# Patient Record
Sex: FEMALE | Race: OTHER | HISPANIC OR LATINO | URBAN - METROPOLITAN AREA
[De-identification: names, ages, dates, MRNs, and addresses within clinical notes are randomized per-mention and may not be internally consistent; named-entity substitution may affect disease eponyms.]

---

## 2018-04-17 ENCOUNTER — OUTPATIENT (OUTPATIENT)
Dept: OUTPATIENT SERVICES | Facility: HOSPITAL | Age: 83
LOS: 1 days | End: 2018-04-17
Payer: MEDICARE

## 2018-04-17 DIAGNOSIS — J44.9 CHRONIC OBSTRUCTIVE PULMONARY DISEASE, UNSPECIFIED: ICD-10-CM

## 2018-04-17 PROCEDURE — 71046 X-RAY EXAM CHEST 2 VIEWS: CPT

## 2018-04-17 PROCEDURE — 71046 X-RAY EXAM CHEST 2 VIEWS: CPT | Mod: 26

## 2018-04-23 ENCOUNTER — INPATIENT (INPATIENT)
Facility: HOSPITAL | Age: 83
LOS: 1 days | Discharge: HOME | DRG: 176 | End: 2018-04-25
Attending: HOSPITALIST | Admitting: HOSPITALIST
Payer: COMMERCIAL

## 2018-04-23 VITALS
TEMPERATURE: 99 F | RESPIRATION RATE: 18 BRPM | HEART RATE: 81 BPM | SYSTOLIC BLOOD PRESSURE: 122 MMHG | DIASTOLIC BLOOD PRESSURE: 82 MMHG | OXYGEN SATURATION: 97 % | WEIGHT: 134.92 LBS | HEIGHT: 62 IN

## 2018-04-23 DIAGNOSIS — Z90.49 ACQUIRED ABSENCE OF OTHER SPECIFIED PARTS OF DIGESTIVE TRACT: Chronic | ICD-10-CM

## 2018-04-23 DIAGNOSIS — I26.99 OTHER PULMONARY EMBOLISM WITHOUT ACUTE COR PULMONALE: ICD-10-CM

## 2018-04-23 LAB
ALBUMIN SERPL ELPH-MCNC: 3.9 G/DL — SIGNIFICANT CHANGE UP (ref 3.3–5)
ALP SERPL-CCNC: 93 U/L — SIGNIFICANT CHANGE UP (ref 40–120)
ALT FLD-CCNC: 15 U/L — SIGNIFICANT CHANGE UP (ref 10–45)
ANION GAP SERPL CALC-SCNC: 12 MMOL/L — SIGNIFICANT CHANGE UP (ref 5–17)
APTT BLD: 109.1 SEC — HIGH (ref 27.5–37.4)
AST SERPL-CCNC: 13 U/L — SIGNIFICANT CHANGE UP (ref 10–40)
BASOPHILS # BLD AUTO: 0 K/UL — SIGNIFICANT CHANGE UP (ref 0–0.2)
BASOPHILS NFR BLD AUTO: 0.5 % — SIGNIFICANT CHANGE UP (ref 0–2)
BILIRUB SERPL-MCNC: 0.3 MG/DL — SIGNIFICANT CHANGE UP (ref 0.2–1.2)
BLD GP AB SCN SERPL QL: NEGATIVE — SIGNIFICANT CHANGE UP
BUN SERPL-MCNC: 21 MG/DL — SIGNIFICANT CHANGE UP (ref 7–23)
CALCIUM SERPL-MCNC: 9.3 MG/DL — SIGNIFICANT CHANGE UP (ref 8.4–10.5)
CHLORIDE SERPL-SCNC: 104 MMOL/L — SIGNIFICANT CHANGE UP (ref 96–108)
CK MB CFR SERPL CALC: 3.8 NG/ML — SIGNIFICANT CHANGE UP (ref 0–3.8)
CK SERPL-CCNC: 94 U/L — SIGNIFICANT CHANGE UP (ref 25–170)
CO2 SERPL-SCNC: 26 MMOL/L — SIGNIFICANT CHANGE UP (ref 22–31)
CREAT SERPL-MCNC: 0.79 MG/DL — SIGNIFICANT CHANGE UP (ref 0.5–1.3)
EOSINOPHIL # BLD AUTO: 0.1 K/UL — SIGNIFICANT CHANGE UP (ref 0–0.5)
EOSINOPHIL NFR BLD AUTO: 1.8 % — SIGNIFICANT CHANGE UP (ref 0–6)
GAS PNL BLDV: SIGNIFICANT CHANGE UP
GLUCOSE SERPL-MCNC: 142 MG/DL — HIGH (ref 70–99)
HCT VFR BLD CALC: 34.4 % — LOW (ref 34.5–45)
HCT VFR BLD CALC: 38.9 % — SIGNIFICANT CHANGE UP (ref 34.5–45)
HGB BLD-MCNC: 11.5 G/DL — SIGNIFICANT CHANGE UP (ref 11.5–15.5)
HGB BLD-MCNC: 13.2 G/DL — SIGNIFICANT CHANGE UP (ref 11.5–15.5)
INR BLD: 1.06 RATIO — SIGNIFICANT CHANGE UP (ref 0.88–1.16)
LYMPHOCYTES # BLD AUTO: 1.5 K/UL — SIGNIFICANT CHANGE UP (ref 1–3.3)
LYMPHOCYTES # BLD AUTO: 20.5 % — SIGNIFICANT CHANGE UP (ref 13–44)
MCHC RBC-ENTMCNC: 29.8 PG — SIGNIFICANT CHANGE UP (ref 27–34)
MCHC RBC-ENTMCNC: 30.2 PG — SIGNIFICANT CHANGE UP (ref 27–34)
MCHC RBC-ENTMCNC: 33.5 GM/DL — SIGNIFICANT CHANGE UP (ref 32–36)
MCHC RBC-ENTMCNC: 33.9 GM/DL — SIGNIFICANT CHANGE UP (ref 32–36)
MCV RBC AUTO: 89 FL — SIGNIFICANT CHANGE UP (ref 80–100)
MCV RBC AUTO: 89.2 FL — SIGNIFICANT CHANGE UP (ref 80–100)
MONOCYTES # BLD AUTO: 0.6 K/UL — SIGNIFICANT CHANGE UP (ref 0–0.9)
MONOCYTES NFR BLD AUTO: 7.5 % — SIGNIFICANT CHANGE UP (ref 2–14)
NEUTROPHILS # BLD AUTO: 5.2 K/UL — SIGNIFICANT CHANGE UP (ref 1.8–7.4)
NEUTROPHILS NFR BLD AUTO: 69.5 % — SIGNIFICANT CHANGE UP (ref 43–77)
PLATELET # BLD AUTO: 176 K/UL — SIGNIFICANT CHANGE UP (ref 150–400)
PLATELET # BLD AUTO: 200 K/UL — SIGNIFICANT CHANGE UP (ref 150–400)
POTASSIUM SERPL-MCNC: 4.2 MMOL/L — SIGNIFICANT CHANGE UP (ref 3.5–5.3)
POTASSIUM SERPL-SCNC: 4.2 MMOL/L — SIGNIFICANT CHANGE UP (ref 3.5–5.3)
PROT SERPL-MCNC: 7.1 G/DL — SIGNIFICANT CHANGE UP (ref 6–8.3)
PROTHROM AB SERPL-ACNC: 11.6 SEC — SIGNIFICANT CHANGE UP (ref 9.8–12.7)
RBC # BLD: 3.87 M/UL — SIGNIFICANT CHANGE UP (ref 3.8–5.2)
RBC # BLD: 4.37 M/UL — SIGNIFICANT CHANGE UP (ref 3.8–5.2)
RBC # FLD: 12.1 % — SIGNIFICANT CHANGE UP (ref 10.3–14.5)
RBC # FLD: 12.3 % — SIGNIFICANT CHANGE UP (ref 10.3–14.5)
RH IG SCN BLD-IMP: POSITIVE — SIGNIFICANT CHANGE UP
SODIUM SERPL-SCNC: 142 MMOL/L — SIGNIFICANT CHANGE UP (ref 135–145)
TROPONIN T SERPL-MCNC: <0.01 NG/ML — SIGNIFICANT CHANGE UP (ref 0–0.06)
WBC # BLD: 7.2 K/UL — SIGNIFICANT CHANGE UP (ref 3.8–10.5)
WBC # BLD: 7.5 K/UL — SIGNIFICANT CHANGE UP (ref 3.8–10.5)
WBC # FLD AUTO: 7.2 K/UL — SIGNIFICANT CHANGE UP (ref 3.8–10.5)
WBC # FLD AUTO: 7.5 K/UL — SIGNIFICANT CHANGE UP (ref 3.8–10.5)

## 2018-04-23 PROCEDURE — 99223 1ST HOSP IP/OBS HIGH 75: CPT | Mod: AI

## 2018-04-23 PROCEDURE — 93010 ELECTROCARDIOGRAM REPORT: CPT

## 2018-04-23 PROCEDURE — 93308 TTE F-UP OR LMTD: CPT | Mod: 26

## 2018-04-23 PROCEDURE — 99285 EMERGENCY DEPT VISIT HI MDM: CPT | Mod: 25

## 2018-04-23 RX ORDER — HEPARIN SODIUM 5000 [USP'U]/ML
5000 INJECTION INTRAVENOUS; SUBCUTANEOUS ONCE
Qty: 0 | Refills: 0 | Status: COMPLETED | OUTPATIENT
Start: 2018-04-23 | End: 2018-04-23

## 2018-04-23 RX ORDER — HEPARIN SODIUM 5000 [USP'U]/ML
2500 INJECTION INTRAVENOUS; SUBCUTANEOUS EVERY 6 HOURS
Qty: 0 | Refills: 0 | Status: DISCONTINUED | OUTPATIENT
Start: 2018-04-23 | End: 2018-04-25

## 2018-04-23 RX ORDER — HEPARIN SODIUM 5000 [USP'U]/ML
5000 INJECTION INTRAVENOUS; SUBCUTANEOUS EVERY 6 HOURS
Qty: 0 | Refills: 0 | Status: DISCONTINUED | OUTPATIENT
Start: 2018-04-23 | End: 2018-04-25

## 2018-04-23 RX ORDER — SODIUM CHLORIDE 9 MG/ML
1000 INJECTION INTRAMUSCULAR; INTRAVENOUS; SUBCUTANEOUS ONCE
Qty: 0 | Refills: 0 | Status: COMPLETED | OUTPATIENT
Start: 2018-04-23 | End: 2018-04-23

## 2018-04-23 RX ORDER — HEPARIN SODIUM 5000 [USP'U]/ML
INJECTION INTRAVENOUS; SUBCUTANEOUS
Qty: 25000 | Refills: 0 | Status: DISCONTINUED | OUTPATIENT
Start: 2018-04-23 | End: 2018-04-24

## 2018-04-23 RX ADMIN — SODIUM CHLORIDE 1000 MILLILITER(S): 9 INJECTION INTRAMUSCULAR; INTRAVENOUS; SUBCUTANEOUS at 16:08

## 2018-04-23 RX ADMIN — HEPARIN SODIUM 1100 UNIT(S)/HR: 5000 INJECTION INTRAVENOUS; SUBCUTANEOUS at 17:33

## 2018-04-23 RX ADMIN — HEPARIN SODIUM 5000 UNIT(S): 5000 INJECTION INTRAVENOUS; SUBCUTANEOUS at 17:33

## 2018-04-23 NOTE — H&P ADULT - PROBLEM SELECTOR PLAN 1
bedside ED TTE w/o pericardial effusion or R heart strain, currently vitals are stable  - Heparin gtt started in ED, will titrate and monitor for signs of bleeding  - will check TTE and LE Doppler  - denied provoking factors, last mammo 2 yrs ago - reported neg, last PAP >20 yrs ago and reported negative, colonoscopy - "didn't remember"  - Will need Heme eval for hypercoag w/u prior d/c AC

## 2018-04-23 NOTE — ED ADULT TRIAGE NOTE - CHIEF COMPLAINT QUOTE
back pain for 6 months, s/p MRI Saturday. As per daughter she was told pt has "blood clot on her chest"

## 2018-04-23 NOTE — H&P ADULT - FAMILY HISTORY
No pertinent family history in first degree relatives Mother  Still living? No  Family history of diabetes mellitus, Age at diagnosis: Age Unknown

## 2018-04-23 NOTE — H&P ADULT - PROBLEM SELECTOR PLAN 4
confirmed wit patient and daughter Greta at bedside and "meds from other sources" - will treat with Crestor

## 2018-04-23 NOTE — ED ADULT NURSE REASSESSMENT NOTE - NS ED NURSE REASSESS COMMENT FT1
Received patient from previous shift RN, unable to introduce self to patient at this time, patient in ultrasound, will continue to monitor upon return.

## 2018-04-23 NOTE — H&P ADULT - NSHPLABSRESULTS_GEN_ALL_CORE
Labs personally reviewed  CTA report personally reviewed  EKG tracing personally reviewed Labs personally reviewed  CTA report personally reviewed  EKG tracing personally reviewed - NSR @ 62

## 2018-04-23 NOTE — H&P ADULT - HISTORY OF PRESENT ILLNESS
82 years old female with history of GERD and HTN who was sent from her Doctor's office for urgent evaluation after an MRI of the spine done on [place] showed "clots in the chest." 83 y/o F Hx HTN, HLD, recent Flu in Feb 2018 with reactive airway dz presented to PMD with 1-2 months of R sided upper back pain after moving heavy furniture, sent by PMD for CT spine (4/20/18), showed "clots in the chest", sent to ED for further evaluation and treatment.  Patient has no complaint currently.  Denied CP, SOB/DARDEN, back pain, syncope, LE swelling or pain, recent travel or surgery, immobility or prior/FHx of blood clot.

## 2018-04-23 NOTE — H&P ADULT - ASSESSMENT
82 F hx HTN, GERD 82 F hx HTN, HLD, recent R back pain evaluated by CT spine found to have b/l PE admitted with acute b/l PE.

## 2018-04-23 NOTE — CONSULT NOTE ADULT - SUBJECTIVE AND OBJECTIVE BOX
General Surgery Consult  Consulting surgical team: [facility] [id number]  Consulting attending: Dr. Mcgraw    HPI: 82F with PMHx GERD, HTN sent in by her PCP Dr. Valera after outpatient study showed PE. CT scan shows pneumobilia. Patient denies abdominal pain, nausea/emesis. Patient is a poor historian. She denies any ERCP/sphincterotomy/stent previously or previous lap luis. However, she also denies any surgical history but has two abdominal incisions. Daughter at bedside also is not familiar with patients full medical/surgical history.     PAST MEDICAL HISTORY:  GERD (gastroesophageal reflux disease)  HTN (hypertension)    PAST SURGICAL HISTORY:  open appendectomy  lower midline laparotomy (patient does not know which surgery she had done)    MEDICATIONS: rosuvastatin, HCTZ, olmesartan, montelukast     ALLERGIES:  No Known Drug Allergies  Seafood (Hives)    VITALS & I/Os:  Vital Signs Last 24 Hrs  T(C): 37.1 (23 Apr 2018 13:46), Max: 37.1 (23 Apr 2018 13:46)  T(F): 98.7 (23 Apr 2018 13:46), Max: 98.7 (23 Apr 2018 13:46)  HR: 68 (23 Apr 2018 17:40) (68 - 81)  BP: 146/58 (23 Apr 2018 17:40) (122/82 - 147/74)  BP(mean): --  RR: 18 (23 Apr 2018 17:40) (16 - 18)  SpO2: 97% (23 Apr 2018 17:40) (95% - 97%)    PHYSICAL EXAM:  General: No acute distress  Respiratory: Nonlabored  Cardiovascular: Regular rate and rhythm  Abdominal: Soft, nondistended, nontender. No rebound or guarding. No organomegaly, no palpable mass.  Extremities: Warm    LABS:                        13.2   7.5   )-----------( 200      ( 23 Apr 2018 14:54 )             38.9     04-23    142  |  104  |  21  ----------------------------<  142<H>  4.2   |  26  |  0.79    Ca    9.3      23 Apr 2018 14:54    TPro  7.1  /  Alb  3.9  /  TBili  0.3  /  DBili  x   /  AST  13  /  ALT  15  /  AlkPhos  93  04-23    Lactate:  04-23 @ 14:54  1.5    PT/INR - ( 23 Apr 2018 14:54 )   PT: 11.6 sec;   INR: 1.06 ratio         PTT - ( 23 Apr 2018 14:54 )  PTT:27.2 sec    CARDIAC MARKERS ( 23 Apr 2018 14:54 )  x     / <0.01 ng/mL / 94 U/L / x     / 3.8 ng/mL    IMAGING:  US Abdomen Upper Quadrant Right (04.23.18 @ 19:27)   IMPRESSION:   Fundal gallbladder wall calcification and adenomyomatosis of the   gallbladder. No gallstones or secondary signs of acute cholecystitis.    Pneumobilia in the bilateral hepatic lobes with dilated common bile duct   measuring 9 mm. Correlation with history of prior ERCP or sphincterotomy   is recommended.      CT Angio Chest w/ IV Cont (04.23.18 @ 17:09)   IMPRESSION:   Bilateral lobar and segmental pulmonary emboli as above.  Pneumobilia in the visualized abdomen.  Bilateral thyroid nodules for which a nonemergent thyroid ultrasound may   be obtained for further evaluation. General Surgery Consult  Consulting surgical team: [facility] [id number]  Consulting attending: Dr. Mcgraw    HPI: 82F with PMHx GERD, HTN sent in by her PCP Dr. Valera after outpatient study showed PE. CT scan shows pneumobilia. Patient denies abdominal pain, nausea/emesis, recent weight loss, changes or problems with bowel movements. Patient is a poor historian. She denies any ERCP/sphincterotomy/stent previously or previous lap luis. However, she also denies any surgical history but has two abdominal incisions. Daughter at bedside also is not familiar with patients full medical/surgical history. Pt presented with right, upper back pain which began after lifting furniture 1-2 months ago, no shortness of breath. She was sent by per PMD to have an outpatient CT, which demonstrated bilateral segmental PEs and incidental pneumobilia.    PAST MEDICAL HISTORY:  GERD (gastroesophageal reflux disease)  HTN (hypertension)  Asthma    PAST SURGICAL HISTORY:  open appendectomy  lower midline laparotomy (patient does not know which surgery she had done)    MEDICATIONS: rosuvastatin, HCTZ, olmesartan, montelukast     ALLERGIES:  No Known Drug Allergies  Seafood (Hives)    VITALS & I/Os:  Vital Signs Last 24 Hrs  T(C): 37.1 (23 Apr 2018 13:46), Max: 37.1 (23 Apr 2018 13:46)  T(F): 98.7 (23 Apr 2018 13:46), Max: 98.7 (23 Apr 2018 13:46)  HR: 68 (23 Apr 2018 17:40) (68 - 81)  BP: 146/58 (23 Apr 2018 17:40) (122/82 - 147/74)  BP(mean): --  RR: 18 (23 Apr 2018 17:40) (16 - 18)  SpO2: 97% (23 Apr 2018 17:40) (95% - 97%)    PHYSICAL EXAM:  General: No acute distress  Eyes: no scleral icterus  Neuro: A&Ox3, moves all extremities  Respiratory: Nonlabored, CTA B/L  Cardiovascular: Regular rate and rhythm  Abdominal: Soft, nondistended, nontender. No rebound or guarding. No organomegaly, no palpable mass.  Extremities: Warm  Psych: normal affect  Skin: no rashes    LABS:                        13.2   7.5   )-----------( 200      ( 23 Apr 2018 14:54 )             38.9     04-23    142  |  104  |  21  ----------------------------<  142<H>  4.2   |  26  |  0.79    Ca    9.3      23 Apr 2018 14:54    TPro  7.1  /  Alb  3.9  /  TBili  0.3  /  DBili  x   /  AST  13  /  ALT  15  /  AlkPhos  93  04-23    Lactate:  04-23 @ 14:54  1.5    PT/INR - ( 23 Apr 2018 14:54 )   PT: 11.6 sec;   INR: 1.06 ratio         PTT - ( 23 Apr 2018 14:54 )  PTT:27.2 sec    CARDIAC MARKERS ( 23 Apr 2018 14:54 )  x     / <0.01 ng/mL / 94 U/L / x     / 3.8 ng/mL    IMAGING:  US Abdomen Upper Quadrant Right (04.23.18 @ 19:27)   IMPRESSION:   Fundal gallbladder wall calcification and adenomyomatosis of the   gallbladder. No gallstones or secondary signs of acute cholecystitis.    Pneumobilia in the bilateral hepatic lobes with dilated common bile duct   measuring 9 mm. Correlation with history of prior ERCP or sphincterotomy   is recommended.      CT Angio Chest w/ IV Cont (04.23.18 @ 17:09)   IMPRESSION:   Bilateral lobar and segmental pulmonary emboli as above.  Pneumobilia in the visualized abdomen.  Bilateral thyroid nodules for which a nonemergent thyroid ultrasound may   be obtained for further evaluation.

## 2018-04-23 NOTE — H&P ADULT - PROBLEM SELECTOR PLAN 3
- will treat with Crestor non compliant with meds, last time she took them was 2 months ago  - will restart ARB with holding parameters, will hold diuretics   - will monitor BP

## 2018-04-23 NOTE — ED PROVIDER NOTE - SHIFT CHANGE DETAILS
Attending MD Barbosa: Upper back, no SOB, no CP.  PE on non con CT.  No blood in stools.  No head trauma.  Pending CTA Chest, started on anticoag, RV looks ok on US, LLE no clots obvious.  Will need admission.  No weight loss, no malignancy risk factors.

## 2018-04-23 NOTE — CONSULT NOTE ADULT - ASSESSMENT
82F with asymptomatic pneumobilia found incidentally on CT  -no acute surgical intervention at this time  -plan per primary for treatment of pulmonary embolism  -Patient should follow up with Dr. Omar Garcia as an outpatient re: fundal gallbladder wall calcifications  -d/w attending    [id number]

## 2018-04-23 NOTE — H&P ADULT - PROBLEM SELECTOR PLAN 5
on full AC with Heparin gtt confirmed wit patient and daughter Greta at bedside and "meds from other sources"

## 2018-04-23 NOTE — ED PROVIDER NOTE - MEDICAL DECISION MAKING DETAILS
Stable for admission Attending Rodriguez: 81 y/o female sent in after ct noncontrast concerning for possible pulmonary embolism. per family pt has been complaining of upper back pain. pt denies any sob. no headaches or recent head trauma. no black or bloody stools. pt hemodynamically stable. no weight loss or history to suggest malignancy. will obtain CTA to evaluate for PE, duplex of LE, and admit. pt denies any bleeding history or headaches therefore will start anticoagulation. plan to admit

## 2018-04-23 NOTE — ED ADULT NURSE NOTE - OBJECTIVE STATEMENT
82 year old female alert and oriented x 4 came to the ED accompanied by daughter s/p MRI.  Patient had CT for right sided mid back pain for 6 months and was called by her doctor for clots seen in the lungs.  Patient denies pain at time of arrival and also denies; chest pain, shortness of breath, pain with inspiration, dyspnea on exertion, recent travel, fevers, swelling in the extremities.  Patient has unlabored breathing and chest rise is equal and symmetrical b/l.  IV established in right AC #18 and patient placed on CM in SR.

## 2018-04-23 NOTE — H&P ADULT - PROBLEM SELECTOR PLAN 2
non compliant with meds, last time she took them was 2 months ago  - will restart ARB with holding parameters, will hold diuretics   - will monitor BP denied recent procedure  - Surg eval appreciated, outpatient follow for gallbladder wall calcification

## 2018-04-23 NOTE — ED PROVIDER NOTE - OBJECTIVE STATEMENT
82 years old female with history of GERD and HTN who was sent from her Doctor's office for urgent evaluation after an MRI of the spine done on [place] showed "clots in the chest." Patient is accompanied by her daughter who translated during this encounter. She does not have the MRI report with her and PCP who sent her in is away at the time of this evaluation. Patient has had stable right sided back pain for 6 months and had an MRI of spine in 02/2018 which showed bulging discs. No motor or sensory deficits. No fever, chills, N/V/D, vision changes, chest pain, dyspnea, hemoptysis, recent travel, sick contacts, new medications, hx of afib, DVT/PE or malignancy.

## 2018-04-23 NOTE — ED PROVIDER NOTE - PROGRESS NOTE DETAILS
CT with IV contrast shows B/L PE as per fax received from PCP. Stat labs and CT angio ordered Attending MD Barbosa: Spoke with radiology, CTA Chest has bilateral lobar and segmental emboli but also pneumobilia.  Surgery paged. U/S RUQ ordered. Attending MD Barbosa: Called surgery, still awaiting consult, report have not seen patient yet, will see patient asap, explained disposition pending their evaluation. Attending MD Barbosa: Consult surgery note seen in chart, no acute surgical intervention.  Will admit to medicine.  Paged hospitalist, will await call back.

## 2018-04-23 NOTE — ED PROVIDER NOTE - CARE PLAN
Principal Discharge DX:	Pulmonary embolus  Assessment and plan of treatment:	Admit for heparin drip and bridge to other oral AC.

## 2018-04-23 NOTE — H&P ADULT - NSHPSOCIALHISTORY_GEN_ALL_CORE
lives with , lives with daughter and grand daughter in a 5th floor apartment, ambulate w/o assist device, denied alcohol or tobacco, retired from factory work

## 2018-04-24 DIAGNOSIS — Z29.9 ENCOUNTER FOR PROPHYLACTIC MEASURES, UNSPECIFIED: ICD-10-CM

## 2018-04-24 DIAGNOSIS — E04.1 NONTOXIC SINGLE THYROID NODULE: ICD-10-CM

## 2018-04-24 DIAGNOSIS — E78.4 OTHER HYPERLIPIDEMIA: ICD-10-CM

## 2018-04-24 DIAGNOSIS — I10 ESSENTIAL (PRIMARY) HYPERTENSION: ICD-10-CM

## 2018-04-24 DIAGNOSIS — Z79.899 OTHER LONG TERM (CURRENT) DRUG THERAPY: ICD-10-CM

## 2018-04-24 DIAGNOSIS — K82.8 OTHER SPECIFIED DISEASES OF GALLBLADDER: ICD-10-CM

## 2018-04-24 DIAGNOSIS — I26.99 OTHER PULMONARY EMBOLISM WITHOUT ACUTE COR PULMONALE: ICD-10-CM

## 2018-04-24 LAB
ANION GAP SERPL CALC-SCNC: 12 MMOL/L — SIGNIFICANT CHANGE UP (ref 5–17)
APTT BLD: 69.1 SEC — HIGH (ref 27.5–37.4)
APTT BLD: 78.8 SEC — HIGH (ref 27.5–37.4)
APTT BLD: 89.2 SEC — HIGH (ref 27.5–37.4)
BASOPHILS # BLD AUTO: 0.03 K/UL — SIGNIFICANT CHANGE UP (ref 0–0.2)
BASOPHILS NFR BLD AUTO: 0.5 % — SIGNIFICANT CHANGE UP (ref 0–2)
BUN SERPL-MCNC: 16 MG/DL — SIGNIFICANT CHANGE UP (ref 7–23)
CALCIUM SERPL-MCNC: 8.5 MG/DL — SIGNIFICANT CHANGE UP (ref 8.4–10.5)
CHLORIDE SERPL-SCNC: 107 MMOL/L — SIGNIFICANT CHANGE UP (ref 96–108)
CO2 SERPL-SCNC: 23 MMOL/L — SIGNIFICANT CHANGE UP (ref 22–31)
CREAT SERPL-MCNC: 0.67 MG/DL — SIGNIFICANT CHANGE UP (ref 0.5–1.3)
EOSINOPHIL # BLD AUTO: 0.24 K/UL — SIGNIFICANT CHANGE UP (ref 0–0.5)
EOSINOPHIL NFR BLD AUTO: 3.9 % — SIGNIFICANT CHANGE UP (ref 0–6)
GLUCOSE SERPL-MCNC: 111 MG/DL — HIGH (ref 70–99)
HCT VFR BLD CALC: 34.9 % — SIGNIFICANT CHANGE UP (ref 34.5–45)
HCT VFR BLD CALC: 39.5 % — SIGNIFICANT CHANGE UP (ref 34.5–45)
HGB BLD-MCNC: 11.6 G/DL — SIGNIFICANT CHANGE UP (ref 11.5–15.5)
HGB BLD-MCNC: 13.4 G/DL — SIGNIFICANT CHANGE UP (ref 11.5–15.5)
IMM GRANULOCYTES NFR BLD AUTO: 0.5 % — SIGNIFICANT CHANGE UP (ref 0–1.5)
LYMPHOCYTES # BLD AUTO: 1.45 K/UL — SIGNIFICANT CHANGE UP (ref 1–3.3)
LYMPHOCYTES # BLD AUTO: 23.8 % — SIGNIFICANT CHANGE UP (ref 13–44)
MCHC RBC-ENTMCNC: 29.1 PG — SIGNIFICANT CHANGE UP (ref 27–34)
MCHC RBC-ENTMCNC: 30.1 PG — SIGNIFICANT CHANGE UP (ref 27–34)
MCHC RBC-ENTMCNC: 33.2 GM/DL — SIGNIFICANT CHANGE UP (ref 32–36)
MCHC RBC-ENTMCNC: 33.9 GM/DL — SIGNIFICANT CHANGE UP (ref 32–36)
MCV RBC AUTO: 87.7 FL — SIGNIFICANT CHANGE UP (ref 80–100)
MCV RBC AUTO: 88.7 FL — SIGNIFICANT CHANGE UP (ref 80–100)
MONOCYTES # BLD AUTO: 0.44 K/UL — SIGNIFICANT CHANGE UP (ref 0–0.9)
MONOCYTES NFR BLD AUTO: 7.2 % — SIGNIFICANT CHANGE UP (ref 2–14)
NEUTROPHILS # BLD AUTO: 3.9 K/UL — SIGNIFICANT CHANGE UP (ref 1.8–7.4)
NEUTROPHILS NFR BLD AUTO: 64.1 % — SIGNIFICANT CHANGE UP (ref 43–77)
PLATELET # BLD AUTO: 189 K/UL — SIGNIFICANT CHANGE UP (ref 150–400)
PLATELET # BLD AUTO: 197 K/UL — SIGNIFICANT CHANGE UP (ref 150–400)
POTASSIUM SERPL-MCNC: 3.6 MMOL/L — SIGNIFICANT CHANGE UP (ref 3.5–5.3)
POTASSIUM SERPL-SCNC: 3.6 MMOL/L — SIGNIFICANT CHANGE UP (ref 3.5–5.3)
RBC # BLD: 3.98 M/UL — SIGNIFICANT CHANGE UP (ref 3.8–5.2)
RBC # BLD: 4.46 M/UL — SIGNIFICANT CHANGE UP (ref 3.8–5.2)
RBC # FLD: 12.2 % — SIGNIFICANT CHANGE UP (ref 10.3–14.5)
RBC # FLD: 13.8 % — SIGNIFICANT CHANGE UP (ref 10.3–14.5)
SODIUM SERPL-SCNC: 142 MMOL/L — SIGNIFICANT CHANGE UP (ref 135–145)
WBC # BLD: 6.09 K/UL — SIGNIFICANT CHANGE UP (ref 3.8–10.5)
WBC # BLD: 6.3 K/UL — SIGNIFICANT CHANGE UP (ref 3.8–10.5)
WBC # FLD AUTO: 6.09 K/UL — SIGNIFICANT CHANGE UP (ref 3.8–10.5)
WBC # FLD AUTO: 6.3 K/UL — SIGNIFICANT CHANGE UP (ref 3.8–10.5)

## 2018-04-24 PROCEDURE — 99233 SBSQ HOSP IP/OBS HIGH 50: CPT | Mod: GC

## 2018-04-24 PROCEDURE — 93970 EXTREMITY STUDY: CPT | Mod: 26

## 2018-04-24 RX ORDER — HEPARIN SODIUM 5000 [USP'U]/ML
1000 INJECTION INTRAVENOUS; SUBCUTANEOUS
Qty: 25000 | Refills: 0 | Status: DISCONTINUED | OUTPATIENT
Start: 2018-04-24 | End: 2018-04-25

## 2018-04-24 RX ORDER — ROSUVASTATIN CALCIUM 5 MG/1
10 TABLET ORAL AT BEDTIME
Qty: 0 | Refills: 0 | Status: DISCONTINUED | OUTPATIENT
Start: 2018-04-24 | End: 2018-04-25

## 2018-04-24 RX ORDER — LOSARTAN POTASSIUM 100 MG/1
100 TABLET, FILM COATED ORAL DAILY
Qty: 0 | Refills: 0 | Status: DISCONTINUED | OUTPATIENT
Start: 2018-04-24 | End: 2018-04-25

## 2018-04-24 RX ADMIN — HEPARIN SODIUM 1000 UNIT(S)/HR: 5000 INJECTION INTRAVENOUS; SUBCUTANEOUS at 00:04

## 2018-04-24 RX ADMIN — HEPARIN SODIUM 1000 UNIT(S)/HR: 5000 INJECTION INTRAVENOUS; SUBCUTANEOUS at 13:25

## 2018-04-24 RX ADMIN — HEPARIN SODIUM 1000 UNIT(S)/HR: 5000 INJECTION INTRAVENOUS; SUBCUTANEOUS at 20:50

## 2018-04-24 RX ADMIN — HEPARIN SODIUM 1000 UNIT(S)/HR: 5000 INJECTION INTRAVENOUS; SUBCUTANEOUS at 06:19

## 2018-04-24 RX ADMIN — LOSARTAN POTASSIUM 100 MILLIGRAM(S): 100 TABLET, FILM COATED ORAL at 05:13

## 2018-04-24 RX ADMIN — ROSUVASTATIN CALCIUM 10 MILLIGRAM(S): 5 TABLET ORAL at 21:13

## 2018-04-24 NOTE — PROGRESS NOTE ADULT - PROBLEM SELECTOR PLAN 2
Pt and family deny history of surgeries, ERCP though has apparent incisions on abdomen. Surgery evaluated pt in ED with no plan for surgical intervention. In addition to calcification, incidental pneumobilia and dilated CBD seen on RUQ US and CT angio/chest.   -surgery rec's appreciated.  -outpatient follow-up for calcification of fundal wall gallbladder per surgery.  -no surgical intervention for incidental pneumobilia.   -in absence of fevers, abd pain, elevated bilirubin, no acute intervention for incidental CBD dilatation. Pt and family deny history of surgeries or ERCP; I called PMD who confirmed remote history of appendectomy. Surgery evaluated pt in ED with no plan for surgical intervention. In addition to calcification, incidental pneumobilia and dilated CBD seen on RUQ US and CT angio/chest.   -surgery rec's appreciated.  -outpatient follow-up for calcification of fundal wall gallbladder per surgery.  -no surgical intervention for incidental pneumobilia.   -in absence of fevers, abd pain, elevated bilirubin, no acute intervention for incidental CBD dilatation.

## 2018-04-24 NOTE — PROGRESS NOTE ADULT - PROBLEM SELECTOR PLAN 4
Pt was previously on statin and ARB but non-adherent with these medications at home. BP well-controlled on currently. ARB was resumed.  - c/w losartan 100mg daily   - vitals per routine. Pt was previously on statin and ARB but non-adherent with these medications at home. BP well-controlled currently. ARB was resumed.  - c/w losartan 100mg daily   - vitals per routine.

## 2018-04-24 NOTE — PROGRESS NOTE ADULT - ATTENDING COMMENTS
Patient seen with Dr. Daniels with daughter in attendance.  Offered  phone, they declined and daughter served as .  No longer having back pain.  No SOB.  NAD.  Bilateral lobar and segmental pulmonary emboli bilaterally.  Per daughter, no recent injury, surgery, immobilization, or long distance travel.  Did travel to [facility] in October, but this was about 4 months prior to onset of symptoms per daughter.  Dopplers negative.  Continue Heparin gtt.  Unprovoked DVT.  Will ask hematology to help with hypercoag work-up and management of anticoagulation.  Bilateral thyroid nodules on CT.  Checking TSH and free T4.  If wnl, could get thyroid ultrasound as outpatient.  HTN, continue Losartan.  HLD, continue statin.

## 2018-04-24 NOTE — PROGRESS NOTE ADULT - SUBJECTIVE AND OBJECTIVE BOX
w Pancytopenia  - worsening thrombocytopenia    plt tx     Patient is a 82y old  Female who presents with a chief complaint of right upper back pain.    SUBJECTIVE / OVERNIGHT EVENTS:   Vietnamese-speaking. Offered  service but pt declined, offering to proceed with her daughter as . Pt reports that her right upper back pain has completely resolved at this time. She feels well and wants to go home. In the ED she was seen by surgery for pneumobilia seen on CT scan with no plan for surgical intervention. Patient and daughter deny history of surgeries or knowledge of full surgical history. At this time patient has no complaints, denying CP, SOB, n/v/d/c, LE pain, back pain, new joint pain, open wounds, change in vision/hearing, headaches, sore throat, runny nose. She does report cramping some times while lying flat. She has had reduced appetite and has last 5 lbs in the past 3 months. No documentation in Allscripts.     MEDICATIONS  (STANDING):  heparin  Infusion. 1000 Unit(s)/Hr (10 mL/Hr) IV Continuous <Continuous>  losartan 100 milliGRAM(s) Oral daily  rosuvastatin 10 milliGRAM(s) Oral at bedtime    MEDICATIONS  (PRN):  heparin  Injectable 2500 Unit(s) IV Push every 6 hours PRN For aPTT between 40 - 57  heparin  Injectable 5000 Unit(s) IV Push every 6 hours PRN For aPTT less than 40    CAPILLARY BLOOD GLUCOSE:  n/a    I&O's Summary: n/a    PHYSICAL EXAM:  GENERAL: NAD, well-developed, sitting up on side of bed in NAD  HEAD:  Atraumatic, Normocephalic  EYES: EOMI, PERRLA, conjunctiva and sclera clear  NECK: Supple, No JVD  CHEST/LUNG: Clear to auscultation bilaterally; No wheeze  HEART: Regular rate and rhythm; No murmurs, rubs, or gallops  ABDOMEN: Soft, Nontender, Nondistended; Bowel sounds present  EXTREMITIES:  2+ Peripheral Pulses, No clubbing, cyanosis, or edema  PSYCH: AAOx3  NEUROLOGY: non-focal  SKIN: No rashes or lesions    LABS:                        11.6   6.09  )-----------( 189      ( 24 Apr 2018 07:47 )             34.9     04-24    142  |  107  |  16  ----------------------------<  111<H>  3.6   |  23  |  0.67    Ca    8.5      24 Apr 2018 05:20    TPro  7.1  /  Alb  3.9  /  TBili  0.3  /  DBili  x   /  AST  13  /  ALT  15  /  AlkPhos  93  04-23    PT/INR - ( 23 Apr 2018 14:54 )   PT: 11.6 sec;   INR: 1.06 ratio         PTT - ( 24 Apr 2018 05:46 )  PTT:89.2 sec  CARDIAC MARKERS ( 23 Apr 2018 14:54 )  x     / <0.01 ng/mL / 94 U/L / x     / 3.8 ng/mL    RADIOLOGY & ADDITIONAL TESTS:    Imaging Personally Reviewed: yes  < from: US Abdomen Upper Quadrant Right (04.23.18 @ 19:27) >  IMPRESSION:   Fundal gallbladder wall calcification and adenomyomatosis of the   gallbladder. No gallstones or secondary signs of acute cholecystitis.    Pneumobilia in the bilateral hepatic lobes with dilated common bile duct   measuring 9 mm. Correlation with history of prior ERCP or sphincterotomy   is recommended.  < end of copied text >    < from: CT Angio Chest w/ IV Cont (04.23.18 @ 17:09) >  IMPRESSION:   Bilateral lobar and segmental pulmonary emboli as above.  Pneumobilia in the visualized abdomen.  Bilateral thyroid nodules for which a nonemergent thyroid ultrasound may   be obtained for further evaluation.    Dr. Platt discussed these findings with Dr. Barbosa on 4/23/2018 5:30   PM with read back.  < end of copied text >    < from: US TTE 2D F/U, Limited w/o Contrast (ED) (04.23.18 @ 15:35) >  IMPRESSION:   No Pericardial Effusion.  Limited evaluation of the right ventricle without evidence of strain  < end of copied text >    Consultant(s) Notes Reviewed:  n/a    Care Discussed with Consultants/Other Providers: n/a Patient is a 82y old  Female who presents with a chief complaint of right upper back pain.    SUBJECTIVE / OVERNIGHT EVENTS:   Grenadian-speaking. Offered  service but pt declined, offering to proceed with her daughter as . Pt reports that her right upper back pain has completely resolved at this time. She feels well and wants to go home. In the ED she was seen by surgery for pneumobilia seen on CT scan with no plan for surgical intervention. Patient and daughter deny history of surgeries or knowledge of full surgical history. At this time patient has no complaints, denying CP, SOB, n/v/d/c, LE pain, back pain, new joint pain, open wounds, change in vision/hearing, headaches, sore throat, runny nose. She does report cramping some times while lying flat. She has had reduced appetite and has last 5 lbs in the past 3 months. No documentation in Allscripts.     MEDICATIONS  (STANDING):  heparin  Infusion. 1000 Unit(s)/Hr (10 mL/Hr) IV Continuous <Continuous>  losartan 100 milliGRAM(s) Oral daily  rosuvastatin 10 milliGRAM(s) Oral at bedtime    MEDICATIONS  (PRN):  heparin  Injectable 2500 Unit(s) IV Push every 6 hours PRN For aPTT between 40 - 57  heparin  Injectable 5000 Unit(s) IV Push every 6 hours PRN For aPTT less than 40    CAPILLARY BLOOD GLUCOSE:  n/a    I&O's Summary: n/a    PHYSICAL EXAM:  GENERAL: NAD, well-developed, sitting up on side of bed in NAD, breathing comfortably  HEAD:  Atraumatic, Normocephalic  EYES: EOMI, PERRLA, conjunctiva and sclera clear, arcus senilis b/l  NECK: Supple, No JVD  CHEST/LUNG: Clear to auscultation bilaterally; No wheeze  HEART: Regular rate and rhythm; No murmurs, rubs, or gallops; +S1,2  ABDOMEN: Soft, Nontender, Nondistended; Bowel sounds present, no abd tenderness to deep palpation throughout.   EXTREMITIES:  2+ Peripheral Pulses, No clubbing, cyanosis, or edema, NO BACK PAIN OR TTP  : no bilateral flank pain, no suprapubic tenderness  PSYCH: AAOx3  NEUROLOGY: non-focal  SKIN: No rashes or lesions    LABS:                        11.6   6.09  )-----------( 189      ( 24 Apr 2018 07:47 )             34.9     04-24    142  |  107  |  16  ----------------------------<  111<H>  3.6   |  23  |  0.67    Ca    8.5      24 Apr 2018 05:20    TPro  7.1  /  Alb  3.9  /  TBili  0.3  /  DBili  x   /  AST  13  /  ALT  15  /  AlkPhos  93  04-23    PT/INR - ( 23 Apr 2018 14:54 )   PT: 11.6 sec;   INR: 1.06 ratio         PTT - ( 24 Apr 2018 05:46 )  PTT:89.2 sec  CARDIAC MARKERS ( 23 Apr 2018 14:54 )  x     / <0.01 ng/mL / 94 U/L / x     / 3.8 ng/mL    RADIOLOGY & ADDITIONAL TESTS:    Imaging Personally Reviewed: yes  < from: US Abdomen Upper Quadrant Right (04.23.18 @ 19:27) >  IMPRESSION:   Fundal gallbladder wall calcification and adenomyomatosis of the   gallbladder. No gallstones or secondary signs of acute cholecystitis.    Pneumobilia in the bilateral hepatic lobes with dilated common bile duct   measuring 9 mm. Correlation with history of prior ERCP or sphincterotomy   is recommended.  < end of copied text >    < from: CT Angio Chest w/ IV Cont (04.23.18 @ 17:09) >  IMPRESSION:   Bilateral lobar and segmental pulmonary emboli as above.  Pneumobilia in the visualized abdomen.  Bilateral thyroid nodules for which a nonemergent thyroid ultrasound may   be obtained for further evaluation.    Dr. Platt discussed these findings with Dr. Barbosa on 4/23/2018 5:30   PM with read back.  < end of copied text >    < from: US TTE 2D F/U, Limited w/o Contrast (ED) (04.23.18 @ 15:35) >  IMPRESSION:   No Pericardial Effusion.  Limited evaluation of the right ventricle without evidence of strain  < end of copied text >    Consultant(s) Notes Reviewed:  n/a    Care Discussed with Consultants/Other Providers: n/a Patient is a 82y old  Female who presents with a chief complaint of right upper back pain.    SUBJECTIVE / OVERNIGHT EVENTS:   Samoan-speaking. Offered  service but pt declined, offering to proceed with her daughter as . Pt reports that her right upper back pain has completely resolved at this time. She feels well and wants to go home. In the ED she was seen by surgery for pneumobilia seen on CT scan with no plan for surgical intervention. Patient and daughter deny history of surgeries or knowledge of full surgical history. At this time patient has no complaints, denying CP, SOB, n/v/d/c, LE pain, back pain, new joint pain, open wounds, change in vision/hearing, headaches, sore throat, runny nose. She does report cramping some times while lying flat. She has had reduced appetite and has last 5 lbs in the past 3 months. No documentation in Allscripts.     I called and spoke with PMD's office, an NP working with Dr. Valera. The patient has a remote history of appendectomy. Her last weights were 140lbs (4/6/18) and 135 lb on visit prior. She has no hx of DVT/PE. No recent falls or immobility. She has hx of HTN, HLD, prediabetes, varicose veins. She has been complaining of back pain for months, prompting MRI spine and CT chest which ultimately led to her being sent to ED for suspicion for PE.     PMD's office: [phone]    MEDICATIONS  (STANDING):  heparin  Infusion. 1000 Unit(s)/Hr (10 mL/Hr) IV Continuous <Continuous>  losartan 100 milliGRAM(s) Oral daily  rosuvastatin 10 milliGRAM(s) Oral at bedtime    MEDICATIONS  (PRN):  heparin  Injectable 2500 Unit(s) IV Push every 6 hours PRN For aPTT between 40 - 57  heparin  Injectable 5000 Unit(s) IV Push every 6 hours PRN For aPTT less than 40    CAPILLARY BLOOD GLUCOSE:  n/a    I&O's Summary: n/a    PHYSICAL EXAM:  GENERAL: NAD, well-developed, sitting up on side of bed in NAD, breathing comfortably  HEAD:  Atraumatic, Normocephalic  EYES: EOMI, PERRLA, conjunctiva and sclera clear, arcus senilis b/l  NECK: Supple, No JVD  CHEST/LUNG: Clear to auscultation bilaterally; No wheeze  HEART: Regular rate and rhythm; No murmurs, rubs, or gallops; +S1,2  ABDOMEN: Soft, Nontender, Nondistended; Bowel sounds present, no abd tenderness to deep palpation throughout.   EXTREMITIES:  2+ Peripheral Pulses, No clubbing, cyanosis, or edema, NO BACK PAIN OR TTP  : no bilateral flank pain, no suprapubic tenderness  PSYCH: AAOx3  NEUROLOGY: non-focal  SKIN: No rashes or lesions    LABS:                        11.6   6.09  )-----------( 189      ( 24 Apr 2018 07:47 )             34.9     04-24    142  |  107  |  16  ----------------------------<  111<H>  3.6   |  23  |  0.67    Ca    8.5      24 Apr 2018 05:20    TPro  7.1  /  Alb  3.9  /  TBili  0.3  /  DBili  x   /  AST  13  /  ALT  15  /  AlkPhos  93  04-23    PT/INR - ( 23 Apr 2018 14:54 )   PT: 11.6 sec;   INR: 1.06 ratio         PTT - ( 24 Apr 2018 05:46 )  PTT:89.2 sec  CARDIAC MARKERS ( 23 Apr 2018 14:54 )  x     / <0.01 ng/mL / 94 U/L / x     / 3.8 ng/mL    RADIOLOGY & ADDITIONAL TESTS:    Imaging Personally Reviewed: yes  < from: US Abdomen Upper Quadrant Right (04.23.18 @ 19:27) >  IMPRESSION:   Fundal gallbladder wall calcification and adenomyomatosis of the   gallbladder. No gallstones or secondary signs of acute cholecystitis.    Pneumobilia in the bilateral hepatic lobes with dilated common bile duct   measuring 9 mm. Correlation with history of prior ERCP or sphincterotomy   is recommended.  < end of copied text >    < from: CT Angio Chest w/ IV Cont (04.23.18 @ 17:09) >  IMPRESSION:   Bilateral lobar and segmental pulmonary emboli as above.  Pneumobilia in the visualized abdomen.  Bilateral thyroid nodules for which a nonemergent thyroid ultrasound may   be obtained for further evaluation.    Dr. Pltat discussed these findings with Dr. Barbosa on 4/23/2018 5:30   PM with read back.  < end of copied text >    < from: US TTE 2D F/U, Limited w/o Contrast (ED) (04.23.18 @ 15:35) >  IMPRESSION:   No Pericardial Effusion.  Limited evaluation of the right ventricle without evidence of strain  < end of copied text >    Consultant(s) Notes Reviewed:  n/a    Care Discussed with Consultants/Other Providers: n/a CC: Patient is a 82y old  Female who presents with a chief complaint of right upper back pain.    SUBJECTIVE / OVERNIGHT EVENTS:   English-speaking. Offered  service but pt declined, offering to proceed with her daughter as . Pt reports that her right upper back pain has completely resolved at this time. She feels well and wants to go home. In the ED she was seen by surgery for pneumobilia seen on CT scan with no plan for surgical intervention. Patient and daughter deny history of surgeries or knowledge of full surgical history. At this time patient has no complaints, denying CP, SOB, n/v/d/c, LE pain, back pain, new joint pain, open wounds, change in vision/hearing, headaches, sore throat, runny nose. She does report cramping some times while lying flat. She has had reduced appetite and has last 5 lbs in the past 3 months. No documentation in Allscripts.     I called and spoke with PMD's office, an NP working with Dr. Valera. The patient has a remote history of appendectomy. Her last weights were 140lbs (4/6/18) and 135 lb on visit prior. She has no hx of DVT/PE. No recent falls or immobility. She has hx of HTN, HLD, prediabetes, varicose veins. She has been complaining of back pain for months, prompting MRI spine and CT chest which ultimately led to her being sent to ED for suspicion for PE.     PMD's office: [phone]    MEDICATIONS  (STANDING):  heparin  Infusion. 1000 Unit(s)/Hr (10 mL/Hr) IV Continuous <Continuous>  losartan 100 milliGRAM(s) Oral daily  rosuvastatin 10 milliGRAM(s) Oral at bedtime    MEDICATIONS  (PRN):  heparin  Injectable 2500 Unit(s) IV Push every 6 hours PRN For aPTT between 40 - 57  heparin  Injectable 5000 Unit(s) IV Push every 6 hours PRN For aPTT less than 40    CAPILLARY BLOOD GLUCOSE:  n/a    I&O's Summary: n/a    PHYSICAL EXAM:  GENERAL: NAD, well-developed, sitting up on side of bed in NAD, breathing comfortably  HEAD:  Atraumatic, Normocephalic  EYES: EOMI, PERRLA, conjunctiva and sclera clear, arcus senilis b/l  NECK: Supple, No JVD  CHEST/LUNG: Clear to auscultation bilaterally; No wheeze  HEART: Regular rate and rhythm; No murmurs, rubs, or gallops; +S1,2  ABDOMEN: Soft, Nontender, Nondistended; Bowel sounds present, no abd tenderness to deep palpation throughout.   EXTREMITIES:  2+ Peripheral Pulses, No clubbing, cyanosis, or edema, NO BACK PAIN OR TTP  : no bilateral flank pain, no suprapubic tenderness  PSYCH: AAOx3  NEUROLOGY: non-focal  SKIN: No rashes or lesions    LABS:                        11.6   6.09  )-----------( 189      ( 24 Apr 2018 07:47 )             34.9     04-24    142  |  107  |  16  ----------------------------<  111<H>  3.6   |  23  |  0.67    Ca    8.5      24 Apr 2018 05:20    TPro  7.1  /  Alb  3.9  /  TBili  0.3  /  DBili  x   /  AST  13  /  ALT  15  /  AlkPhos  93  04-23    PT/INR - ( 23 Apr 2018 14:54 )   PT: 11.6 sec;   INR: 1.06 ratio         PTT - ( 24 Apr 2018 05:46 )  PTT:89.2 sec  CARDIAC MARKERS ( 23 Apr 2018 14:54 )  x     / <0.01 ng/mL / 94 U/L / x     / 3.8 ng/mL    RADIOLOGY & ADDITIONAL TESTS:    Imaging Personally Reviewed: yes  < from: US Abdomen Upper Quadrant Right (04.23.18 @ 19:27) >  IMPRESSION:   Fundal gallbladder wall calcification and adenomyomatosis of the   gallbladder. No gallstones or secondary signs of acute cholecystitis.    Pneumobilia in the bilateral hepatic lobes with dilated common bile duct   measuring 9 mm. Correlation with history of prior ERCP or sphincterotomy   is recommended.  < end of copied text >    < from: CT Angio Chest w/ IV Cont (04.23.18 @ 17:09) >  IMPRESSION:   Bilateral lobar and segmental pulmonary emboli as above.  Pneumobilia in the visualized abdomen.  Bilateral thyroid nodules for which a nonemergent thyroid ultrasound may   be obtained for further evaluation.    Dr. Platt discussed these findings with Dr. Barbosa on 4/23/2018 5:30   PM with read back.  < end of copied text >    < from: US TTE 2D F/U, Limited w/o Contrast (ED) (04.23.18 @ 15:35) >  IMPRESSION:   No Pericardial Effusion.  Limited evaluation of the right ventricle without evidence of strain  < end of copied text >    Consultant(s) Notes Reviewed:  n/a    Care Discussed with Consultants/Other Providers: n/a

## 2018-04-24 NOTE — PROGRESS NOTE ADULT - PROBLEM SELECTOR PLAN 1
Pt hemodynamically stable in the setting of segmental and lobar bilateral pulmonary emboli on CT angio chest without evidence of LE DVT. No recent surgeries or immobilization/long flights. A TTE limited study was completed in ED showing no evidence of R heart strain or pericardial effusion.   - c/w heparin gtt for now  - f/u formal TTE  - no history of malignancy, from screening standpoint no history of colonoscopy, normal reported mammogram 2-3 years ago, remote history of normal pap smear.  - hematology c/s for hypercoagulability workup.  - obtain additional history from pt's PMD Pt hemodynamically stable in the setting of segmental and lobar bilateral pulmonary emboli on CT angio chest without evidence of LE DVT. No recent surgeries or immobilization/long flights. A TTE limited study was completed in ED showing no evidence of R heart strain or pericardial effusion.   - c/w heparin gtt for now  - f/u formal TTE  - no history of malignancy, from screening standpoint no history of colonoscopy, normal reported mammogram 2-3 years ago, remote history of normal pap smear.  - hematology c/s for hypercoagulability workup.  - spoke with PMD, no hx of DVT/PE, recent prolonged travel or weight loss, malignancy. Has had chronic back pain x 6 mo.

## 2018-04-25 ENCOUNTER — TRANSCRIPTION ENCOUNTER (OUTPATIENT)
Age: 83
End: 2018-04-25

## 2018-04-25 VITALS
OXYGEN SATURATION: 96 % | RESPIRATION RATE: 18 BRPM | DIASTOLIC BLOOD PRESSURE: 57 MMHG | SYSTOLIC BLOOD PRESSURE: 108 MMHG | HEART RATE: 73 BPM | TEMPERATURE: 98 F

## 2018-04-25 LAB
ANION GAP SERPL CALC-SCNC: 13 MMOL/L — SIGNIFICANT CHANGE UP (ref 5–17)
APTT BLD: 58.2 SEC — HIGH (ref 27.5–37.4)
BUN SERPL-MCNC: 16 MG/DL — SIGNIFICANT CHANGE UP (ref 7–23)
CALCIUM SERPL-MCNC: 8.9 MG/DL — SIGNIFICANT CHANGE UP (ref 8.4–10.5)
CHLORIDE SERPL-SCNC: 105 MMOL/L — SIGNIFICANT CHANGE UP (ref 96–108)
CO2 SERPL-SCNC: 25 MMOL/L — SIGNIFICANT CHANGE UP (ref 22–31)
CREAT SERPL-MCNC: 0.72 MG/DL — SIGNIFICANT CHANGE UP (ref 0.5–1.3)
GLUCOSE SERPL-MCNC: 111 MG/DL — HIGH (ref 70–99)
HCT VFR BLD CALC: 37.9 % — SIGNIFICANT CHANGE UP (ref 34.5–45)
HGB BLD-MCNC: 11.9 G/DL — SIGNIFICANT CHANGE UP (ref 11.5–15.5)
INR BLD: 1.06 RATIO — SIGNIFICANT CHANGE UP (ref 0.88–1.16)
MCHC RBC-ENTMCNC: 27.8 PG — SIGNIFICANT CHANGE UP (ref 27–34)
MCHC RBC-ENTMCNC: 31.4 GM/DL — LOW (ref 32–36)
MCV RBC AUTO: 88.6 FL — SIGNIFICANT CHANGE UP (ref 80–100)
PLATELET # BLD AUTO: 197 K/UL — SIGNIFICANT CHANGE UP (ref 150–400)
POTASSIUM SERPL-MCNC: 3.7 MMOL/L — SIGNIFICANT CHANGE UP (ref 3.5–5.3)
POTASSIUM SERPL-SCNC: 3.7 MMOL/L — SIGNIFICANT CHANGE UP (ref 3.5–5.3)
PROTHROM AB SERPL-ACNC: 12 SEC — SIGNIFICANT CHANGE UP (ref 10–13.1)
RBC # BLD: 4.28 M/UL — SIGNIFICANT CHANGE UP (ref 3.8–5.2)
RBC # FLD: 13.7 % — SIGNIFICANT CHANGE UP (ref 10.3–14.5)
SODIUM SERPL-SCNC: 143 MMOL/L — SIGNIFICANT CHANGE UP (ref 135–145)
T4 FREE SERPL-MCNC: 1.2 NG/DL — SIGNIFICANT CHANGE UP (ref 0.9–1.8)
TSH SERPL-MCNC: 0.87 UIU/ML — SIGNIFICANT CHANGE UP (ref 0.27–4.2)
WBC # BLD: 6.66 K/UL — SIGNIFICANT CHANGE UP (ref 3.8–10.5)
WBC # FLD AUTO: 6.66 K/UL — SIGNIFICANT CHANGE UP (ref 3.8–10.5)

## 2018-04-25 PROCEDURE — 83880 ASSAY OF NATRIURETIC PEPTIDE: CPT

## 2018-04-25 PROCEDURE — 84443 ASSAY THYROID STIM HORMONE: CPT

## 2018-04-25 PROCEDURE — 82330 ASSAY OF CALCIUM: CPT

## 2018-04-25 PROCEDURE — 99239 HOSP IP/OBS DSCHRG MGMT >30: CPT

## 2018-04-25 PROCEDURE — 93970 EXTREMITY STUDY: CPT

## 2018-04-25 PROCEDURE — 86900 BLOOD TYPING SEROLOGIC ABO: CPT

## 2018-04-25 PROCEDURE — 84439 ASSAY OF FREE THYROXINE: CPT

## 2018-04-25 PROCEDURE — 82553 CREATINE MB FRACTION: CPT

## 2018-04-25 PROCEDURE — 85610 PROTHROMBIN TIME: CPT

## 2018-04-25 PROCEDURE — 85014 HEMATOCRIT: CPT

## 2018-04-25 PROCEDURE — 82803 BLOOD GASES ANY COMBINATION: CPT

## 2018-04-25 PROCEDURE — 93308 TTE F-UP OR LMTD: CPT

## 2018-04-25 PROCEDURE — 80048 BASIC METABOLIC PNL TOTAL CA: CPT

## 2018-04-25 PROCEDURE — 99285 EMERGENCY DEPT VISIT HI MDM: CPT | Mod: 25

## 2018-04-25 PROCEDURE — 71275 CT ANGIOGRAPHY CHEST: CPT

## 2018-04-25 PROCEDURE — 80053 COMPREHEN METABOLIC PANEL: CPT

## 2018-04-25 PROCEDURE — 84295 ASSAY OF SERUM SODIUM: CPT

## 2018-04-25 PROCEDURE — 86901 BLOOD TYPING SEROLOGIC RH(D): CPT

## 2018-04-25 PROCEDURE — 83690 ASSAY OF LIPASE: CPT

## 2018-04-25 PROCEDURE — 84132 ASSAY OF SERUM POTASSIUM: CPT

## 2018-04-25 PROCEDURE — 83605 ASSAY OF LACTIC ACID: CPT

## 2018-04-25 PROCEDURE — 85730 THROMBOPLASTIN TIME PARTIAL: CPT

## 2018-04-25 PROCEDURE — 76705 ECHO EXAM OF ABDOMEN: CPT

## 2018-04-25 PROCEDURE — 82435 ASSAY OF BLOOD CHLORIDE: CPT

## 2018-04-25 PROCEDURE — 93005 ELECTROCARDIOGRAM TRACING: CPT

## 2018-04-25 PROCEDURE — 84484 ASSAY OF TROPONIN QUANT: CPT

## 2018-04-25 PROCEDURE — 82550 ASSAY OF CK (CPK): CPT

## 2018-04-25 PROCEDURE — 86850 RBC ANTIBODY SCREEN: CPT

## 2018-04-25 PROCEDURE — 99222 1ST HOSP IP/OBS MODERATE 55: CPT | Mod: GC

## 2018-04-25 PROCEDURE — 85027 COMPLETE CBC AUTOMATED: CPT

## 2018-04-25 PROCEDURE — 82947 ASSAY GLUCOSE BLOOD QUANT: CPT

## 2018-04-25 PROCEDURE — 96374 THER/PROPH/DIAG INJ IV PUSH: CPT | Mod: XU

## 2018-04-25 RX ORDER — ROSUVASTATIN CALCIUM 5 MG/1
1 TABLET ORAL
Qty: 0 | Refills: 0 | COMMUNITY

## 2018-04-25 RX ORDER — IBUPROFEN 200 MG
1 TABLET ORAL
Qty: 0 | Refills: 0 | COMMUNITY

## 2018-04-25 RX ORDER — APIXABAN 2.5 MG/1
2 TABLET, FILM COATED ORAL
Qty: 90 | Refills: 0 | OUTPATIENT
Start: 2018-04-25

## 2018-04-25 RX ORDER — ROSUVASTATIN CALCIUM 5 MG/1
1 TABLET ORAL
Qty: 0 | Refills: 0 | DISCHARGE
Start: 2018-04-25

## 2018-04-25 RX ADMIN — LOSARTAN POTASSIUM 100 MILLIGRAM(S): 100 TABLET, FILM COATED ORAL at 05:31

## 2018-04-25 RX ADMIN — HEPARIN SODIUM 1000 UNIT(S)/HR: 5000 INJECTION INTRAVENOUS; SUBCUTANEOUS at 11:59

## 2018-04-25 NOTE — PROGRESS NOTE ADULT - PROBLEM SELECTOR PLAN 3
Incidental thyroid nodules bilaterally 0.7cm and 1.2 cm found incidentally on CT chest.   -TSH, T4 in AM.  -outpatient f/u with thyroid US.
Incidental thyroid nodules bilaterally 0.7cm and 1.2 cm found incidentally on CT chest.   -TSH, T4 in lab  -outpatient f/u with thyroid US.

## 2018-04-25 NOTE — PROGRESS NOTE ADULT - SUBJECTIVE AND OBJECTIVE BOX
Patient is a 82y old  Female who presents with a chief complaint of     SUBJECTIVE / OVERNIGHT EVENTS: Patient seen and examined at bedside - patient feels well, walking around, denies sob, chest pain, no palpitations    ROS:  All other review of systems negative    Allergies    Drug Allergies Not Recorded  Seafood (Hives)    Intolerances        MEDICATIONS  (STANDING):  heparin  Infusion. 1000 Unit(s)/Hr (10 mL/Hr) IV Continuous <Continuous>  losartan 100 milliGRAM(s) Oral daily  rosuvastatin 10 milliGRAM(s) Oral at bedtime    MEDICATIONS  (PRN):  heparin  Injectable 2500 Unit(s) IV Push every 6 hours PRN For aPTT between 40 - 57  heparin  Injectable 5000 Unit(s) IV Push every 6 hours PRN For aPTT less than 40      Vital Signs Last 24 Hrs  T(C): 36.7 (25 Apr 2018 05:30), Max: 37.6 (24 Apr 2018 20:50)  T(F): 98 (25 Apr 2018 05:30), Max: 99.7 (24 Apr 2018 20:50)  HR: 63 (25 Apr 2018 05:30) (63 - 81)  BP: 138/62 (25 Apr 2018 05:30) (105/60 - 138/62)  BP(mean): --  RR: 18 (25 Apr 2018 05:30) (18 - 18)  SpO2: 93% (25 Apr 2018 05:30) (93% - 95%)  CAPILLARY BLOOD GLUCOSE        I&O's Summary    24 Apr 2018 07:01  -  25 Apr 2018 07:00  --------------------------------------------------------  IN: 720 mL / OUT: 0 mL / NET: 720 mL        PHYSICAL EXAM:  GENERAL: NAD, well-developed  HEAD:  Atraumatic, Normocephalic  EYES: EOMI, PERRLA, conjunctiva and sclera clear  NECK: Supple, No JVD  CHEST/LUNG: Clear to auscultation bilaterally; No wheeze  HEART: Regular rate and rhythm; No murmurs, rubs, or gallops  ABDOMEN: Soft, Nontender, Nondistended; Bowel sounds present  EXTREMITIES:  2+ Peripheral Pulses, No clubbing, cyanosis, or edema  NEUROLOGY: AAOx3, non-focal  PSYCH: calm  SKIN: No rashes or lesions    LABS:                        13.4   6.3   )-----------( 197      ( 24 Apr 2018 12:54 )             39.5     04-25    143  |  105  |  16  ----------------------------<  111<H>  3.7   |  25  |  0.72    Ca    8.9      25 Apr 2018 06:49    TPro  7.1  /  Alb  3.9  /  TBili  0.3  /  DBili  x   /  AST  13  /  ALT  15  /  AlkPhos  93  04-23    PT/INR - ( 25 Apr 2018 08:00 )   PT: 12.0 sec;   INR: 1.06 ratio         PTT - ( 25 Apr 2018 08:00 )  PTT:58.2 sec  CARDIAC MARKERS ( 23 Apr 2018 14:54 )  x     / <0.01 ng/mL / 94 U/L / x     / 3.8 ng/mL            Consultant(s) Notes Reviewed:      Care Discussed with Consultants/Other Providers:    Case Discussed with Family:

## 2018-04-25 NOTE — CHART NOTE - NSCHARTNOTEFT_GEN_A_CORE
Pt medically cleared for discharge as d/w Dr. Munson. Pt to be discharged on Eliquis as d/w attending and Heme. Instructed pt to start tonight. Outpatient follow up with Hematology and Surgical oncology.
Initiate Treatment: Doxycycline 100 mg once daily x 10 days
Detail Level: Zone

## 2018-04-25 NOTE — DISCHARGE NOTE ADULT - MEDICATION SUMMARY - MEDICATIONS TO TAKE
I will START or STAY ON the medications listed below when I get home from the hospital:    apixaban 5 mg oral tablet  -- 2 tab(s) by mouth 2 times a day x 7 days then  1 tab 2 times a day continously  -- Check with your doctor before becoming pregnant.  It is very important that you take or use this exactly as directed.  Do not skip doses or discontinue unless directed by your doctor.  Obtain medical advice before taking any non-prescription drugs as some may affect the action of this medication.    -- Indication: For Pulmonary embolus    rosuvastatin 10 mg oral tablet  -- 1 tab(s) by mouth once a day (at bedtime)  -- Indication: For High cholesterol    olmesartan-hydrochlorothiazide 40 mg-25 mg oral tablet  -- 1 tab(s) by mouth once a day  -- Indication: For HTN (hypertension)    Singulair 10 mg oral tablet  -- 1 tab(s) by mouth once a day  -- Indication: For allergies

## 2018-04-25 NOTE — PROGRESS NOTE ADULT - PROBLEM SELECTOR PROBLEM 1
Other acute pulmonary embolism without acute cor pulmonale
Other acute pulmonary embolism without acute cor pulmonale

## 2018-04-25 NOTE — DISCHARGE NOTE ADULT - CARE PLAN
Principal Discharge DX:	Pulmonary embolus  Secondary Diagnosis:	Calcification of gallbladder  Assessment and plan of treatment:	Follow up with Surgical oncologist outpatient for further work up  Secondary Diagnosis:	HTN (hypertension)  Assessment and plan of treatment:	Low salt diet  Activity as tolerated.  Take all medication as prescribed.  Follow up with your medical doctor for routine blood pressure monitoring at your next visit.  Notify your doctor if you have any of the following symptoms:   Dizziness, Lightheadedness, Blurry vision, Headache, Chest pain, Shortness of breath Principal Discharge DX:	Pulmonary embolus  Goal:	resolution  Assessment and plan of treatment:	Follow up with your Primary care doctor in 1 week  Follow up with Hematology outpatient for Hypercoagulable work-up- [facility]-[phone]  Secondary Diagnosis:	Calcification of gallbladder  Assessment and plan of treatment:	Follow up with Surgical oncologist outpatient for further work up  Secondary Diagnosis:	HTN (hypertension)  Assessment and plan of treatment:	Low salt diet  Activity as tolerated.  Take all medication as prescribed.  Follow up with your medical doctor for routine blood pressure monitoring at your next visit.  Notify your doctor if you have any of the following symptoms:   Dizziness, Lightheadedness, Blurry vision, Headache, Chest pain, Shortness of breath Principal Discharge DX:	Pulmonary embolus  Goal:	resolution  Assessment and plan of treatment:	Follow up with your Primary care doctor in 1 week  Please start Eliquis tonight  Follow up with Hematology outpatient for Hypercoagulable work-up- [facility]-[phone]  Secondary Diagnosis:	Calcification of gallbladder  Assessment and plan of treatment:	Follow up with Surgical oncologist outpatient for further work up  Secondary Diagnosis:	HTN (hypertension)  Assessment and plan of treatment:	Low salt diet  Activity as tolerated.  Take all medication as prescribed.  Follow up with your medical doctor for routine blood pressure monitoring at your next visit.  Notify your doctor if you have any of the following symptoms:   Dizziness, Lightheadedness, Blurry vision, Headache, Chest pain, Shortness of breath

## 2018-04-25 NOTE — DISCHARGE NOTE ADULT - PLAN OF CARE
Follow up with Surgical oncologist outpatient for further work up Low salt diet  Activity as tolerated.  Take all medication as prescribed.  Follow up with your medical doctor for routine blood pressure monitoring at your next visit.  Notify your doctor if you have any of the following symptoms:   Dizziness, Lightheadedness, Blurry vision, Headache, Chest pain, Shortness of breath resolution Follow up with your Primary care doctor in 1 week  Follow up with Hematology outpatient for Hypercoagulable work-up- [facility]-[phone] Follow up with your Primary care doctor in 1 week  Please start Eliquis tonight  Follow up with Hematology outpatient for Hypercoagulable work-up- [facility]-[phone]

## 2018-04-25 NOTE — PROGRESS NOTE ADULT - PROBLEM SELECTOR PLAN 1
Pt hemodynamically stable in the setting of segmental and lobar bilateral pulmonary emboli on CT angio chest without evidence of LE DVT. No recent surgeries or immobilization/long flights. A TTE limited study was completed in ED showing no evidence of R heart strain or pericardial effusion.   currently on heparin gtt - will change to DOAC - likely Eliquis given less adverse events in elderly. Will check insurance coverage  - no history of malignancy, from screening standpoint no history of colonoscopy, normal reported mammogram 2-3 years ago, remote history of normal pap smear.  Did travel to [facility] in October, but this was about 4 months prior to onset of symptoms per daughter.  - hematology c/s for hypercoagulability workup.  - spoke with PMD, no hx of DVT/PE, recent prolonged travel or weight loss, malignancy. Has had chronic back pain x 6 mo.

## 2018-04-25 NOTE — PROGRESS NOTE ADULT - PROBLEM SELECTOR PLAN 2
Pt and family deny history of surgeries or ERCP; PMD confirmed remote history of appendectomy. Surgery evaluated pt in ED with no plan for surgical intervention. In addition to calcification, incidental pneumobilia and dilated CBD seen on RUQ US and CT angio/chest.   -surgery rec's appreciated.  -outpatient follow-up for calcification of fundal wall gallbladder per surgery.  -no surgical intervention for incidental pneumobilia.   -in absence of fevers, abd pain, elevated bilirubin, no acute intervention for incidental CBD dilatation.

## 2018-04-25 NOTE — DISCHARGE NOTE ADULT - ADDITIONAL INSTRUCTIONS
Please do not have any epidural or any invasive procedures at this time. Follow up with your Primary care doctor.  Follow up with Hematology within 1 week  Follow up with Surgical oncology in 1 week

## 2018-04-25 NOTE — PROGRESS NOTE ADULT - PROBLEM SELECTOR PLAN 6
DVT ppx: heparin gtt full a/c    Jeison Daniels  [facility], PGY-2  Pager [phone] / [phone]
DVT ppx: heparin gtt full a/c

## 2018-04-25 NOTE — CONSULT NOTE ADULT - SUBJECTIVE AND OBJECTIVE BOX
HPI:  81 y/o F Hx HTN, HLD, recent Flu in 2018 with reactive airway dz presented to PMD with 1-2 months of R sided upper back pain after moving heavy furniture, sent by PMD for CT spine (18), showed "clots in the chest", sent to ED for further evaluation and treatment.  Patient has no complaint currently.  Denied CP, SOB/DARDEN, back pain, syncope, LE swelling or pain, recent travel or surgery, immobility or prior/FHx of blood clot. (2018 23:39)      Allergies    Drug Allergies Not Recorded  Seafood (Hives)    Intolerances        MEDICATIONS  (STANDING):  heparin  Infusion. 1000 Unit(s)/Hr (10 mL/Hr) IV Continuous <Continuous>  losartan 100 milliGRAM(s) Oral daily  rosuvastatin 10 milliGRAM(s) Oral at bedtime    MEDICATIONS  (PRN):  heparin  Injectable 2500 Unit(s) IV Push every 6 hours PRN For aPTT between 40 - 57  heparin  Injectable 5000 Unit(s) IV Push every 6 hours PRN For aPTT less than 40      PAST MEDICAL & SURGICAL HISTORY:  GERD (gastroesophageal reflux disease)  HTN (hypertension)  S/P appendectomy      FAMILY HISTORY:  Family history of diabetes mellitus (Mother): Mother       SOCIAL HISTORY: No EtOH, no tobacco    REVIEW OF SYSTEMS:    CONSTITUTIONAL: No weakness, fevers or chills  EYES/ENT: No visual changes;  No vertigo or throat pain   NECK: No pain or stiffness  RESPIRATORY: No cough, wheezing, hemoptysis; No shortness of breath  CARDIOVASCULAR: No chest pain or palpitations  GASTROINTESTINAL: No abdominal or epigastric pain. No nausea, vomiting, or hematemesis; No diarrhea or constipation. No melena or hematochezia.  GENITOURINARY: No dysuria, frequency or hematuria  NEUROLOGICAL: No numbness or weakness  SKIN: No itching, burning, rashes, or lesions   All other review of systems is negative unless indicated above.        T(F): 98.2 (18 @ 12:16), Max: 99.7 (18 @ 20:50)  HR: 73 (18 @ 12:16)  BP: 108/57 (18 @ 12:16)  RR: 18 (18 @ 12:16)  SpO2: 96% (04-25-18 @ 12:16)  Wt(kg): --    GENERAL: NAD, well-developed  HEAD:  Atraumatic, Normocephalic  EYES: EOMI, PERRLA, conjunctiva and sclera clear  NECK: Supple, No JVD  CHEST/LUNG: Clear to auscultation bilaterally; No wheeze  HEART: Regular rate and rhythm; No murmurs, rubs, or gallops  ABDOMEN: Soft, Nontender, Nondistended; Bowel sounds present  EXTREMITIES:  2+ Peripheral Pulses, No clubbing, cyanosis, or edema  NEUROLOGY: non-focal  SKIN: No rashes or lesions                          11.9   6.66  )-----------( 197      ( 2018 08:00 )             37.9       04-25    143  |  105  |  16  ----------------------------<  111<H>  3.7   |  25  |  0.72    Ca    8.9      2018 06:49 HPI:  83 y/o F Hx HTN, HLD, admitted on  for newly diagnosed bl PE. Per patients daughter, she has been having ongoing upper/mid back pains for the last 6 months. She has seen her PCP severeal times and has had repeated MRIS, all which are reportedly negative. Per daughter, her PCP recently did CT chest, and called to inform them to go to the hospital as she was found to have new blood clots in the lungs. She denies any chest pain, dyspnea, palpitations, cough. Denies any OTC medications, no recent trauma or immobilization. Denies personal and family history of thrombosis. She reports she had mammogram last year, has never had colonoscopy or PAP smear.   She was also found to have         Allergies    Drug Allergies Not Recorded  Seafood (Hives)    Intolerances        MEDICATIONS  (STANDING):  heparin  Infusion. 1000 Unit(s)/Hr (10 mL/Hr) IV Continuous <Continuous>  losartan 100 milliGRAM(s) Oral daily  rosuvastatin 10 milliGRAM(s) Oral at bedtime    MEDICATIONS  (PRN):  heparin  Injectable 2500 Unit(s) IV Push every 6 hours PRN For aPTT between 40 - 57  heparin  Injectable 5000 Unit(s) IV Push every 6 hours PRN For aPTT less than 40      PAST MEDICAL & SURGICAL HISTORY:  GERD (gastroesophageal reflux disease)  HTN (hypertension)  S/P appendectomy      FAMILY HISTORY:  Family history of diabetes mellitus (Mother): Mother       SOCIAL HISTORY: No EtOH, no tobacco    REVIEW OF SYSTEMS:    CONSTITUTIONAL: No weakness, fevers or chills  EYES/ENT: No visual changes;  No vertigo or throat pain   NECK: No pain or stiffness  RESPIRATORY: No cough, wheezing, hemoptysis; No shortness of breath  CARDIOVASCULAR: No chest pain or palpitations  GASTROINTESTINAL: No abdominal or epigastric pain. No nausea, vomiting, or hematemesis; No diarrhea or constipation. No melena or hematochezia.  GENITOURINARY: No dysuria, frequency or hematuria  NEUROLOGICAL: No numbness or weakness, + mid/upper back pain  SKIN: No itching, burning, rashes, or lesions   All other review of systems is negative unless indicated above.        T(F): 98.2 (18 @ 12:16), Max: 99.7 (18 @ 20:50)  HR: 73 (18 @ 12:16)  BP: 108/57 (18 @ 12:16)  RR: 18 (18 @ 12:16)  SpO2: 96% (18 @ 12:16)  Wt(kg): --    GENERAL: NAD, well-developed  HEAD:  Atraumatic, Normocephalic  EYES: EOMI, PERRLA, conjunctiva and sclera clear  NECK: Supple, No JVD  CHEST/LUNG: Clear to auscultation bilaterally; No wheeze  HEART: Regular rate and rhythm; No murmurs, rubs, or gallops  ABDOMEN: Soft, Nontender, Nondistended; Bowel sounds present  EXTREMITIES:  2+ Peripheral Pulses, No clubbing, cyanosis, or edema  NEUROLOGY: non-focal  SKIN: No rashes or lesions                          11.9   6.66  )-----------( 197      ( 2018 08:00 )             37.9       -    143  |  105  |  16  ----------------------------<  111<H>  3.7   |  25  |  0.72    Ca    8.9      2018 06:49 HPI:  83 y/o F Hx HTN, HLD, admitted on  for newly diagnosed bl PE. Per patients daughter, she has been having ongoing upper/mid back pains for the last 6 months. She has seen her PCP severeal times and has had repeated MRIS, all which are reportedly negative. Per daughter, her PCP recently did CT chest, and called to inform them to go to the hospital as she was found to have new blood clots in the lungs. She denies any chest pain, dyspnea, palpitations, cough. Denies any OTC medications, no recent trauma or immobilization. Denies personal and family history of thrombosis. She reports she had mammogram last year, has never had colonoscopy or PAP smear.   She was also found to have pneumobilia and Fundal gallbladder wall calcification. She was seen by surgery, who recommended outpatient followup with surg/onc.       Allergies    Drug Allergies Not Recorded  Seafood (Hives)    Intolerances        MEDICATIONS  (STANDING):  heparin  Infusion. 1000 Unit(s)/Hr (10 mL/Hr) IV Continuous <Continuous>  losartan 100 milliGRAM(s) Oral daily  rosuvastatin 10 milliGRAM(s) Oral at bedtime    MEDICATIONS  (PRN):  heparin  Injectable 2500 Unit(s) IV Push every 6 hours PRN For aPTT between 40 - 57  heparin  Injectable 5000 Unit(s) IV Push every 6 hours PRN For aPTT less than 40      PAST MEDICAL & SURGICAL HISTORY:  GERD (gastroesophageal reflux disease)  HTN (hypertension)  S/P appendectomy      FAMILY HISTORY:  Family history of diabetes mellitus (Mother): Mother       SOCIAL HISTORY: No EtOH, no tobacco    REVIEW OF SYSTEMS:    CONSTITUTIONAL: No weakness, fevers or chills  EYES/ENT: No visual changes;  No vertigo or throat pain   NECK: No pain or stiffness  RESPIRATORY: No cough, wheezing, hemoptysis; No shortness of breath  CARDIOVASCULAR: No chest pain or palpitations  GASTROINTESTINAL: No abdominal or epigastric pain. No nausea, vomiting, or hematemesis; No diarrhea or constipation. No melena or hematochezia.  GENITOURINARY: No dysuria, frequency or hematuria  NEUROLOGICAL: No numbness or weakness, + mid/upper back pain  SKIN: No itching, burning, rashes, or lesions   All other review of systems is negative unless indicated above.        T(F): 98.2 (18 @ 12:16), Max: 99.7 (18 @ 20:50)  HR: 73 (18 @ 12:16)  BP: 108/57 (18 @ 12:16)  RR: 18 (18 @ 12:16)  SpO2: 96% (18 @ 12:16)  Wt(kg): --    GENERAL: NAD, well-developed  HEAD:  Atraumatic, Normocephalic  EYES: EOMI, PERRLA, conjunctiva and sclera clear  NECK: Supple, No JVD  CHEST/LUNG: Clear to auscultation bilaterally; No wheeze  HEART: Regular rate and rhythm; No murmurs, rubs, or gallops  ABDOMEN: Soft, Nontender, Nondistended; Bowel sounds present  EXTREMITIES:  2+ Peripheral Pulses, No clubbing, cyanosis, or edema  NEUROLOGY: non-focal  SKIN: No rashes or lesions                          11.9   6.66  )-----------( 197      ( 2018 08:00 )             37.9           143  |  105  |  16  ----------------------------<  111<H>  3.7   |  25  |  0.72    Ca    8.9      2018 06:49

## 2018-04-25 NOTE — CONSULT NOTE ADULT - ATTENDING COMMENTS
Pt seen and examined on 4/23 at 9pm, agree with above. Pt presented to her PMD with back pain as described above; CT demonstrated bilateral segmental PE's and incidental pneumobilia. Pt has no history of abdominal pain or other abdominal complaints, has normal appetite, no N/V, no recent weight loss, no changes in bowel habits. Labs with normal LFT's, normal WBC. I personally reviewed pt's CT and RUQ U/S images, which demonstrate bilateral pneumobilia and fundal calcification of the gallbladder.    - Pneumobilia is most commonly seen after ERCP, but pt and her daughter deny that she has ever had an endoscopic procedure. However, pt is a poor historian (for instance, denied having had any abdominal surgery but has well-healed incisions, the history of which she is unsure).   - Pneumobilia can also be seen as a result of cholecysto- or choledocho-enteric fistulae, although there is no evidence of this on ultrasound  - Isolated fundal gallbladder calcifications can be benign but can also be associated with cholangiocarcinoma. I explained to pt's daughter that this finding is abnormal and that she should followup with a Surgical Oncologist for further discussion of the risks/ benefits of further workup (Dr. Omar Garcia).
newly diagnosed pe in setting o ffinding of calcified gall bladder. discussions with family indicate patient would never want surgery  -to see onc surgeon as outpatient  -ok with starting doac for new pe  -no indication for hypercoag workup at this time, possibly malignancy related, advised follow up for further workup

## 2018-04-25 NOTE — CONSULT NOTE ADULT - ASSESSMENT
82yoF with HTN, HLD admitted for incidental PEs found on outpatient imaging. Also found to have calcification of gallbladder this admission.     #PE, unclear etiology. unprovoked  - unclear if possible 2/2 ?underlying malignancy with calcified gallbladder   - will need to followup with surg/onc as outpatient   - agree with transition to PO AC. Had a long discussed with patient's daughters (Anastasia) and granddaughter at bedside and discussed AC options. Agreed on NOAC. Eliquis approved today.   - would start Eliquis 2 hours after discontinuation of heparin gtt. Start 10mg BID x 1 week, then 5mg BID  - will need outpatient followup with hematology for monitoring 82yoF with HTN, HLD admitted for incidental PEs found on outpatient imaging. Also found to have calcification of gallbladder this admission.     #PE, unclear etiology. unprovoked  - unclear if possible 2/2 ?underlying malignancy with calcified gallbladder   - will need to followup with surg/onc as outpatient   - agree with transition to PO AC. Had a long discussed with patient's daughters (Anastasia) and granddaughter at bedside and discussed AC options. All were in agreement on NOAC. Eliquis approved today.   - would start Eliquis 2 hours after discontinuation of heparin gtt. Start 10mg BID x 1 week, then 5mg BID  - will need outpatient followup with hematology for monitoring 82yoF with HTN, HLD admitted for incidental PEs found on outpatient imaging. Also found to have calcification of gallbladder this admission.     #bl lobar and segmental PE, unclear etiology. unprovoked  - unclear if possible 2/2 ?underlying malignancy with calcified gallbladder   - will need to followup with surg/onc as outpatient   - agree with transition to PO AC. Had a long discussed with patient's daughters (Anastasia) and granddaughter at bedside and discussed AC options. All were in agreement on NOAC. Eliquis approved today.   - would start Eliquis 2 hours after discontinuation of heparin gtt. Start 10mg BID x 1 week, then 5mg BID  - will need outpatient followup with hematology for monitoring

## 2018-04-25 NOTE — DISCHARGE NOTE ADULT - HOSPITAL COURSE
83 y/o F Hx HTN, HLD, recent Flu in Feb 2018 with reactive airway dz presented to PMD with 1-2 months of R sided upper back pain after moving heavy furniture, sent by PMD for CT spine (4/20/18), showed "clots in the chest", sent to ED for further evaluation and treatment. CT chest shows Bilateral lobar and segmental pulmonary emboli  and pneumobilia. Surgery consulted. No surgical intervention warranted at this time. Recommended for pt to f/u outpatient for further work up with Surgical oncologist. Hematology consulted. Recommended outpatient hypercoagulable work up in [facility].  Pt instructed to follow up with PMD, Surgical oncology and Hematology. 82yoF with PMH HTN, HLD, recent R back pain evaluated by CT spine found to have pneumobilia with dilated CBD w/o abdominal pain evaluated by surgery with no surgical intervention planned for pneumobilia.   admitted for incidental finding of Bilateral lobar and segmental pulmonary emboli bilaterally on heparin gtt.     Problem/Plan - 1:  ·  Problem: Other acute pulmonary embolism without acute cor pulmonale.  Plan: Pt hemodynamically stable in the setting of segmental and lobar bilateral pulmonary emboli on CT angio chest without evidence of LE DVT. No recent surgeries or immobilization/long flights. A TTE limited study was completed in ED showing no evidence of R heart strain or pericardial effusion.   changed to DOAC - Eliquis given less adverse events in elderly. Patient's daughter was able to obtain the prescription  - no history of malignancy, from screening standpoint no history of colonoscopy, normal reported mammogram 2-3 years ago, remote history of normal pap smear.  Did travel to [facility] in October, but this was about 4 months prior to onset of symptoms per daughter.  - hematology c/s for hypercoagulability workup.  - spoke with PMD, no hx of DVT/PE, recent prolonged travel or weight loss, malignancy. Has had chronic back pain x 6 mo.      Problem/Plan - 2:  ·  Problem: Calcification of gallbladder.  Plan: Pt and family deny history of surgeries or ERCP; PMD confirmed remote history of appendectomy. Surgery evaluated pt in ED with no plan for surgical intervention. In addition to calcification, incidental pneumobilia and dilated CBD seen on RUQ US and CT angio/chest.   -surgery rec's appreciated.  -outpatient follow-up for calcification of fundal wall gallbladder per surgery.  -no surgical intervention for incidental pneumobilia.   -in absence of fevers, abd pain, elevated bilirubin, no acute intervention for incidental CBD dilatation.      Problem/Plan - 3:  ·  Problem: Thyroid nodule.  Plan: Incidental thyroid nodules bilaterally 0.7cm and 1.2 cm found incidentally on CT chest.   -TSH, T4 in lab  -outpatient f/u with thyroid US.      Problem/Plan - 4:  ·  Problem: Essential hypertension.  Plan: Pt was previously on statin and ARB but non-adherent with these medications at home. BP well-controlled currently. ARB was resumed.  - c/w losartan 100mg daily    Recommended outpatient hypercoagulable work up in [facility].  Pt instructed to follow up with PMD, Surgical oncology and Hematology.

## 2018-04-25 NOTE — PROGRESS NOTE ADULT - PROBLEM SELECTOR PLAN 5
Previously on statin but non-adherent. Statin resumed in ED.  -c/w rosuvastatin 10mg qhs.
Previously on statin but non-adherent. Statin resumed in ED.  -c/w rosuvastatin 10mg qhs.

## 2018-04-25 NOTE — PROGRESS NOTE ADULT - ASSESSMENT
82yoF with PMH HTN, HLD, recent R back pain evaluated by CT spine found to have pneumobilia with dilated CBD w/o abdominal pain evaluated by surgery with no surgical intervention planned for pneumobilia.   admitted for incidental finding of Bilateral lobar and segmental pulmonary emboli bilaterally on heparin gtt.
82yoF with PMH HTN, HLD, recent R back pain evaluated by CT spine found to have b/l subsegmental PE and pneumobilia with dilated CBD w/o abdominal pain, admitted for subsegmental bilateral PE, evaluated by surgery with no surgical intervention planned for pneumobilia.

## 2018-04-25 NOTE — DISCHARGE NOTE ADULT - CARE PROVIDER_API CALL
Omar Garcia), Surgery; Surgical Oncology  [address] [place] [address]  Phone: [phone]  Fax: [phone]

## 2018-04-25 NOTE — PROGRESS NOTE ADULT - PROBLEM SELECTOR PLAN 4
Pt was previously on statin and ARB but non-adherent with these medications at home. BP well-controlled currently. ARB was resumed.  - c/w losartan 100mg daily   - vitals per routine.

## 2018-05-02 PROBLEM — I10 ESSENTIAL (PRIMARY) HYPERTENSION: Chronic | Status: ACTIVE | Noted: 2018-04-23

## 2018-05-02 PROBLEM — Z00.00 ENCOUNTER FOR PREVENTIVE HEALTH EXAMINATION: Status: ACTIVE | Noted: 2018-05-02

## 2018-05-02 PROBLEM — K21.9 GASTRO-ESOPHAGEAL REFLUX DISEASE WITHOUT ESOPHAGITIS: Chronic | Status: ACTIVE | Noted: 2018-04-23

## 2018-05-08 ENCOUNTER — OUTPATIENT (OUTPATIENT)
Dept: OUTPATIENT SERVICES | Facility: HOSPITAL | Age: 83
LOS: 1 days | Discharge: HOME | End: 2018-05-08

## 2018-05-08 DIAGNOSIS — Z90.49 ACQUIRED ABSENCE OF OTHER SPECIFIED PARTS OF DIGESTIVE TRACT: Chronic | ICD-10-CM

## 2018-05-08 DIAGNOSIS — I26.99 OTHER PULMONARY EMBOLISM WITHOUT ACUTE COR PULMONALE: ICD-10-CM

## 2018-05-09 ENCOUNTER — APPOINTMENT (OUTPATIENT)
Dept: HEMATOLOGY ONCOLOGY | Facility: CLINIC | Age: 83
End: 2018-05-09
Payer: MEDICARE

## 2018-05-09 ENCOUNTER — RESULT REVIEW (OUTPATIENT)
Age: 83
End: 2018-05-09

## 2018-05-09 ENCOUNTER — OUTPATIENT (OUTPATIENT)
Dept: OUTPATIENT SERVICES | Facility: HOSPITAL | Age: 83
LOS: 1 days | End: 2018-05-09
Payer: COMMERCIAL

## 2018-05-09 VITALS
WEIGHT: 136.68 LBS | HEIGHT: 60.83 IN | TEMPERATURE: 98.6 F | BODY MASS INDEX: 25.81 KG/M2 | HEART RATE: 65 BPM | SYSTOLIC BLOOD PRESSURE: 170 MMHG | RESPIRATION RATE: 16 BRPM | OXYGEN SATURATION: 96 % | DIASTOLIC BLOOD PRESSURE: 72 MMHG

## 2018-05-09 DIAGNOSIS — M54.5 LOW BACK PAIN: ICD-10-CM

## 2018-05-09 DIAGNOSIS — I26.99 OTHER PULMONARY EMBOLISM WITHOUT ACUTE COR PULMONALE: ICD-10-CM

## 2018-05-09 DIAGNOSIS — Z83.3 FAMILY HISTORY OF DIABETES MELLITUS: ICD-10-CM

## 2018-05-09 DIAGNOSIS — Z90.49 ACQUIRED ABSENCE OF OTHER SPECIFIED PARTS OF DIGESTIVE TRACT: Chronic | ICD-10-CM

## 2018-05-09 DIAGNOSIS — J45.901 UNSPECIFIED ASTHMA WITH (ACUTE) EXACERBATION: ICD-10-CM

## 2018-05-09 DIAGNOSIS — Z86.39 PERSONAL HISTORY OF OTHER ENDOCRINE, NUTRITIONAL AND METABOLIC DISEASE: ICD-10-CM

## 2018-05-09 DIAGNOSIS — E04.1 NONTOXIC SINGLE THYROID NODULE: ICD-10-CM

## 2018-05-09 DIAGNOSIS — E78.00 PURE HYPERCHOLESTEROLEMIA, UNSPECIFIED: ICD-10-CM

## 2018-05-09 DIAGNOSIS — Z86.79 PERSONAL HISTORY OF OTHER DISEASES OF THE CIRCULATORY SYSTEM: ICD-10-CM

## 2018-05-09 LAB
HCT VFR BLD CALC: 38.5 % — SIGNIFICANT CHANGE UP (ref 34.5–45)
HGB BLD-MCNC: 13.5 G/DL — SIGNIFICANT CHANGE UP (ref 11.5–15.5)
MCHC RBC-ENTMCNC: 29.7 PG — SIGNIFICANT CHANGE UP (ref 27–34)
MCHC RBC-ENTMCNC: 35.2 G/DL — SIGNIFICANT CHANGE UP (ref 32–36)
MCV RBC AUTO: 84.5 FL — SIGNIFICANT CHANGE UP (ref 80–100)
PLATELET # BLD AUTO: 194 K/UL — SIGNIFICANT CHANGE UP (ref 150–400)
RBC # BLD: 4.55 M/UL — SIGNIFICANT CHANGE UP (ref 3.8–5.2)
RBC # FLD: 12 % — SIGNIFICANT CHANGE UP (ref 10.3–14.5)
WBC # BLD: 6.8 K/UL — SIGNIFICANT CHANGE UP (ref 3.8–10.5)
WBC # FLD AUTO: 6.8 K/UL — SIGNIFICANT CHANGE UP (ref 3.8–10.5)

## 2018-05-09 PROCEDURE — G0452: CPT | Mod: 26

## 2018-05-09 PROCEDURE — 81240 F2 GENE: CPT

## 2018-05-09 PROCEDURE — 81241 F5 GENE: CPT

## 2018-05-09 PROCEDURE — 99205 OFFICE O/P NEW HI 60 MIN: CPT

## 2018-05-09 RX ORDER — PRAVASTATIN SODIUM 10 MG/1
10 TABLET ORAL
Refills: 0 | Status: ACTIVE | COMMUNITY

## 2018-05-09 RX ORDER — ROSUVASTATIN CALCIUM 10 MG/1
10 TABLET, FILM COATED ORAL
Qty: 30 | Refills: 0 | Status: ACTIVE | COMMUNITY
Start: 2018-05-09

## 2018-05-09 RX ORDER — MECLIZINE HYDROCHLORIDE 12.5 MG/1
12.5 TABLET ORAL
Refills: 0 | Status: ACTIVE | COMMUNITY

## 2018-05-09 RX ORDER — OLMESARTAN MEDOXOMIL 40 MG/1
TABLET, FILM COATED ORAL
Refills: 0 | Status: ACTIVE | COMMUNITY

## 2018-05-09 RX ORDER — MONTELUKAST 10 MG/1
10 TABLET, FILM COATED ORAL DAILY
Qty: 30 | Refills: 2 | Status: ACTIVE | COMMUNITY
Start: 2018-05-09

## 2018-05-09 RX ORDER — ACETAMINOPHEN 325 MG/1
325 TABLET, FILM COATED ORAL
Refills: 0 | Status: ACTIVE | COMMUNITY

## 2018-05-09 RX ORDER — OLMESARTAN MEDOXOMIL, AMLODIPINE BESYLATE AND HYDROCHLOROTHIAZIDE 40; 10; 12.5 MG/1; MG/1; MG/1
40-5-25 TABLET, FILM COATED ORAL DAILY
Qty: 30 | Refills: 0 | Status: ACTIVE | COMMUNITY
Start: 2018-05-09

## 2018-05-09 RX ORDER — MONTELUKAST 10 MG/1
10 TABLET, FILM COATED ORAL
Refills: 0 | Status: ACTIVE | COMMUNITY

## 2018-05-14 LAB
DNA PLOIDY SPEC FC-IMP: SIGNIFICANT CHANGE UP
PTR INTERPRETATION: SIGNIFICANT CHANGE UP

## 2018-06-07 ENCOUNTER — FORM ENCOUNTER (OUTPATIENT)
Age: 83
End: 2018-06-07

## 2018-06-08 ENCOUNTER — APPOINTMENT (OUTPATIENT)
Dept: ULTRASOUND IMAGING | Facility: IMAGING CENTER | Age: 83
End: 2018-06-08
Payer: MEDICARE

## 2018-06-08 ENCOUNTER — OUTPATIENT (OUTPATIENT)
Dept: OUTPATIENT SERVICES | Facility: HOSPITAL | Age: 83
LOS: 1 days | End: 2018-06-08
Payer: COMMERCIAL

## 2018-06-08 DIAGNOSIS — K82.8 OTHER SPECIFIED DISEASES OF GALLBLADDER: ICD-10-CM

## 2018-06-08 DIAGNOSIS — Z90.49 ACQUIRED ABSENCE OF OTHER SPECIFIED PARTS OF DIGESTIVE TRACT: Chronic | ICD-10-CM

## 2018-06-08 PROCEDURE — 76700 US EXAM ABDOM COMPLETE: CPT

## 2018-06-08 PROCEDURE — 76700 US EXAM ABDOM COMPLETE: CPT | Mod: 26

## 2018-06-14 ENCOUNTER — EMERGENCY (EMERGENCY)
Facility: HOSPITAL | Age: 83
LOS: 1 days | Discharge: HOME | End: 2018-06-14
Attending: EMERGENCY MEDICINE
Payer: COMMERCIAL

## 2018-06-14 VITALS
OXYGEN SATURATION: 96 % | DIASTOLIC BLOOD PRESSURE: 71 MMHG | SYSTOLIC BLOOD PRESSURE: 151 MMHG | TEMPERATURE: 98 F | RESPIRATION RATE: 20 BRPM | HEART RATE: 62 BPM

## 2018-06-14 DIAGNOSIS — Z90.49 ACQUIRED ABSENCE OF OTHER SPECIFIED PARTS OF DIGESTIVE TRACT: Chronic | ICD-10-CM

## 2018-06-14 LAB — GAS PNL BLDV: SIGNIFICANT CHANGE UP

## 2018-06-14 PROCEDURE — 93010 ELECTROCARDIOGRAM REPORT: CPT

## 2018-06-14 PROCEDURE — 99284 EMERGENCY DEPT VISIT MOD MDM: CPT | Mod: 25

## 2018-06-14 RX ORDER — SODIUM CHLORIDE 9 MG/ML
1000 INJECTION INTRAMUSCULAR; INTRAVENOUS; SUBCUTANEOUS ONCE
Qty: 0 | Refills: 0 | Status: COMPLETED | OUTPATIENT
Start: 2018-06-14 | End: 2018-06-14

## 2018-06-14 RX ADMIN — SODIUM CHLORIDE 250 MILLILITER(S): 9 INJECTION INTRAMUSCULAR; INTRAVENOUS; SUBCUTANEOUS at 23:43

## 2018-06-14 NOTE — ED PROVIDER NOTE - NS ED MD DISPO DISCHARGE
Message    Please call mom and let her know that Amanda's blood work was all good  pt aware ems 11/30/2016         Verified Results  (1) CBC/PLT/DIFF [id number] 08:57AM [facility]     Test Name Result Flag Reference   WBC 7 4 x10E3/uL  3 7-10 5   RBC 5 04 x10E6/uL  3 91-5 45   Hemoglobin 14 4 g/dL  11 7-15 7   Hematocrit 41 2 %  34 8-45 8   MCV 82 fL  77-91   MCH 28 6 pg  25 7-31 5   MCHC 35 0 g/dL  31 7-36 0   RDW 13 2 %  12 3-15 1   Platelets 873 P19F9/NQ  176-407   Neutrophils 51 %     Lymphs 41 %     Monocytes 6 %     Eos 2 %     Basos 0 %     Neutrophils (Absolute) 3 7 x10E3/uL  1 2-6 0   Lymphs (Absolute) 3 1 x10E3/uL  1 3-3 7   Monocytes(Absolute) 0 5 x10E3/uL  0 1-0 8   Eos (Absolute) 0 2 x10E3/uL  0 0-0 4   Baso (Absolute) 0 0 x10E3/uL  0 0-0 3   Immature Granulocytes 0 %     Immature Grans (Abs) 0 0 x10E3/uL  0 0-0 1     (LC) CMP14+eGFR [id number] 08:57AM [facility]     Test Name Result Flag Reference   Glucose, Serum 109 mg/dL H 65-99   BUN 12 mg/dL  5-18   Creatinine, Serum 0 49 mg/dL  0 39-0 70   eGFR If NonAfricn Am UNABL1 mL/min/1 73     Unable to calculate GFR  Age and/or sex not provided or age <19 years  old  eGFR If Africn Am UNABL1 mL/min/1 73     Unable to calculate GFR  Age and/or sex not provided or age <19 years  old     BUN/Creatinine Ratio 24  9-25   Sodium, Serum 139 mmol/L  136-144   **Effective December 12, 2016 the reference interval**                   for Sodium, Serum will be changing to:                                                             134 - 144   Potassium, Serum 4 5 mmol/L  3 5-5 2   Chloride, Serum 100 mmol/L     **Effective December 12, 2016 the reference interval**                   for Chloride, Serum will be changing to:                                                              96 - 106   Carbon Dioxide, Total 23 mmol/L  17-27   Calcium, Serum 10 0 mg/dL  9 1-10 5   Protein, Total, Serum 7 0 g/dL  6 0-8 5   Albumin, Serum 4 6 g/dL  3 5-5 5 Globulin, Total 2 4 g/dL  1 5-4 5   A/G Ratio 1 9  1 1-2 5   Bilirubin, Total 0 2 mg/dL  0 0-1 2   Alkaline Phosphatase, S 166 IU/L  134-349   AST (SGOT) 18 IU/L  0-40   ALT (SGPT) 12 IU/L  0-28     (LC) TSH Rfx on Abnormal to Free T4 [id number] 08:57AM [facility]     Test Name Result Flag Reference   TSH 2 560 uIU/mL  0 450-4 500 Home

## 2018-06-14 NOTE — ED PROVIDER NOTE - PROGRESS NOTE DETAILS
Asymptomatic. Abdomen benign. CT negative. Not in need of any additional emergent diagnostic investigation or intervention at this time. Discharged in the care of family. Results given.

## 2018-06-14 NOTE — ED ADULT NURSE NOTE - OBJECTIVE STATEMENT
81 y/o F presents to the ED c/o abdominal pain.  PT [place] speaking, family at bedside translating.  Patient states about 1 hour prior to arrival she developed a sudden abd severe pain in her upper abdomen.  Pt states the pain felt like she was being "punched" in her abdomen.  Pt also endorses 1 episode of vomiting, non bilious no bloody.  Patient states the pain feels much better now and states the pain is mild, 3/10.  Pt states the pain made it harder for her to breathe, but denies difficulty breathing in ED.  Patient is A&Ox4. Face is symmetrical. PERRL 3mmB. Speech is clear. Patient is moving all extremities.  No chest pain, shortness of breath, diaphoresis, palpitations, left arm pain.  Abdomen is soft, nondistended, mildly tender in epigastric region.  VSS           81 y/o Frisian-speaking (translation provided by family member) female who presents with a cc of AP that began approx 30 minutes prior to arrival. It was sudden in onset, across her upper abdomen s/ radiation, "punching" in nature, had been more severe but is now mild, without clear relieving or exacerbating factor and associated with nausea and a sense that she was going to vomit. She denies any prior h/o the same.

## 2018-06-15 VITALS
SYSTOLIC BLOOD PRESSURE: 138 MMHG | HEART RATE: 65 BPM | TEMPERATURE: 98 F | OXYGEN SATURATION: 98 % | RESPIRATION RATE: 20 BRPM | DIASTOLIC BLOOD PRESSURE: 80 MMHG

## 2018-06-15 LAB
ALBUMIN SERPL ELPH-MCNC: 4.2 G/DL — SIGNIFICANT CHANGE UP (ref 3.3–5)
ALP SERPL-CCNC: 80 U/L — SIGNIFICANT CHANGE UP (ref 40–120)
ALT FLD-CCNC: 11 U/L — SIGNIFICANT CHANGE UP (ref 10–45)
ANION GAP SERPL CALC-SCNC: 11 MMOL/L — SIGNIFICANT CHANGE UP (ref 5–17)
APTT BLD: 31.2 SEC — SIGNIFICANT CHANGE UP (ref 27.5–37.4)
AST SERPL-CCNC: 14 U/L — SIGNIFICANT CHANGE UP (ref 10–40)
BASOPHILS # BLD AUTO: 0.1 K/UL — SIGNIFICANT CHANGE UP (ref 0–0.2)
BASOPHILS NFR BLD AUTO: 0.8 % — SIGNIFICANT CHANGE UP (ref 0–2)
BILIRUB SERPL-MCNC: 0.5 MG/DL — SIGNIFICANT CHANGE UP (ref 0.2–1.2)
BLD GP AB SCN SERPL QL: NEGATIVE — SIGNIFICANT CHANGE UP
BUN SERPL-MCNC: 16 MG/DL — SIGNIFICANT CHANGE UP (ref 7–23)
CALCIUM SERPL-MCNC: 8.7 MG/DL — SIGNIFICANT CHANGE UP (ref 8.4–10.5)
CHLORIDE SERPL-SCNC: 97 MMOL/L — SIGNIFICANT CHANGE UP (ref 96–108)
CO2 SERPL-SCNC: 27 MMOL/L — SIGNIFICANT CHANGE UP (ref 22–31)
CREAT SERPL-MCNC: 0.75 MG/DL — SIGNIFICANT CHANGE UP (ref 0.5–1.3)
EOSINOPHIL # BLD AUTO: 0.2 K/UL — SIGNIFICANT CHANGE UP (ref 0–0.5)
EOSINOPHIL NFR BLD AUTO: 2.4 % — SIGNIFICANT CHANGE UP (ref 0–6)
GLUCOSE SERPL-MCNC: 130 MG/DL — HIGH (ref 70–99)
HCT VFR BLD CALC: 35.9 % — SIGNIFICANT CHANGE UP (ref 34.5–45)
HGB BLD-MCNC: 12.4 G/DL — SIGNIFICANT CHANGE UP (ref 11.5–15.5)
INR BLD: 1.79 RATIO — HIGH (ref 0.88–1.16)
LIDOCAIN IGE QN: 43 U/L — SIGNIFICANT CHANGE UP (ref 7–60)
LYMPHOCYTES # BLD AUTO: 1.2 K/UL — SIGNIFICANT CHANGE UP (ref 1–3.3)
LYMPHOCYTES # BLD AUTO: 17.5 % — SIGNIFICANT CHANGE UP (ref 13–44)
MCHC RBC-ENTMCNC: 29.8 PG — SIGNIFICANT CHANGE UP (ref 27–34)
MCHC RBC-ENTMCNC: 34.5 GM/DL — SIGNIFICANT CHANGE UP (ref 32–36)
MCV RBC AUTO: 86.5 FL — SIGNIFICANT CHANGE UP (ref 80–100)
MONOCYTES # BLD AUTO: 0.7 K/UL — SIGNIFICANT CHANGE UP (ref 0–0.9)
MONOCYTES NFR BLD AUTO: 10.4 % — SIGNIFICANT CHANGE UP (ref 2–14)
NEUTROPHILS # BLD AUTO: 4.8 K/UL — SIGNIFICANT CHANGE UP (ref 1.8–7.4)
NEUTROPHILS NFR BLD AUTO: 68.9 % — SIGNIFICANT CHANGE UP (ref 43–77)
PLATELET # BLD AUTO: 199 K/UL — SIGNIFICANT CHANGE UP (ref 150–400)
POTASSIUM SERPL-MCNC: 3.6 MMOL/L — SIGNIFICANT CHANGE UP (ref 3.5–5.3)
POTASSIUM SERPL-SCNC: 3.6 MMOL/L — SIGNIFICANT CHANGE UP (ref 3.5–5.3)
PROT SERPL-MCNC: 7 G/DL — SIGNIFICANT CHANGE UP (ref 6–8.3)
PROTHROM AB SERPL-ACNC: 19.8 SEC — HIGH (ref 9.8–12.7)
RBC # BLD: 4.16 M/UL — SIGNIFICANT CHANGE UP (ref 3.8–5.2)
RBC # FLD: 12.6 % — SIGNIFICANT CHANGE UP (ref 10.3–14.5)
RH IG SCN BLD-IMP: POSITIVE — SIGNIFICANT CHANGE UP
SODIUM SERPL-SCNC: 135 MMOL/L — SIGNIFICANT CHANGE UP (ref 135–145)
WBC # BLD: 7 K/UL — SIGNIFICANT CHANGE UP (ref 3.8–10.5)
WBC # FLD AUTO: 7 K/UL — SIGNIFICANT CHANGE UP (ref 3.8–10.5)

## 2018-06-15 PROCEDURE — 84295 ASSAY OF SERUM SODIUM: CPT

## 2018-06-15 PROCEDURE — 82435 ASSAY OF BLOOD CHLORIDE: CPT

## 2018-06-15 PROCEDURE — 93005 ELECTROCARDIOGRAM TRACING: CPT

## 2018-06-15 PROCEDURE — 82330 ASSAY OF CALCIUM: CPT

## 2018-06-15 PROCEDURE — 86901 BLOOD TYPING SEROLOGIC RH(D): CPT

## 2018-06-15 PROCEDURE — 86900 BLOOD TYPING SEROLOGIC ABO: CPT

## 2018-06-15 PROCEDURE — 74177 CT ABD & PELVIS W/CONTRAST: CPT

## 2018-06-15 PROCEDURE — 85730 THROMBOPLASTIN TIME PARTIAL: CPT

## 2018-06-15 PROCEDURE — 83605 ASSAY OF LACTIC ACID: CPT

## 2018-06-15 PROCEDURE — 85027 COMPLETE CBC AUTOMATED: CPT

## 2018-06-15 PROCEDURE — 80053 COMPREHEN METABOLIC PANEL: CPT

## 2018-06-15 PROCEDURE — 74177 CT ABD & PELVIS W/CONTRAST: CPT | Mod: 26

## 2018-06-15 PROCEDURE — 83690 ASSAY OF LIPASE: CPT

## 2018-06-15 PROCEDURE — 85014 HEMATOCRIT: CPT

## 2018-06-15 PROCEDURE — 84132 ASSAY OF SERUM POTASSIUM: CPT

## 2018-06-15 PROCEDURE — 99284 EMERGENCY DEPT VISIT MOD MDM: CPT | Mod: 25

## 2018-06-15 PROCEDURE — 86850 RBC ANTIBODY SCREEN: CPT

## 2018-06-15 PROCEDURE — 82947 ASSAY GLUCOSE BLOOD QUANT: CPT

## 2018-06-15 PROCEDURE — 82803 BLOOD GASES ANY COMBINATION: CPT

## 2018-06-15 PROCEDURE — 85610 PROTHROMBIN TIME: CPT

## 2018-09-28 ENCOUNTER — RX RENEWAL (OUTPATIENT)
Age: 83
End: 2018-09-28

## 2019-01-12 ENCOUNTER — OUTPATIENT (OUTPATIENT)
Dept: OUTPATIENT SERVICES | Facility: HOSPITAL | Age: 84
LOS: 1 days | Discharge: HOME | End: 2019-01-12

## 2019-01-12 DIAGNOSIS — I26.99 OTHER PULMONARY EMBOLISM WITHOUT ACUTE COR PULMONALE: ICD-10-CM

## 2019-01-12 DIAGNOSIS — Z90.49 ACQUIRED ABSENCE OF OTHER SPECIFIED PARTS OF DIGESTIVE TRACT: Chronic | ICD-10-CM

## 2019-01-16 ENCOUNTER — APPOINTMENT (OUTPATIENT)
Dept: HEMATOLOGY ONCOLOGY | Facility: CLINIC | Age: 84
End: 2019-01-16
Payer: MEDICARE

## 2019-01-16 VITALS
RESPIRATION RATE: 16 BRPM | HEART RATE: 60 BPM | BODY MASS INDEX: 25.97 KG/M2 | DIASTOLIC BLOOD PRESSURE: 70 MMHG | OXYGEN SATURATION: 97 % | TEMPERATURE: 99.1 F | WEIGHT: 136.68 LBS | SYSTOLIC BLOOD PRESSURE: 156 MMHG

## 2019-01-16 PROCEDURE — 99213 OFFICE O/P EST LOW 20 MIN: CPT

## 2019-01-16 RX ORDER — AMOXICILLIN 500 MG/1
500 CAPSULE ORAL
Qty: 14 | Refills: 0 | Status: COMPLETED | COMMUNITY
Start: 2018-11-13

## 2019-01-16 RX ORDER — AMMONIUM LACTATE 12 %
12 CREAM (GRAM) TOPICAL
Qty: 385 | Refills: 0 | Status: COMPLETED | COMMUNITY
Start: 2018-11-20

## 2019-01-16 RX ORDER — PSYLLIUM HUSK 0.4 G
CAPSULE ORAL
Refills: 0 | Status: COMPLETED | COMMUNITY
End: 2019-01-16

## 2019-01-16 RX ORDER — OMEGA-3-ACID ETHYL ESTERS CAPSULES 1 G/1
1 CAPSULE, LIQUID FILLED ORAL
Qty: 60 | Refills: 0 | Status: ACTIVE | COMMUNITY
Start: 2018-05-02

## 2019-01-16 RX ORDER — IBUPROFEN 400 MG/1
400 TABLET, FILM COATED ORAL
Qty: 90 | Refills: 0 | Status: COMPLETED | COMMUNITY
Start: 2018-10-03

## 2019-01-16 RX ORDER — DOXEPIN HYDROCHLORIDE 50 MG/G
5 CREAM TOPICAL
Qty: 90 | Refills: 0 | Status: COMPLETED | COMMUNITY
Start: 2018-10-18

## 2019-01-16 RX ORDER — KETOCONAZOLE 20 MG/G
2 CREAM TOPICAL
Qty: 60 | Refills: 0 | Status: COMPLETED | COMMUNITY
Start: 2018-11-20

## 2019-01-16 RX ORDER — UBIDECARENONE/VIT E ACET 100MG-5
CAPSULE ORAL
Refills: 0 | Status: COMPLETED | COMMUNITY
End: 2019-01-16

## 2019-01-16 RX ORDER — APIXABAN 5 MG/1
5 TABLET, FILM COATED ORAL
Qty: 60 | Refills: 1 | Status: DISCONTINUED | COMMUNITY
Start: 2018-05-09 | End: 2019-01-16

## 2019-01-16 RX ORDER — OLMESARTAN MEDOXOMIL AND HYDROCHLOROTHIAZIDE 40; 12.5 MG/1; MG/1
40-12.5 TABLET ORAL
Qty: 30 | Refills: 0 | Status: ACTIVE | COMMUNITY
Start: 2018-08-03

## 2019-01-16 RX ORDER — BENZONATATE 100 MG/1
100 CAPSULE ORAL
Qty: 60 | Refills: 0 | Status: COMPLETED | COMMUNITY
Start: 2018-12-14

## 2019-01-16 RX ORDER — ERGOCALCIFEROL 1.25 MG/1
1.25 MG CAPSULE, LIQUID FILLED ORAL
Qty: 4 | Refills: 0 | Status: ACTIVE | COMMUNITY
Start: 2018-05-02

## 2019-01-16 RX ORDER — CHLORHEXIDINE GLUCONATE 4 %
1000 LIQUID (ML) TOPICAL
Qty: 30 | Refills: 0 | Status: ACTIVE | COMMUNITY
Start: 2018-12-14

## 2019-01-16 RX ORDER — CICLOPIROX OLAMINE 7.7 MG/G
0.77 CREAM TOPICAL
Qty: 90 | Refills: 0 | Status: COMPLETED | COMMUNITY
Start: 2018-09-20

## 2019-01-16 RX ORDER — OFLOXACIN OTIC 3 MG/ML
0.3 SOLUTION AURICULAR (OTIC)
Qty: 5 | Refills: 0 | Status: COMPLETED | COMMUNITY
Start: 2018-11-13

## 2019-01-16 NOTE — HISTORY OF PRESENT ILLNESS
[Disease:__________________________] : Disease: [unfilled] [Treatment Protocol] : Treatment Protocol [Cardiovascular] : Cardiovascular [Constitutional] : Constitutional [ENT] : ENT [Dermatologic] : Dermatologic [Endocrine] : Endocrine [Gastrointestinal] : Gastrointestinal [Genitourinary] : Genitourinary [Gynecologic] : Gynecologic [Infectious] : Infectious [Musculoskeletal] : Musculoskeletal [Neurologic] : Neurologic [Pain] : Pain [Pulmonary] : Pulmonary [Hematologic] : Hematologic [de-identified] : Alisa Urbina is a 83 year old female presenting to the office for hematologic evaluation. She is referred here by Dr. Prabhjot Valera (internist). She was not previously seen at [facility] sent to see us by primary care/ surgery office in [facility]. Prior CT scans suggest multiple pulmonary emboli. The patient was seen in the [facility] ER  04/23/2018 and was evaluated for pneumobilia with a dilated common bile duct; she had complained of abdominal pain and she was evaluated by surgery but no surgical intervention was planned. Incidental finding of bilateral lobar and segmental pulmonary emboli; the patient was  has been started on heparin anticoagulation and she was discharged on Eliquis 5 mg PO BID. She was discharged on 04/25/2018.She did not have have hemoptysis nor was there a history of dyspnea.\par She has no history of cancer or of prior blood clots. \par Past medical history is remarkable for back pain and disc disease. She has a history of hypercholesterolemia and hypertension. She was not routinely taking her medication for hypertension and hypercholesterolemia.  [FreeTextEntry1] : Eliquis 5 mg  [de-identified] : Patient presented to the office for routine follow-up visit. She completed her 6 months of oral anticoagulation use since her pulmonary embolism diagnosis. She admitted to experiencing intermittent dizziness while on Eliquis but it has improved since stopping that medication.

## 2019-01-16 NOTE — REASON FOR VISIT
[Follow-Up Visit] : a follow-up visit for [Coagulopathy] : coagulopathy [FreeTextEntry2] : bilateral pulmonary emboli

## 2019-01-16 NOTE — REVIEW OF SYSTEMS
[Fatigue] : fatigue [Loss of Hearing] : loss of hearing [Lower Ext Edema] : lower extremity edema [Dizziness] : dizziness [Difficulty Walking] : difficulty walking [Fever] : no fever [Chills] : no chills [Night Sweats] : no night sweats [Recent Change In Weight] : ~T no recent weight change [Eye Pain] : no eye pain [Red Eyes] : eyes not red [Dry Eyes] : no dryness of the eyes [Vision Problems] : no vision problems [Nosebleeds] : no nosebleeds [Hoarseness] : no hoarseness [Odynophagia] : no odynophagia [Mucosal Pain] : no mucosal pain [Chest Pain] : no chest pain [Palpitations] : no palpitations [Leg Claudication] : no intermittent leg claudication [Shortness Of Breath] : no shortness of breath [Wheezing] : no wheezing [Cough] : no cough [SOB on Exertion] : no shortness of breath during exertion [Abdominal Pain] : no abdominal pain [Vomiting] : no vomiting [Constipation] : no constipation [Diarrhea] : no diarrhea [Dysuria] : no dysuria [Incontinence] : no incontinence [Vaginal Discharge] : no vaginal discharge [Dysmenorrhea/Abn Vaginal Bleeding] : no dysmenorrhea/abnormal vaginal bleeding [Joint Pain] : no joint pain [Joint Stiffness] : no joint stiffness [Muscle Pain] : no muscle pain [Skin Rash] : no skin rash [Skin Wound] : no skin wound [Confused] : no confusion [Fainting] : no fainting [Insomnia] : no insomnia [Anxiety] : no anxiety [Depression] : no depression [Proptosis] : no proptosis [Hot Flashes] : no hot flashes [Muscle Weakness] : no muscle weakness [Deepening Of The Voice] : no deepening of the voice [Easy Bleeding] : no tendency for easy bleeding [Easy Bruising] : no tendency for easy bruising [Swollen Glands] : no swollen glands [FreeTextEntry2] : 4 lbs lost

## 2019-01-16 NOTE — PHYSICAL EXAM
[Ambulatory and capable of all self care but unable to carry out any work activities] : Status 2- Ambulatory and capable of all self care but unable to carry out any work activities. Up and about more than 50% of waking hours [Normal] : affect appropriate [de-identified] : no palpable right upper quadrant mass [de-identified] : no palpable supraclavicular lymph nodes

## 2019-01-16 NOTE — ASSESSMENT
[Supportive] : Goals of care discussed with patient: Supportive [Palliative Care Plan] : not applicable at this time [FreeTextEntry1] : Alisa Urbina is a 82 year old female recently diagnosed with bilateral pulmonary embolism, s/p 6 months of Eliquis. She currently feels fine and her physical examination findings were unremarkable. Her genetic blood studies were negative for Factor V Leiden and Prothrombin gene mutation. She had an abdominal ultrasound study done which noted unchanged focal adenomyomatosis of the gallbladder. Patient deferred from imaging studies at this time. Spoke to daughter (Pearl) who provided translation. Return to the office in 6 months. \par

## 2019-01-18 ENCOUNTER — APPOINTMENT (OUTPATIENT)
Dept: HEMATOLOGY ONCOLOGY | Facility: CLINIC | Age: 84
End: 2019-01-18

## 2019-03-16 ENCOUNTER — INPATIENT (INPATIENT)
Facility: HOSPITAL | Age: 84
LOS: 3 days | Discharge: HOME | DRG: 444 | End: 2019-03-20
Attending: INTERNAL MEDICINE | Admitting: INTERNAL MEDICINE
Payer: MEDICARE

## 2019-03-16 VITALS
HEART RATE: 60 BPM | SYSTOLIC BLOOD PRESSURE: 123 MMHG | DIASTOLIC BLOOD PRESSURE: 66 MMHG | TEMPERATURE: 97 F | RESPIRATION RATE: 18 BRPM | WEIGHT: 139.99 LBS | OXYGEN SATURATION: 98 %

## 2019-03-16 DIAGNOSIS — Z90.49 ACQUIRED ABSENCE OF OTHER SPECIFIED PARTS OF DIGESTIVE TRACT: Chronic | ICD-10-CM

## 2019-03-16 LAB
ALBUMIN SERPL ELPH-MCNC: 3.7 G/DL — SIGNIFICANT CHANGE UP (ref 3.5–5)
ALP SERPL-CCNC: 124 U/L — HIGH (ref 40–120)
ALT FLD-CCNC: 38 U/L DA — SIGNIFICANT CHANGE UP (ref 10–60)
ANION GAP SERPL CALC-SCNC: 6 MMOL/L — SIGNIFICANT CHANGE UP (ref 5–17)
APPEARANCE UR: CLEAR — SIGNIFICANT CHANGE UP
APTT BLD: 26.4 SEC — LOW (ref 27.5–36.3)
AST SERPL-CCNC: 75 U/L — HIGH (ref 10–40)
BASOPHILS # BLD AUTO: 0.05 K/UL — SIGNIFICANT CHANGE UP (ref 0–0.2)
BASOPHILS NFR BLD AUTO: 0.4 % — SIGNIFICANT CHANGE UP (ref 0–2)
BILIRUB SERPL-MCNC: 0.9 MG/DL — SIGNIFICANT CHANGE UP (ref 0.2–1.2)
BILIRUB UR-MCNC: NEGATIVE — SIGNIFICANT CHANGE UP
BUN SERPL-MCNC: 25 MG/DL — HIGH (ref 7–18)
CALCIUM SERPL-MCNC: 8.8 MG/DL — SIGNIFICANT CHANGE UP (ref 8.4–10.5)
CHLORIDE SERPL-SCNC: 104 MMOL/L — SIGNIFICANT CHANGE UP (ref 96–108)
CO2 SERPL-SCNC: 29 MMOL/L — SIGNIFICANT CHANGE UP (ref 22–31)
COLOR SPEC: YELLOW — SIGNIFICANT CHANGE UP
CREAT SERPL-MCNC: 1.1 MG/DL — SIGNIFICANT CHANGE UP (ref 0.5–1.3)
DIFF PNL FLD: ABNORMAL
EOSINOPHIL # BLD AUTO: 0.08 K/UL — SIGNIFICANT CHANGE UP (ref 0–0.5)
EOSINOPHIL NFR BLD AUTO: 0.7 % — SIGNIFICANT CHANGE UP (ref 0–6)
GLUCOSE SERPL-MCNC: 159 MG/DL — HIGH (ref 70–99)
GLUCOSE UR QL: NEGATIVE — SIGNIFICANT CHANGE UP
HCT VFR BLD CALC: 39.5 % — SIGNIFICANT CHANGE UP (ref 34.5–45)
HGB BLD-MCNC: 12.6 G/DL — SIGNIFICANT CHANGE UP (ref 11.5–15.5)
IMM GRANULOCYTES NFR BLD AUTO: 0.6 % — SIGNIFICANT CHANGE UP (ref 0–1.5)
INR BLD: 1.04 RATIO — SIGNIFICANT CHANGE UP (ref 0.88–1.16)
KETONES UR-MCNC: NEGATIVE — SIGNIFICANT CHANGE UP
LEUKOCYTE ESTERASE UR-ACNC: NEGATIVE — SIGNIFICANT CHANGE UP
LIDOCAIN IGE QN: 517 U/L — HIGH (ref 73–393)
LYMPHOCYTES # BLD AUTO: 0.93 K/UL — LOW (ref 1–3.3)
LYMPHOCYTES # BLD AUTO: 8 % — LOW (ref 13–44)
MCHC RBC-ENTMCNC: 28 PG — SIGNIFICANT CHANGE UP (ref 27–34)
MCHC RBC-ENTMCNC: 31.9 GM/DL — LOW (ref 32–36)
MCV RBC AUTO: 87.8 FL — SIGNIFICANT CHANGE UP (ref 80–100)
MONOCYTES # BLD AUTO: 0.54 K/UL — SIGNIFICANT CHANGE UP (ref 0–0.9)
MONOCYTES NFR BLD AUTO: 4.6 % — SIGNIFICANT CHANGE UP (ref 2–14)
NEUTROPHILS # BLD AUTO: 9.97 K/UL — HIGH (ref 1.8–7.4)
NEUTROPHILS NFR BLD AUTO: 85.7 % — HIGH (ref 43–77)
NITRITE UR-MCNC: NEGATIVE — SIGNIFICANT CHANGE UP
NRBC # BLD: 0 /100 WBCS — SIGNIFICANT CHANGE UP (ref 0–0)
PH UR: 6.5 — SIGNIFICANT CHANGE UP (ref 5–8)
PLATELET # BLD AUTO: 194 K/UL — SIGNIFICANT CHANGE UP (ref 150–400)
POTASSIUM SERPL-MCNC: 4.1 MMOL/L — SIGNIFICANT CHANGE UP (ref 3.5–5.3)
POTASSIUM SERPL-SCNC: 4.1 MMOL/L — SIGNIFICANT CHANGE UP (ref 3.5–5.3)
PROT SERPL-MCNC: 7.7 G/DL — SIGNIFICANT CHANGE UP (ref 6–8.3)
PROT UR-MCNC: NEGATIVE — SIGNIFICANT CHANGE UP
PROTHROM AB SERPL-ACNC: 11.6 SEC — SIGNIFICANT CHANGE UP (ref 10–12.9)
RBC # BLD: 4.5 M/UL — SIGNIFICANT CHANGE UP (ref 3.8–5.2)
RBC # FLD: 13.6 % — SIGNIFICANT CHANGE UP (ref 10.3–14.5)
SODIUM SERPL-SCNC: 139 MMOL/L — SIGNIFICANT CHANGE UP (ref 135–145)
SP GR SPEC: 1.01 — SIGNIFICANT CHANGE UP (ref 1.01–1.02)
UROBILINOGEN FLD QL: 4
WBC # BLD: 11.64 K/UL — HIGH (ref 3.8–10.5)
WBC # FLD AUTO: 11.64 K/UL — HIGH (ref 3.8–10.5)

## 2019-03-16 PROCEDURE — 93010 ELECTROCARDIOGRAM REPORT: CPT

## 2019-03-16 PROCEDURE — 74177 CT ABD & PELVIS W/CONTRAST: CPT | Mod: 26

## 2019-03-16 RX ORDER — MORPHINE SULFATE 50 MG/1
4 CAPSULE, EXTENDED RELEASE ORAL ONCE
Qty: 0 | Refills: 0 | Status: DISCONTINUED | OUTPATIENT
Start: 2019-03-16 | End: 2019-03-16

## 2019-03-16 RX ORDER — IOHEXOL 300 MG/ML
30 INJECTION, SOLUTION INTRAVENOUS ONCE
Qty: 0 | Refills: 0 | Status: COMPLETED | OUTPATIENT
Start: 2019-03-16 | End: 2019-03-16

## 2019-03-16 RX ORDER — SODIUM CHLORIDE 9 MG/ML
1000 INJECTION INTRAMUSCULAR; INTRAVENOUS; SUBCUTANEOUS ONCE
Qty: 0 | Refills: 0 | Status: COMPLETED | OUTPATIENT
Start: 2019-03-16 | End: 2019-03-16

## 2019-03-16 RX ORDER — ONDANSETRON 8 MG/1
4 TABLET, FILM COATED ORAL ONCE
Qty: 0 | Refills: 0 | Status: COMPLETED | OUTPATIENT
Start: 2019-03-16 | End: 2019-03-16

## 2019-03-16 RX ADMIN — MORPHINE SULFATE 4 MILLIGRAM(S): 50 CAPSULE, EXTENDED RELEASE ORAL at 20:44

## 2019-03-16 RX ADMIN — SODIUM CHLORIDE 1000 MILLILITER(S): 9 INJECTION INTRAMUSCULAR; INTRAVENOUS; SUBCUTANEOUS at 20:48

## 2019-03-16 RX ADMIN — ONDANSETRON 4 MILLIGRAM(S): 8 TABLET, FILM COATED ORAL at 20:44

## 2019-03-16 RX ADMIN — IOHEXOL 30 MILLILITER(S): 300 INJECTION, SOLUTION INTRAVENOUS at 20:48

## 2019-03-16 RX ADMIN — MORPHINE SULFATE 4 MILLIGRAM(S): 50 CAPSULE, EXTENDED RELEASE ORAL at 22:33

## 2019-03-16 NOTE — ED PROVIDER NOTE - OBJECTIVE STATEMENT
83 y.o w/ pmh of htn, no psh presenting with vomiting x 6 and epigastric pain starting today. denies diarrhea, dysuria, cp, sob. denies recent travel, sick contacct

## 2019-03-16 NOTE — ED PROVIDER NOTE - CLINICAL SUMMARY MEDICAL DECISION MAKING FREE TEXT BOX
Patient presenting with epigastric pain. vital stable. will give pain med, antiemetic, lab work. fluid hydration. ct abd given elderly age, concern for infection vs surgical abd

## 2019-03-17 DIAGNOSIS — Z29.9 ENCOUNTER FOR PROPHYLACTIC MEASURES, UNSPECIFIED: ICD-10-CM

## 2019-03-17 DIAGNOSIS — R10.9 UNSPECIFIED ABDOMINAL PAIN: ICD-10-CM

## 2019-03-17 DIAGNOSIS — I10 ESSENTIAL (PRIMARY) HYPERTENSION: ICD-10-CM

## 2019-03-17 DIAGNOSIS — K83.8 OTHER SPECIFIED DISEASES OF BILIARY TRACT: ICD-10-CM

## 2019-03-17 DIAGNOSIS — K85.90 ACUTE PANCREATITIS WITHOUT NECROSIS OR INFECTION, UNSPECIFIED: ICD-10-CM

## 2019-03-17 DIAGNOSIS — E78.5 HYPERLIPIDEMIA, UNSPECIFIED: ICD-10-CM

## 2019-03-17 LAB
ALBUMIN SERPL ELPH-MCNC: 3 G/DL — LOW (ref 3.5–5)
ALP SERPL-CCNC: 146 U/L — HIGH (ref 40–120)
ALT FLD-CCNC: 179 U/L DA — HIGH (ref 10–60)
ANION GAP SERPL CALC-SCNC: 5 MMOL/L — SIGNIFICANT CHANGE UP (ref 5–17)
AST SERPL-CCNC: 189 U/L — HIGH (ref 10–40)
BASOPHILS # BLD AUTO: 0.07 K/UL — SIGNIFICANT CHANGE UP (ref 0–0.2)
BASOPHILS NFR BLD AUTO: 0.4 % — SIGNIFICANT CHANGE UP (ref 0–2)
BILIRUB SERPL-MCNC: 1.9 MG/DL — HIGH (ref 0.2–1.2)
BUN SERPL-MCNC: 20 MG/DL — HIGH (ref 7–18)
CALCIUM SERPL-MCNC: 8.2 MG/DL — LOW (ref 8.4–10.5)
CHLORIDE SERPL-SCNC: 103 MMOL/L — SIGNIFICANT CHANGE UP (ref 96–108)
CHOLEST SERPL-MCNC: 103 MG/DL — SIGNIFICANT CHANGE UP (ref 10–199)
CO2 SERPL-SCNC: 29 MMOL/L — SIGNIFICANT CHANGE UP (ref 22–31)
CREAT SERPL-MCNC: 0.89 MG/DL — SIGNIFICANT CHANGE UP (ref 0.5–1.3)
EOSINOPHIL # BLD AUTO: 0.02 K/UL — SIGNIFICANT CHANGE UP (ref 0–0.5)
EOSINOPHIL NFR BLD AUTO: 0.1 % — SIGNIFICANT CHANGE UP (ref 0–6)
GLUCOSE SERPL-MCNC: 105 MG/DL — HIGH (ref 70–99)
HBA1C BLD-MCNC: 6 % — HIGH (ref 4–5.6)
HCT VFR BLD CALC: 33.6 % — LOW (ref 34.5–45)
HDLC SERPL-MCNC: 64 MG/DL — SIGNIFICANT CHANGE UP
HGB BLD-MCNC: 10.9 G/DL — LOW (ref 11.5–15.5)
IMM GRANULOCYTES NFR BLD AUTO: 0.7 % — SIGNIFICANT CHANGE UP (ref 0–1.5)
LDH SERPL L TO P-CCNC: 297 U/L — HIGH (ref 120–225)
LIDOCAIN IGE QN: 98 U/L — SIGNIFICANT CHANGE UP (ref 73–393)
LIPID PNL WITH DIRECT LDL SERPL: 24 MG/DL — SIGNIFICANT CHANGE UP
LYMPHOCYTES # BLD AUTO: 0.56 K/UL — LOW (ref 1–3.3)
LYMPHOCYTES # BLD AUTO: 2.8 % — LOW (ref 13–44)
MAGNESIUM SERPL-MCNC: 2 MG/DL — SIGNIFICANT CHANGE UP (ref 1.6–2.6)
MAGNESIUM SERPL-MCNC: 2.1 MG/DL — SIGNIFICANT CHANGE UP (ref 1.6–2.6)
MCHC RBC-ENTMCNC: 28.5 PG — SIGNIFICANT CHANGE UP (ref 27–34)
MCHC RBC-ENTMCNC: 32.4 GM/DL — SIGNIFICANT CHANGE UP (ref 32–36)
MCV RBC AUTO: 87.7 FL — SIGNIFICANT CHANGE UP (ref 80–100)
MONOCYTES # BLD AUTO: 0.95 K/UL — HIGH (ref 0–0.9)
MONOCYTES NFR BLD AUTO: 4.8 % — SIGNIFICANT CHANGE UP (ref 2–14)
NEUTROPHILS # BLD AUTO: 18.14 K/UL — HIGH (ref 1.8–7.4)
NEUTROPHILS NFR BLD AUTO: 91.2 % — HIGH (ref 43–77)
NRBC # BLD: 0 /100 WBCS — SIGNIFICANT CHANGE UP (ref 0–0)
PHOSPHATE SERPL-MCNC: 3 MG/DL — SIGNIFICANT CHANGE UP (ref 2.5–4.5)
PLATELET # BLD AUTO: 146 K/UL — LOW (ref 150–400)
POTASSIUM SERPL-MCNC: 3.7 MMOL/L — SIGNIFICANT CHANGE UP (ref 3.5–5.3)
POTASSIUM SERPL-SCNC: 3.7 MMOL/L — SIGNIFICANT CHANGE UP (ref 3.5–5.3)
PROCALCITONIN SERPL-MCNC: 16.5 NG/ML — HIGH (ref 0.02–0.1)
PROT SERPL-MCNC: 6.2 G/DL — SIGNIFICANT CHANGE UP (ref 6–8.3)
RBC # BLD: 3.83 M/UL — SIGNIFICANT CHANGE UP (ref 3.8–5.2)
RBC # FLD: 13.8 % — SIGNIFICANT CHANGE UP (ref 10.3–14.5)
SODIUM SERPL-SCNC: 137 MMOL/L — SIGNIFICANT CHANGE UP (ref 135–145)
TOTAL CHOLESTEROL/HDL RATIO MEASUREMENT: 1.6 RATIO — LOW (ref 3.3–7.1)
TRIGL SERPL-MCNC: 76 MG/DL — SIGNIFICANT CHANGE UP (ref 10–149)
WBC # BLD: 19.87 K/UL — HIGH (ref 3.8–10.5)
WBC # FLD AUTO: 19.87 K/UL — HIGH (ref 3.8–10.5)

## 2019-03-17 PROCEDURE — 74183 MRI ABD W/O CNTR FLWD CNTR: CPT | Mod: 26

## 2019-03-17 PROCEDURE — 99285 EMERGENCY DEPT VISIT HI MDM: CPT

## 2019-03-17 RX ORDER — ONDANSETRON 8 MG/1
4 TABLET, FILM COATED ORAL EVERY 6 HOURS
Qty: 0 | Refills: 0 | Status: DISCONTINUED | OUTPATIENT
Start: 2019-03-17 | End: 2019-03-20

## 2019-03-17 RX ORDER — SODIUM CHLORIDE 9 MG/ML
1000 INJECTION INTRAMUSCULAR; INTRAVENOUS; SUBCUTANEOUS
Qty: 0 | Refills: 0 | Status: DISCONTINUED | OUTPATIENT
Start: 2019-03-17 | End: 2019-03-17

## 2019-03-17 RX ORDER — LOSARTAN POTASSIUM 100 MG/1
100 TABLET, FILM COATED ORAL DAILY
Qty: 0 | Refills: 0 | Status: DISCONTINUED | OUTPATIENT
Start: 2019-03-17 | End: 2019-03-17

## 2019-03-17 RX ORDER — PIPERACILLIN AND TAZOBACTAM 4; .5 G/20ML; G/20ML
3.38 INJECTION, POWDER, LYOPHILIZED, FOR SOLUTION INTRAVENOUS EVERY 8 HOURS
Qty: 0 | Refills: 0 | Status: DISCONTINUED | OUTPATIENT
Start: 2019-03-17 | End: 2019-03-20

## 2019-03-17 RX ORDER — ACETAMINOPHEN 500 MG
650 TABLET ORAL EVERY 6 HOURS
Qty: 0 | Refills: 0 | Status: DISCONTINUED | OUTPATIENT
Start: 2019-03-17 | End: 2019-03-18

## 2019-03-17 RX ORDER — ENOXAPARIN SODIUM 100 MG/ML
40 INJECTION SUBCUTANEOUS DAILY
Qty: 0 | Refills: 0 | Status: DISCONTINUED | OUTPATIENT
Start: 2019-03-17 | End: 2019-03-20

## 2019-03-17 RX ORDER — PIPERACILLIN AND TAZOBACTAM 4; .5 G/20ML; G/20ML
3.38 INJECTION, POWDER, LYOPHILIZED, FOR SOLUTION INTRAVENOUS ONCE
Qty: 0 | Refills: 0 | Status: COMPLETED | OUTPATIENT
Start: 2019-03-17 | End: 2019-03-17

## 2019-03-17 RX ORDER — INFLUENZA VIRUS VACCINE 15; 15; 15; 15 UG/.5ML; UG/.5ML; UG/.5ML; UG/.5ML
0.5 SUSPENSION INTRAMUSCULAR ONCE
Qty: 0 | Refills: 0 | Status: COMPLETED | OUTPATIENT
Start: 2019-03-17 | End: 2019-03-17

## 2019-03-17 RX ORDER — SODIUM CHLORIDE 9 MG/ML
1000 INJECTION INTRAMUSCULAR; INTRAVENOUS; SUBCUTANEOUS
Qty: 0 | Refills: 0 | Status: DISCONTINUED | OUTPATIENT
Start: 2019-03-17 | End: 2019-03-18

## 2019-03-17 RX ORDER — ATORVASTATIN CALCIUM 80 MG/1
40 TABLET, FILM COATED ORAL AT BEDTIME
Qty: 0 | Refills: 0 | Status: DISCONTINUED | OUTPATIENT
Start: 2019-03-17 | End: 2019-03-18

## 2019-03-17 RX ADMIN — ENOXAPARIN SODIUM 40 MILLIGRAM(S): 100 INJECTION SUBCUTANEOUS at 11:51

## 2019-03-17 RX ADMIN — ONDANSETRON 4 MILLIGRAM(S): 8 TABLET, FILM COATED ORAL at 16:12

## 2019-03-17 RX ADMIN — ATORVASTATIN CALCIUM 40 MILLIGRAM(S): 80 TABLET, FILM COATED ORAL at 21:57

## 2019-03-17 RX ADMIN — SODIUM CHLORIDE 100 MILLILITER(S): 9 INJECTION INTRAMUSCULAR; INTRAVENOUS; SUBCUTANEOUS at 06:07

## 2019-03-17 RX ADMIN — Medication 650 MILLIGRAM(S): at 08:39

## 2019-03-17 RX ADMIN — LOSARTAN POTASSIUM 100 MILLIGRAM(S): 100 TABLET, FILM COATED ORAL at 06:07

## 2019-03-17 RX ADMIN — Medication 650 MILLIGRAM(S): at 15:10

## 2019-03-17 RX ADMIN — PIPERACILLIN AND TAZOBACTAM 200 GRAM(S): 4; .5 INJECTION, POWDER, LYOPHILIZED, FOR SOLUTION INTRAVENOUS at 18:47

## 2019-03-17 RX ADMIN — SODIUM CHLORIDE 75 MILLILITER(S): 9 INJECTION INTRAMUSCULAR; INTRAVENOUS; SUBCUTANEOUS at 18:47

## 2019-03-17 RX ADMIN — Medication 650 MILLIGRAM(S): at 09:39

## 2019-03-17 RX ADMIN — Medication 650 MILLIGRAM(S): at 16:10

## 2019-03-17 NOTE — CHART NOTE - NSCHARTNOTEFT_GEN_A_CORE
Patient was seen and examined at bedside. Labs were reviewed. Pt was found to have worsening LFT(AST/ALT, bilirubin), and leukocytosis on the lab. Lipase normalized. Temp 99.9F, BP/HR stable. Stable mentation since admission, per 2 daughters at bedside. MRCP preliminary reading shows 1.2cm distal CBD stone. Pt is not in acute distress, not jaundiced, not in laboured breathing, and abdomen is soft and nontender. BISAP score 2, [place] criteria at least 2 however LDH is not available. Discussed with attending Dr. Mccabe over the phone. We will start zosyn for concern for developing cholangitis and will monitor the patient closely. Will keep her NPO. GI Dr. Garza was also informed about MRCP result. Pt will need ERCP and stone extraction as soon as possible. ID Dr. Harmon was also consulted. Patient was seen and examined at bedside -  for follow up. Labs were reviewed. Pt was found to have worsening LFT(AST/ALT, bilirubin), and leukocytosis on the lab. Lipase normalized. Temp 99.9F, BP/HR stable. Stable mentation since admission, per 2 daughters at bedside. MRCP preliminary reading shows 1.2cm distal CBD stone. Pt is not in acute distress, not jaundiced, not in laboured breathing, and abdomen is soft and nontender. BISAP score 2, [place] criteria at least 2 however LDH is not available. Discussed with attending Dr. Mccabe over the phone. We will start zosyn for concern for developing cholangitis and will monitor the patient closely. Will keep her NPO. GI Dr. Garza was also informed about MRCP result. Pt will need ERCP and stone extraction as soon as possible. ID Dr. Harmon was also consulted. Patient was seen and examined at bedside -  for follow up. Labs were reviewed. Pt was found to have worsening LFT(AST/ALT, bilirubin), and leukocytosis on the lab. Lipase normalized. Temp 99.9F, BP/HR stable. Stable mentation since admission, per 2 daughters at bedside. MRCP preliminary reading shows 1.2cm distal CBD stone. Pt is not in acute distress, not jaundiced, not in laboured breathing, and abdomen is soft and nontender. BISAP score 2, [place] criteria at least 2 however LDH is not available. Discussed with attending Dr. Mccabe over the phone. Pt will eventually need surgical evaluation for elective cholecystectomy, but no need for acute surgical consult at this point per attending. Will start zosyn for concern for developing cholangitis and will monitor the patient closely. Will keep her NPO. GI Dr. Garza was also informed about prelim MRCP result. Pt will need ERCP and stone extraction by gastroenterologist. ID Dr. Harmon was also consulted. 2 Daughters at beside were made aware of current plan. Patient was seen and examined at bedside -  for follow up. Labs were reviewed. Pt was found to have worsening LFT(AST/ALT, bilirubin), and leukocytosis on the lab. Lipase normalized. Temp 99.9F, BP/HR stable. Stable mentation since admission, per 2 daughters at bedside. MRCP preliminary reading shows 1.2cm distal CBD stone. Pt is not in acute distress, not jaundiced, not in laboured breathing, and abdomen is soft and nontender. BISAP score 2, [place] criteria at least 2 however LDH is not available. Discussed with attending Dr. Mccabe over the phone. Pt will eventually need surgical evaluation for elective cholecystectomy, but no need for acute surgical consult at this point per attending. Will start zosyn for concern for developing cholangitis and will monitor the patient closely. Will keep her NPO. Will hold Losartan given borderline blood pressure, and continue maintenance fluid. GI Dr. Garza was also informed about prelim MRCP result. Pt will need ERCP and stone extraction by gastroenterologist. ID Dr. Harmon was also consulted. 2 Daughters at beside were made aware of current plan.

## 2019-03-17 NOTE — PATIENT PROFILE ADULT - HAVE YOU EXPERIENCED VIOLENCE OR A TRAUMATIC EVENT?
RUSSELL MONTENEGRO contacted Dorothy to check on her. She reported that her Nurse friend Lei in [place] went through all my medicines and told her to stop Chlorpromazine and Geodon \"because these medications would kill me, he told me I could have a stroke and die\" So I stopped them the day after I saw Dr. Chaudhary. She reports she is having \"really bad nightmares\" Can't get thoughts out of her mind.   RN review medication as she is currently taking: She takes Zolpidem 2 at night. One Trazodone 50mg. (not 250mg) one 80mg Geodon. She restarted one 80mg last night (even though her friend told her to stop ) . She takes one Bupropion 150mg one in the am  (not two \"because of what Lei said\") She takes one Clonazepam (stopped taking it four times daily). Duloxetine 60mg daily. Not taking Benadryl.   She reports, \" I am too scared to take all this medication. \"My friend says I am on too many medications.\" She sobbed through entire telephone encounter.  She reports she saw a dog outside. She then  thought the dog was lost. she then drove around for two hours  looking for the dog. Now she cannot stop thinking about the dog.  \"This worry has given me a bad headache.\"  Encouraged pt to take Clonazepam now. (she has only taken one today order is QID)  [phone]   no

## 2019-03-17 NOTE — H&P ADULT - ASSESSMENT
Patient is a 83 female from home, lives with family, with PMHx of HTN, HLD and PE ( completed 1 year of Eliquis) presented to ED with c/o of nausea and vomiting since 1 day. history obtained from daughter at bed side. Patient complained of nausea and multiple episodes ( 6-7) NBNB vomiting since yesterday. patient also complains of RUQ and epigastric pain, 5/10 intensity, radiating to back along with chills. Patient denies fever, headaches, jaundice, chest pain, SOB, palpitation, diarrhea, constipation, dysuria, skin rashes, muscle or joint pains.     on admission pt was afebrile, HD stable, mild leucocytosis 11k, normal electrolytes, normal cr  - Lipase 517, Alk phos 127, ALT 38, AST 78  - UA neg for infection   - CT abd showed  Mild central intrahepatic biliary ductal dilatation

## 2019-03-17 NOTE — CONSULT NOTE ADULT - ASSESSMENT
Patient is a 83 female from home, lives with family, with PMHx of HTN, HLD and PE ( completed 1 year of Eliquis) presented to ED with c/o of nausea and vomiting since 1 day. history obtained from daughter at bed side. Patient complained of nausea and multiple episodes ( 6-7) NBNB vomiting since yesterday. patient also complains of RUQ and epigastric pain, 5/10 intensity, radiating to back along with chills.  Hospital work up suggested Pancreatitis and dilated, and obsctructed CBD.  Patient was admitted and was started on IV Zosyn.    PROBLEMS AND PLAN:  # ACUTE PANCREATITIS, POSSIBLE CHOLECYSTITIS.  1- UA & CS.  2- Follow Blood culture to final report.  3- GI management and follow up.  4- Continue IV Zosyn.  5- Fluid and electrolytes management.  6- CBC and CMP follow up.     # HTN.  1- Monitor Blood pressure closely.  2- Blood pressure control.  3- BP. meds as per cardiology and primary care team.
The etiology for abdominal pain most likely due to:  1. Pancreatitis   2. Fecal impaction  3. Biliary obstruction less likely    Suggestions:    1. Clear liquid diet  2. MRCP  3. Fleet enema x 2 half hour apart  4. Citrate magnesium on bottle (8Oz) po half hour after second enema  5. Follow up Lipase and LFT's  6. Avoid NSAID  7. DVT prophylaxis

## 2019-03-17 NOTE — CONSULT NOTE ADULT - SUBJECTIVE AND OBJECTIVE BOX
HPI:  Patient is a 83 female from home, lives with family, with PMHx of HTN, HLD and PE ( completed 1 year of Eliquis) presented to ED with c/o of nausea and vomiting since 1 day. history obtained from daughter at bed side. Patient complained of nausea and multiple episodes ( 6-7) NBNB vomiting since yesterday. patient also complains of RUQ and epigastric pain, 5/10 intensity, radiating to back along with chills. Patient denies fever, headaches, jaundice, chest pain, SOB, palpitation, diarrhea, constipation, dysuria, skin rashes, muscle or joint pains. (17 Mar 2019 05:10)      PAST MEDICAL & SURGICAL HISTORY:  Pulmonary emboli  GERD (gastroesophageal reflux disease)  HTN (hypertension)  S/P appendectomy      No Known Drug Allergies  Seafood (Hives)      acetaminophen   Tablet .. 650 milliGRAM(s) Oral every 6 hours PRN  atorvastatin 40 milliGRAM(s) Oral at bedtime  enoxaparin Injectable 40 milliGRAM(s) SubCutaneous daily  ondansetron Injectable 4 milliGRAM(s) IV Push every 6 hours PRN  piperacillin/tazobactam IVPB. 3.375 Gram(s) IV Intermittent every 8 hours  sodium chloride 0.9%. 1000 milliLiter(s) IV Continuous <Continuous>      Social Hx:    FAMILY HISTORY:  Family history of diabetes mellitus: Mother         ROS  [  ] UNABLE TO ELICIT    General:  [  ] None  [  ] Fever  [ x ] Chills  [ x ] Malaise    Skin:  [ x ] None [  ] Rash  [  ] Wound  [  ] Ulcer    HEENT:  [ x ] None  [  ] Sore Throat  [  ] Nasal congestion/ runny nose  [  ] Photophobia [  ] Neck pain      Chest:  [ x ] None   [  ] SOB  [  ] Cough  [  ] None    Cardiovascular:   [ x ] None  [  ] CP  [  ] Palpitation    Gastrointestinal:  [  ] None  [ x ] Abd pain   [ x ] Nausea    [ x ] Vomiting   [  ] Diarrhea	     Genitourinary:  [ x ] None [  ] Polyuria   [  ] Urgency  [  ] Frequency  [  ] Dysuria    [  ]  Hematuria       Musculoskeletal:  [  ] None [  ] Back Pain	[  ] Body aches  [  ] Joint pain  [ x ] Weakness    Neurological: [  ] None [  ]Dizziness  [  ]Visual Disturbance  [  ]Headaches   [ x ] Weakness      PHYSICAL EXAM:    Vital Signs Last 24 Hrs  T(C): 37.7 (17 Mar 2019 14:05), Max: 37.7 (17 Mar 2019 14:05)  T(F): 99.9 (17 Mar 2019 14:05), Max: 99.9 (17 Mar 2019 14:05)  HR: 64 (17 Mar 2019 15:07) (64 - 87)  BP: 111/49 (17 Mar 2019 15:07) (94/45 - 135/56)  BP(mean): --  RR: 18 (17 Mar 2019 14:05) (16 - 18)  SpO2: 95% (17 Mar 2019 14:05) (92% - 98%)    Constitutional:    HEENT: [ x ] Wnl  [  ] Pharyngeal congestion    Neck:  [ x ] Supple  [  ]Lymphadenopathy  [ x ] No JVD   [  ] JVD  [  ] Masses   [  ] WNL    CHEST/Respiratory:  [ x ]Clear to auscultation  [  ] Rales   [  ] Rhonchi   [  ] Wheezing     [  ] Chest Tenderness      Cardiovascular:  [ x ] Reg S1 S2   [  ] Irreg S1 S2   [ x ]No Murmur  [  ] +ve Murmurs  [  ]Systolic [  ]Diastolic      Abdomen:  [ x ] Soft  [ x ] No tendrerness  [  ] Tenderness  [  ] Organomegaly  [  ] ABD Distention  [  ] Rigidity                       [ x ] No Regidity                       [ x ] No Rebound Tenderness  [ x ] No Guarding Rigidity  [  ] Rebound Tenderness[  ] Guarding Rigidity                          [ x ]  +ve Bowel Sounds  [  ] Decreased Bowel Sounds    [  ] Absent Bowel Sounds                            Extremities: [ x ] No edema [  ] Edema  [  ] Clubbing   [  ] Cyanosis                         [ x ] No Tender Calf muscles  [  ] Tender Calf muscles                        [ x ] Palpable peripheral pulses    Neurological: [ x ] Awake  [ x ] Alert  [ x ] Oriented  x  3                           [  ] Confused  [  ] Drowsy  [  ] respond to painful stimuli  [  ] Unresponsive    Skin:  [ x ] Intact [  ] Redness [  ] Thrombophlebitis  [  ] Rashes  [  ] Dry  [  ] Ulcers    Ortho:  [  ] Joint Swelling  [  ] Joint erythema [  ] Joint tenderness                [  ] Increased temp. to touch  [  ] DJD [ x ] WNL      LABS/DIAGNOSTIC TESTS                          10.9   19.87 )-----------( 146      ( 17 Mar 2019 12:47 )             33.6     WBC Count: 19.87 K/uL ( @ 12:47)  WBC Count: 11.64 K/uL ( @ 20:37)          137  |  103  |  20<H>  ----------------------------<  105<H>  3.7   |  29  |  0.89    Ca    8.2<L>      17 Mar 2019 12:47  Phos  3.0       Mg     2.1         TPro  6.2  /  Alb  3.0<L>  /  TBili  1.9<H>  /  DBili  x   /  AST  189<H>  /  ALT  179<H>  /  AlkPhos  146<H>        Urinalysis Basic - ( 16 Mar 2019 23:32 )    Color: Yellow / Appearance: Clear / S.015 / pH: x  Gluc: x / Ketone: Negative  / Bili: Negative / Urobili: 4   Blood: x / Protein: Negative / Nitrite: Negative   Leuk Esterase: Negative / RBC: 0-2 /HPF / WBC 0-2 /HPF   Sq Epi: x / Non Sq Epi: Few /HPF / Bacteria: Trace /HPF        LIVER FUNCTIONS - ( 17 Mar 2019 12:47 )  Alb: 3.0 g/dL / Pro: 6.2 g/dL / ALK PHOS: 146 U/L / ALT: 179 U/L DA / AST: 189 U/L / GGT: x             PT/INR - ( 16 Mar 2019 20:37 )   PT: 11.6 sec;   INR: 1.04 ratio         PTT - ( 16 Mar 2019 20:37 )  PTT:26.4 sec    LACTATE:      CULTURES:   None yet.    RADIOLOGY    < from: MR MRCP w/wo IV Cont (19 @ 10:28) >  ******PRELIMINARY REPORT******    ******PRELIMINARY REPORT******          EXAM:  MR MRCP WAW IC                            PROCEDURE DATE:  2019    ******PRELIMINARY REPORT******    ******PRELIMINARY REPORT******              INTERPRETATION:[facility] RADIOLOGIST PRELIMINARY REPORT    EXAM:    MR Abdomen Without and With Contrast     EXAM DATE/TIME:    3/17/2019 9:29 AM     CLINICAL HISTORY:    83 years old, female; Pain; Abdominal pain; Additional info: Incomplete   exam,   PT was not able to tolerate, best possible images. No contrast given     TECHNIQUE:    MR of the abdomen without and with intravenous contrast.    MIP reconstructed images were created and reviewed.     COMPARISON:    CT ABDOMEN AND PELVIS WITH ORAL CONTRAST WITH IV CONTRAST 3/16/2019   11:39 PM     FINDINGS:    Lungs:  There is mild stranding at the lung bases, most likely   atelectasis.      Liver: No mass.    Gallbladder and bile ducts:  There is a 12 mm stone in the distal common   bile   duct. The stone is best seen on series 4, image 18. There is mild biliary   ductal dilatation with the extrahepatic common bile duct measuring 11 mm.   The   gallbladder is unremarkable.    Pancreas:  The pancreas is again noted to be atrophic. No acute changes   or   focal lesions within the pancreas.    Spleen: Unremarkable. No splenomegaly.    Adrenals: Unremarkable. No mass.    Kidneys and ureters:  Again noted is a 3.2 cm left renal cortical cyst.   There   are other smaller renal cysts bilaterally. No stones or hydronephrosis.    Stomach and bowel:  There is thickening of the wall of the distal   stomach and   proximal duodenum.    Intraperitoneal space: No fluid collection.    Soft tissues: Unremarkable.     IMPRESSION:   1. 12 mm stone in the distal commonbile duct with ductal dilatation.   2. Thickening of the wall of the distal stomach and proximal duodenum is   most   likely peptic ulcer disease or other infectious duodenitis. The sequela   of   long-term NSAID use is also possible. No evidence of perforation.                EXAM:  CT ABDOMEN AND PELVIS OC IC                            PROCEDURE DATE:  2019          INTERPRETATION:  CLINICAL INFORMATION:  Epigastric abdominal abdominal   pain and vomiting.  Emesis x 6.    TECHNIQUE:   Axial CT images were acquired through the abdomen and pelvis.  Intravenous contrast:  90 cc of Omnipaque-350 mg/ml were administered,   and 10 cc were discarded.  Oral contrast:  Positive oral contrast was administered.  Coronal and sagittal reformats were generated.     COMPARISON STUDY:  06/15/2018    FINDINGS:  LOWER CHEST: Small hiatal hernia with contrast opacification of the   distal esophagus, suggesting gastroesophageal reflux.    LIVER: Within normal limits.  BILE DUCTS: Pneumobilia seen on 06/15/2018 is no longer present. Mild   central intrahepatic biliary ductal dilatation that appears mildly   progressed since 06/15/2018.  Progressive dilatation of the common bile   duct, now measures 1 cm (602:45), increased from 8 mm on 06/15/2018.    Vague hyperattenuating material in the distal common bile duct at the   level of pancreatic head (602:47).  GALLBLADDER: Contracted.  Hyperattenuating material opacifying the   gallbladder lumen may represent reflux of oral contrast material from the   duodenum.  SPLEEN: A nonspecific 3 mm hypodense focus in the spleen (2:24) is stable   from 06/15/2018  PANCREAS: Moderate atrophy and fatty replacement.  No peripancreatic   edema or fluid collection. Main pancreatic duct is normal in caliber.   ADRENAL GLANDS: Within normal limits.  KIDNEYS/URETERS: The kidney enhance symmetrically without hydronephrosis   or renal stones.  A 3.2 cm left midpole renal cyst is stable.  A 1.2 cm   left lower pole lesion with indeterminate CT density (2:51) is stable.    BLADDER: Within normal limits.  REPRODUCTIVE ORGANS: Uterus and adnexa appear within normal limits.      BOWEL: No bowel obstruction.  Appendix not visualized; no secondary signs   of appendicitis.  Moderate amount of stool in the colon.  PERITONEUM: No ascites, free air or abscess.    RETROPERITONEUM: No retroperitoneal hemorrhage.  No lymphadenopathy.  VASCULATURE: Atherosclerotic calcifications of the aortoiliac tree.    ABDOMINAL WALL/SOFT TISSUES: Within normal limits.  BONES: Mild degenerative changes in the spine.  Mild bilateral hip   osteoarthrosis.      IMPRESSION:  Small hiatal hernia.  Contrast opacification of the distal esophagus,   suggesting gastroesophageal reflux.  No evidence of bowel obstruction.    Contracted gallbladder.  High attenuation material in the gallbladder   lumen may reflect reflux of oral contrast from the duodenum.    Intrahepatic and extrahepatic biliary ductal dilatation appears mildly   progressed from 06/15/2018.  Vague hyperdense material in the distal   common bile duct that may represent choledocholithiasis, sludge or reflux   of bowel contents through an incompetent sphincter of Oddi.  MRI/MRCP can   be performed for further characterization.      A 1.2 cm indeterminant left lower pole renal lesion is stable in size   from 06/15/2018.  This most likely represents a complex cyst.  MRI   characterization can be performed to exclude a suspicious lesion.

## 2019-03-17 NOTE — H&P ADULT - NSICDXPROBLEM_GEN_ALL_CORE_FT
PROBLEM DIAGNOSES  Problem: Pancreatitis, acute  Assessment and Plan: patient came with nasuea , vomiting and epigastric pain   on admission pt was afebrile, HD stable, mild leucocytosis 11k, normal electrolytes, normal cr  - Lipase 517, Alk phos 127, ALT 38, AST 78, normal Bili  - UA neg for infection   - CT abd showed  Mild central intrahepatic biliary ductal dilatation  - Jose D MILD pancreatitis 2/2 CB stone Vs biliary sludge   - leucoytosis likely reactive  - continue with gentle Hydration   - clear diet  -pain control   - pt will need MRCP/ERCP  - GI consult Dr Garza    Problem: Dilated cbd, acquired  Assessment and Plan: CT abd showed CBD 8mm  - likely CBD stone vs biliary sludge   - f/u MRCP   - mornal bili and mildy elevaed LFT's     Problem: HTN (hypertension)  Assessment and Plan: cotninue with home meds     Problem: HLD (hyperlipidemia)  Assessment and Plan: f/u Lipid panel   continue with home meds     Problem: Prophylactic measure  Assessment and Plan: lovenox for Dvt prophylaxis

## 2019-03-17 NOTE — H&P ADULT - ATTENDING COMMENTS
Patient is a 83 female from home, lives with family, with PMHx of hearing impaired, HTN, HLD and PE ( completed 1 year of Eliquis) presented to ED with c/o of nausea and vomiting since 1 day. history obtained from daughter at bed side. Patient complained of nausea and multiple episodes ( 6-7) NBNB vomiting since yesterday. patient also complains of RUQ and epigastric pain, 5/10 intensity, radiating to back along with chills. Patient denies fever, headaches, jaundice, chest pain, SOB, palpitation, diarrhea, constipation, dysuria, skin rashes, muscle or joint pains.     pt seen in bed, vitals stable except for low temp 99.5, physical exam reveals lungs cta b/l, heart s1s2, abd soft nd nt bs+, ext no edema. labs and diagnostic test result reviewed.    assessment  --  acute pancreatitis 2nd to passing gallstone vs sludge, choledocholithiasis vs sludge, gerd, h/o hearing impaired, HTN, HLD and PE    plan  --  admit to meds, cont preadmit home meds, gi and dvt profilaxis, ivf  cbc, bmp, mg, phos, lipids, tsh, lipase, lft,     start clear liquids as pt is now asymptomatic    mrcp    gi cons

## 2019-03-17 NOTE — CONSULT NOTE ADULT - SUBJECTIVE AND OBJECTIVE BOX
[  ] STAT REQUEST              [ X ]ROUTINE REQUEST    Patient is a 83 year old female with abdominal pain and vomiting. GI consulted to evaluate.      HPI:  Patient is a 83 female with multiple medical problems presented with one day h/o abdominal pain and vomiting associated with chills. Patient c/o constipation but denies, hematemesis, hematochezia, melena, fever, palpitation, cough, epistaxis, hemoptysis, hematuria, dysuria or diarrhea.         PAIN MANAGEMENT:  Pain Scale:                 3-4/10  Pain Location:      Prior Colonoscopy:  RUQ abdominal pain     PAST MEDICAL HISTORY  PE  GERD    HTN    HLD      PAST SURGICAL HISTORY  S/P appendectomy  No significant past surgical history      Allergies    No Known Drug Allergies  Seafood (Hives)          MEDICATIONS  (STANDING):  atorvastatin 40 milliGRAM(s) Oral at bedtime  enoxaparin Injectable 40 milliGRAM(s) SubCutaneous daily  losartan 100 milliGRAM(s) Oral daily  sodium chloride 0.9%. 1000 milliLiter(s) (100 mL/Hr) IV Continuous <Continuous>    MEDICATIONS  (PRN):  acetaminophen   Tablet .. 650 milliGRAM(s) Oral every 6 hours PRN Mild Pain (1 - 3), Moderate Pain (4 - 6)      SOCIAL HISTORY  Advanced Directives:       [ X ] Full Code       [  ] DNR  Marital Status:         [  ] M      [ X ] S      [  ] D       [  ] W  Children:       [X ] Yes      [  ] No  Occupation:        [  ] Employed       [ X ] Unemployed       [  ] Retired  Diet:       [ X ] Regular       [  ] PEG feeding          [  ] NG tube feeding  Drug Use:           [ X ] Patient denied          [  ] Yes  Alcohol:           [ X ] No             [  ] Yes (socially)         [  ] Yes (chronic)  Tobacco:           [  ] Yes           [ X ] No        FAMILY HISTORY  [ X ] Heart Disease            [  ] Diabetes             [  ] HTN             [  ] Colon Cancer             [  ] Stomach Cancer              [  ] Pancreatic Cancer          VITAL SIGNS   Vital Signs Last 24 Hrs  T(C): 37.5 (17 Mar 2019 04:16), Max: 37.5 (17 Mar 2019 04:16)  T(F): 99.5 (17 Mar 2019 04:16), Max: 99.5 (17 Mar 2019 04:16)  HR: 80 (17 Mar 2019 06:00) (60 - 87)  BP: 111/49 (17 Mar 2019 06:00) (106/47 - 135/56)   RR: 18 (17 Mar 2019 04:16) (16 - 18)  SpO2: 92% (17 Mar 2019 04:16) (92% - 98%)                CBC Full  -  ( 16 Mar 2019 20:37 )  WBC Count : 11.64 K/uL  Hemoglobin : 12.6 g/dL  Hematocrit : 39.5 %  Platelet Count - Automated : 194 K/uL  Mean Cell Volume : 87.8 fl  Mean Cell Hemoglobin : 28.0 pg  Mean Cell Hemoglobin Concentration : 31.9 gm/dL  Auto Neutrophil # : 9.97 K/uL  Auto Lymphocyte # : 0.93 K/uL  Auto Monocyte # : 0.54 K/uL  Auto Eosinophil # : 0.08 K/uL  Auto Basophil # : 0.05 K/uL  Auto Neutrophil % : 85.7 %  Auto Lymphocyte % : 8.0 %  Auto Monocyte % : 4.6 %  Auto Eosinophil % : 0.7 %  Auto Basophil % : 0.4 %      03-16    139  |  104  |  25<H>  ----------------------------<  159<H>  4.1   |  29  |  1.10    Ca    8.8      16 Mar 2019 20:37    TPro  7.7  /  Alb  3.7  /  TBili  0.9  /  DBili  x   /  AST  75<H>  /  ALT  38  /  AlkPhos  124<H>  03-16    Lipase, Serum: 517 U/L (03-16 @ 20:37)    PT/INR - ( 16 Mar 2019 20:37 )   PT: 11.6 sec;   INR: 1.04 ratio     PTT - ( 16 Mar 2019 20:37 )  PTT:26.4 sec    Urinalysis (03.16.19 @ 23:32)    pH Urine: 6.5    Glucose Qualitative, Urine: Negative    Blood, Urine: Trace    Color: Yellow    Urine Appearance: Clear    Bilirubin: Negative    Ketone - Urine: Negative    Specific Gravity: 1.015    Protein, Urine: Negative    Urobilinogen: 4    Nitrite: Negative    Leukocyte Esterase Concentration: Negative    ECG  Ventricular Rate 66 BPM    Atrial Rate 66 BPM    P-R Interval 168 ms    QRS Duration 98 ms     ms    QTc 450 ms    P Axis 31 degrees    R Axis 32 degrees    T Axis 27 degrees    Diagnosis Line NORMAL SINUS RHYTHM  RSR' OR QR PATTERN IN V1 SUGGESTS RIGHT VENTRICULAR CONDUCTION DELAY    RADIOLOGY/IMAGING       EXAM:  CT ABDOMEN AND PELVIS OC IC                            PROCEDURE DATE:  03/16/2019          INTERPRETATION:  CLINICAL INFORMATION:  Epigastric abdominal abdominal   pain and vomiting.  Emesis x 6.    TECHNIQUE:   Axial CT images were acquired through the abdomen and pelvis.  Intravenous contrast:  90 cc of Omnipaque-350 mg/ml were administered,   and 10 cc were discarded.  Oral contrast:  Positive oral contrast was administered.  Coronal and sagittal reformats were generated.     COMPARISON STUDY:  06/15/2018    FINDINGS:  LOWER CHEST: Small hiatal hernia with contrast opacification of the   distal esophagus, suggesting gastroesophageal reflux.    LIVER: Within normal limits.  BILE DUCTS: Pneumobilia seen on 06/15/2018 is no longer present. Mild   central intrahepatic biliary ductal dilatation that appears mildly   progressed since 06/15/2018.  Progressive dilatation of the common bile   duct, now measures 1 cm (602:45), increased from 8 mm on 06/15/2018.    Vague hyperattenuating material in the distal common bile duct at the   level of pancreatic head (602:47).  GALLBLADDER: Contracted.  Hyperattenuating material opacifying the   gallbladder lumen may represent reflux of oral contrast material from the   duodenum.  SPLEEN: A nonspecific 3 mm hypodense focus in the spleen (2:24) is stable   from 06/15/2018  PANCREAS: Moderate atrophy and fatty replacement.  No peripancreatic   edema or fluid collection. Main pancreatic duct is normal in caliber.   ADRENAL GLANDS: Within normal limits.  KIDNEYS/URETERS: The kidney enhance symmetrically without hydronephrosis   or renal stones.  A 3.2 cm left midpole renal cyst is stable.  A 1.2 cm   left lower pole lesion with indeterminate CT density (2:51) is stable.    BLADDER: Within normal limits.  REPRODUCTIVE ORGANS: Uterus and adnexa appear within normal limits.      BOWEL: No bowel obstruction.  Appendix not visualized; no secondary signs   of appendicitis.  Moderate amount of stool in the colon.  PERITONEUM: No ascites, free air or abscess.    RETROPERITONEUM: No retroperitoneal hemorrhage.  No lymphadenopathy.  VASCULATURE: Atherosclerotic calcifications of the aortoiliac tree.    ABDOMINAL WALL/SOFT TISSUES: Within normal limits.  BONES: Mild degenerative changes in the spine.  Mild bilateral hip   osteoarthrosis.      IMPRESSION:  Small hiatal hernia.  Contrast opacification of the distal esophagus,   suggesting gastroesophageal reflux.  No evidence of bowel obstruction.    Contracted gallbladder.  High attenuation material in the gallbladder   lumen may reflect reflux of oral contrast from the duodenum.    Intrahepatic and extrahepatic biliary ductal dilatation appears mildly   progressed from 06/15/2018.  Vague hyperdense material in the distal   common bile duct that may represent choledocholithiasis, sludge or reflux   of bowel contents through an incompetent sphincter of Oddi.  MRI/MRCP can   be performed for further characterization.      A 1.2 cm indeterminant left lower pole renal lesion is stable in size   from 06/15/2018.  This most likely represents a complex cyst.  MRI   characterization can be performed to exclude a suspicious lesion.            EXAM:  US ABDOMEN COMPLETE        PROCEDURE DATE:  06/08/2018           INTERPRETATION:  CLINICAL INFORMATION: Calcification of gallbladder.   Pneumobilia. Reassess calcification of the gallbladder.     COMPARISON: 4/23/2018    TECHNIQUE: Sonography of the abdomen.     FINDINGS:    Liver: Within normal limits. No pneumobilia is seen. The hepatic veins   and portal veins are patent and demonstrate normal direction of flow.    Bile ducts: Normal caliber. Common bile duct measures 6 mm.     Gallbladder: There is focal adenomyomatosis in the fundal region of the   gallbladder. No shadowing gallstones are seen. There is no gallbladder   wall calcification to suggest porcelain gallbladder.        Pancreas: Visualized portions are within normal limits.    Spleen: 9.3 cm. Within normal limits.    Right kidney: 10.4 cm. No hydronephrosis.        Left kidney: 10.5 cm.  No hydronephrosis. There is a 2.5 cm cyst in the   midpole.    Ascites: None.    Aorta and IVC: Visualized portions are within normal limits.    IMPRESSION:     No significant pneumobilia seen.  Focal adenomyomatosis of the gallbladder, unchanged.

## 2019-03-17 NOTE — H&P ADULT - NSICDXPASTMEDICALHX_GEN_ALL_CORE_FT
PAST MEDICAL HISTORY:  GERD (gastroesophageal reflux disease)     HTN (hypertension)     Pulmonary emboli

## 2019-03-17 NOTE — CONSULT NOTE ADULT - SUBJECTIVE AND OBJECTIVE BOX
Time of visit:    CHIEF COMPLAINT: Patient is a 83y old  Female who presents with a chief complaint of nausea and vomiting (17 Mar 2019 05:10)      HPI:  Patient is a 83 female from home, lives with family, with PMHx of HTN, HLD and PE ( completed 1 year of Eliquis) presented to ED with c/o of nausea and vomiting since 1 day. history obtained from daughter at bed side. Patient complained of nausea and multiple episodes ( 6-7) NBNB vomiting since yesterday. patient also complains of RUQ and epigastric pain, 5/10 intensity, radiating to back along with chills. Patient denies fever, headaches, jaundice, chest pain, SOB, palpitation, diarrhea, constipation, dysuria, skin rashes, muscle or joint pains. (17 Mar 2019 05:10)   Patient seen and examined.     PAST MEDICAL & SURGICAL HISTORY:  Pulmonary emboli  GERD (gastroesophageal reflux disease)  HTN (hypertension)  S/P appendectomy      Allergies    No Known Drug Allergies  Seafood (Hives)    Intolerances        MEDICATIONS  (STANDING):  atorvastatin 40 milliGRAM(s) Oral at bedtime  enoxaparin Injectable 40 milliGRAM(s) SubCutaneous daily  losartan 100 milliGRAM(s) Oral daily  sodium chloride 0.9%. 1000 milliLiter(s) (100 mL/Hr) IV Continuous <Continuous>      MEDICATIONS  (PRN):  acetaminophen   Tablet .. 650 milliGRAM(s) Oral every 6 hours PRN Mild Pain (1 - 3), Moderate Pain (4 - 6)   Medications up to date at time of exam.    Medications up to date at time of exam.    FAMILY HISTORY:  Family history of diabetes mellitus: Mother       SOCIAL HISTORY  Smoking History: [   ] smoking/smoke exposure, [   ] former smoker  Living Condition: [   ] apartment, [   ] private house  Work History:   Travel History: denies recent travel  Illicit Substance Use: denies  Alcohol Use: denies    REVIEW OF SYSTEMS:    CONSTITUTIONAL:  denies fevers, chills, sweats, weight loss    HEENT:  denies diplopia or blurred vision, sore throat or runny nose.    CARDIOVASCULAR:  denies pressure, squeezing, tightness, or heaviness about the chest; no palpitations.    RESPIRATORY:  denies SOB, cough, DARDEN, wheezing.    GASTROINTESTINAL:  denies abdominal pain, nausea, vomiting or diarrhea.    GENITOURINARY: denies dysuria, frequency or urgency.    NEUROLOGIC:  denies numbness, tingling, seizures or weakness.    PSYCHIATRIC:  denies disorder of thought or mood.    MSK: denies swelling, redness      PHYSICAL EXAMINATION:    GENERAL: The patient is a well-developed, well-nourished, in no apparent distress.     Vital Signs Last 24 Hrs  T(C): 37.5 (17 Mar 2019 04:16), Max: 37.5 (17 Mar 2019 04:16)  T(F): 99.5 (17 Mar 2019 04:16), Max: 99.5 (17 Mar 2019 04:16)  HR: 80 (17 Mar 2019 06:00) (60 - 87)  BP: 111/49 (17 Mar 2019 06:00) (106/47 - 135/56)  BP(mean): --  RR: 18 (17 Mar 2019 04:16) (16 - 18)  SpO2: 92% (17 Mar 2019 04:16) (92% - 98%)   (if applicable)    Chest Tube (if applicable)    HEENT: Head is normocephalic and atraumatic. Extraocular muscles are intact. Mucous membranes are moist.     NECK: Supple, no palpable adenopathy.    LUNGS: Clear to auscultation, no wheezing, rales, or rhonchi.    HEART: Regular rate and rhythm without murmur.    ABDOMEN: Soft, nontender, and nondistended.  No hepatosplenomegaly is noted.    RENAL: No difficulty voiding, no pelvic pain    EXTREMITIES: Without any cyanosis, clubbing, rash, lesions or edema.    NEUROLOGIC: Awake, alert, oriented, grossly intact    SKIN: Warm, dry, good turgor.      LABS:                        12.6   11.64 )-----------( 194      ( 16 Mar 2019 20:37 )             39.5     03-16    139  |  104  |  25<H>  ----------------------------<  159<H>  4.1   |  29  |  1.10    Ca    8.8      16 Mar 2019 20:37    TPro  7.7  /  Alb  3.7  /  TBili  0.9  /  DBili  x   /  AST  75<H>  /  ALT  38  /  AlkPhos  124<H>  03-16    PT/INR - ( 16 Mar 2019 20:37 )   PT: 11.6 sec;   INR: 1.04 ratio         PTT - ( 16 Mar 2019 20:37 )  PTT:26.4 sec  Urinalysis Basic - ( 16 Mar 2019 23:32 )    Color: Yellow / Appearance: Clear / S.015 / pH: x  Gluc: x / Ketone: Negative  / Bili: Negative / Urobili: 4   Blood: x / Protein: Negative / Nitrite: Negative   Leuk Esterase: Negative / RBC: 0-2 /HPF / WBC 0-2 /HPF   Sq Epi: x / Non Sq Epi: Few /HPF / Bacteria: Trace /HPF                      MICROBIOLOGY: (if applicable)    RADIOLOGY & ADDITIONAL STUDIES:  EKG:   CXR:  ECHO:    IMPRESSION: 83y Female PAST MEDICAL & SURGICAL HISTORY:  Pulmonary emboli  GERD (gastroesophageal reflux disease)  HTN (hypertension)  S/P appendectomy   p/w                   RECOMMENDATIONS: Time of visit:    CHIEF COMPLAINT: Patient is a 83y old  Female who presents with a chief complaint of nausea and vomiting (17 Mar 2019 05:10)      HPI:  Patient is a 83 female from home, lives with family, with PMHx of HTN, HLD and PE ( completed 1 year of Eliquis) presented to ED with c/o of nausea and vomiting since 1 day. history obtained from daughter at bed side. Patient complained of nausea and multiple episodes ( 6-7) NBNB vomiting since yesterday. patient also complains of RUQ and epigastric pain, 5/10 intensity, radiating to back along with chills. Patient denies fever, headaches, jaundice, chest pain, SOB, palpitation, diarrhea, constipation, dysuria, skin rashes, muscle or joint pains. (17 Mar 2019 05:10)   Patient seen and examined.     PAST MEDICAL & SURGICAL HISTORY:  Pulmonary emboli  GERD (gastroesophageal reflux disease)  HTN (hypertension)  S/P appendectomy      Allergies    No Known Drug Allergies  Seafood (Hives)    Intolerances        MEDICATIONS  (STANDING):  atorvastatin 40 milliGRAM(s) Oral at bedtime  enoxaparin Injectable 40 milliGRAM(s) SubCutaneous daily  losartan 100 milliGRAM(s) Oral daily  sodium chloride 0.9%. 1000 milliLiter(s) (100 mL/Hr) IV Continuous <Continuous>      MEDICATIONS  (PRN):  acetaminophen   Tablet .. 650 milliGRAM(s) Oral every 6 hours PRN Mild Pain (1 - 3), Moderate Pain (4 - 6)   Medications up to date at time of exam.    Medications up to date at time of exam.    FAMILY HISTORY:  Family history of diabetes mellitus: Mother       SOCIAL HISTORY  Smoking History: [   ] smoking/smoke exposure, [   ] former smoker  Living Condition: [   ] apartment, [   ] private house  Work History:   Travel History: denies recent travel  Illicit Substance Use: denies  Alcohol Use: denies    REVIEW OF SYSTEMS:    CONSTITUTIONAL:  denies fevers, chills, sweats, weight loss    HEENT:  denies diplopia or blurred vision, sore throat or runny nose.    CARDIOVASCULAR:  denies pressure, squeezing, tightness, or heaviness about the chest; no palpitations.    RESPIRATORY:  denies SOB, cough, DARDEN, wheezing.    GASTROINTESTINAL:  denies abdominal pain, nausea, vomiting or diarrhea.    GENITOURINARY: denies dysuria, frequency or urgency.    NEUROLOGIC:  denies numbness, tingling, seizures or weakness.    PSYCHIATRIC:  denies disorder of thought or mood.    MSK: denies swelling, redness      PHYSICAL EXAMINATION:    GENERAL: The patient is a well-developed, well-nourished, in no apparent distress.     Vital Signs Last 24 Hrs  T(C): 37.5 (17 Mar 2019 04:16), Max: 37.5 (17 Mar 2019 04:16)  T(F): 99.5 (17 Mar 2019 04:16), Max: 99.5 (17 Mar 2019 04:16)  HR: 80 (17 Mar 2019 06:00) (60 - 87)  BP: 111/49 (17 Mar 2019 06:00) (106/47 - 135/56)  BP(mean): --  RR: 18 (17 Mar 2019 04:16) (16 - 18)  SpO2: 92% (17 Mar 2019 04:16) (92% - 98%)   (if applicable)    Chest Tube (if applicable)    HEENT: Head is normocephalic and atraumatic. Extraocular muscles are intact. Mucous membranes are moist.     NECK: Supple, no palpable adenopathy.    LUNGS: Clear to auscultation, no wheezing, rales, or rhonchi.    HEART: Regular rate and rhythm without murmur.    ABDOMEN: Soft, nontender, and nondistended.  No hepatosplenomegaly is noted.    RENAL: No difficulty voiding, no pelvic pain    EXTREMITIES: Without any cyanosis, clubbing, rash, lesions or edema.    NEUROLOGIC: Awake, alert, oriented, grossly intact    SKIN: Warm, dry, good turgor.      LABS:                        12.6   11.64 )-----------( 194      ( 16 Mar 2019 20:37 )             39.5     03-16    139  |  104  |  25<H>  ----------------------------<  159<H>  4.1   |  29  |  1.10    Ca    8.8      16 Mar 2019 20:37    TPro  7.7  /  Alb  3.7  /  TBili  0.9  /  DBili  x   /  AST  75<H>  /  ALT  38  /  AlkPhos  124<H>  03-16    PT/INR - ( 16 Mar 2019 20:37 )   PT: 11.6 sec;   INR: 1.04 ratio         PTT - ( 16 Mar 2019 20:37 )  PTT:26.4 sec  Urinalysis Basic - ( 16 Mar 2019 23:32 )    Color: Yellow / Appearance: Clear / S.015 / pH: x  Gluc: x / Ketone: Negative  / Bili: Negative / Urobili: 4   Blood: x / Protein: Negative / Nitrite: Negative   Leuk Esterase: Negative / RBC: 0-2 /HPF / WBC 0-2 /HPF   Sq Epi: x / Non Sq Epi: Few /HPF / Bacteria: Trace /HPF                      MICROBIOLOGY: (if applicable)    RADIOLOGY & ADDITIONAL STUDIES:  EKG:   CXR:  < from: Xray Chest 1 View- PORTABLE-Routine (19 @ 12:01) >    EXAM:  XR CHEST PORTABLE ROUTINE 1V                            PROCEDURE DATE:  2019          INTERPRETATION:  INDICATION: Cough, abdominal pain    PRIORS: 2018    VIEWS: Portable AP radiography of the chest performed. Evaluation limited  secondary to shallow inspiration and portable technique.    FINDINGS: Heart size appears within normal limits. No definite hilar or   superior mediastinal abnormalities noted. Interstitial prominence   bilaterally may be related to shallow inspiration; an element of   interstitial edema cannot be excluded. Linear scarlike opacity is noted   within the periphery of the left midlung field. No pleural effusion or   pneumothorax is demonstrated. No mediastinal shift is noted. No acute   osseous fractures are identified.      IMPRESSION: Interstitial prominence bilaterally may be related to shallow   inspiration; an element of interstitial edema cannot be excluded. Linear   scarlike opacity is noted within the periphery of the left midlung field.                 ROBERTO KEARNEY   This document has been electronically signed. Mar 18 2019 12:26PM                < end of copied text >    ECHO:    IMPRESSION: 83y Female PAST MEDICAL & SURGICAL HISTORY:  Pulmonary emboli  GERD (gastroesophageal reflux disease)  HTN (hypertension)  S/P appendectomy     Patient is a 83 female from home, lives with family, with PMHx of HTN, HLD and PE ( completed 1 year of Eliquis) presented to ED with c/o of nausea and vomiting since 1 day. history obtained from daughter at bed side. Patient complained of nausea and multiple episodes ( 6-7) NBNB vomiting since yesterday. patient also complains of RUQ and epigastric pain, 5/10 intensity, radiating to back along with chills. Patient denies fever, headaches, jaundice, chest pain, SOB, palpitation, diarrhea, constipation, dysuria, skin rashes, muscle or joint pains.     --    83 y/o female with pmhx as above presents with nausea, vomiting. SOB due to mild pulm edema.    +leukocytosis  +pulm edema, mild  +procalcitonin 16    per GI, pancreatitis vs fecal impaction vs biliary obstruction    RECOMMENDATIONS:     - con't with antibiotics as per ID recommendations.    - abd pain improved   - GI follow up   - DVT and GI prophylaxis.

## 2019-03-17 NOTE — H&P ADULT - HISTORY OF PRESENT ILLNESS
Patient is a 83 female from home, lives with family, with PMHx of HTN, HLD and PE ( completed 1 year of Eliquis) presented to ED with c/o of nausea and vomiting since 1 day. history obtained from daughter at bed side. Patient complained of nausea and multiple episodes ( 6-7) NBNB vomiting since yesterday. patient also complains of RUQ and epigastric pain, 5/10 intensity, radiating to back along with chills. Patient denies fever, headaches, jaundice, chest pain, SOB, palpitation, diarrhea, constipation, dysuria, skin rashes, muscle or joint pains.

## 2019-03-18 ENCOUNTER — TRANSCRIPTION ENCOUNTER (OUTPATIENT)
Age: 84
End: 2019-03-18

## 2019-03-18 LAB
ALBUMIN SERPL ELPH-MCNC: 3 G/DL — LOW (ref 3.5–5)
ALP SERPL-CCNC: 145 U/L — HIGH (ref 40–120)
ALT FLD-CCNC: 131 U/L DA — HIGH (ref 10–60)
ANION GAP SERPL CALC-SCNC: 5 MMOL/L — SIGNIFICANT CHANGE UP (ref 5–17)
APTT BLD: 27.6 SEC — SIGNIFICANT CHANGE UP (ref 27.5–36.3)
AST SERPL-CCNC: 96 U/L — HIGH (ref 10–40)
BILIRUB SERPL-MCNC: 1.9 MG/DL — HIGH (ref 0.2–1.2)
BUN SERPL-MCNC: 15 MG/DL — SIGNIFICANT CHANGE UP (ref 7–18)
CALCIUM SERPL-MCNC: 8.4 MG/DL — SIGNIFICANT CHANGE UP (ref 8.4–10.5)
CHLORIDE SERPL-SCNC: 104 MMOL/L — SIGNIFICANT CHANGE UP (ref 96–108)
CO2 SERPL-SCNC: 26 MMOL/L — SIGNIFICANT CHANGE UP (ref 22–31)
CREAT SERPL-MCNC: 0.89 MG/DL — SIGNIFICANT CHANGE UP (ref 0.5–1.3)
CULTURE RESULTS: SIGNIFICANT CHANGE UP
GGT SERPL-CCNC: 162 U/L — HIGH (ref 8–40)
GLUCOSE BLDC GLUCOMTR-MCNC: 72 MG/DL — SIGNIFICANT CHANGE UP (ref 70–99)
GLUCOSE BLDC GLUCOMTR-MCNC: 82 MG/DL — SIGNIFICANT CHANGE UP (ref 70–99)
GLUCOSE BLDC GLUCOMTR-MCNC: 94 MG/DL — SIGNIFICANT CHANGE UP (ref 70–99)
GLUCOSE SERPL-MCNC: 94 MG/DL — SIGNIFICANT CHANGE UP (ref 70–99)
HBA1C BLD-MCNC: 5.9 % — HIGH (ref 4–5.6)
HCT VFR BLD CALC: 35 % — SIGNIFICANT CHANGE UP (ref 34.5–45)
HGB BLD-MCNC: 11.2 G/DL — LOW (ref 11.5–15.5)
INR BLD: 1.45 RATIO — HIGH (ref 0.88–1.16)
MAGNESIUM SERPL-MCNC: 2.2 MG/DL — SIGNIFICANT CHANGE UP (ref 1.6–2.6)
MCHC RBC-ENTMCNC: 28.1 PG — SIGNIFICANT CHANGE UP (ref 27–34)
MCHC RBC-ENTMCNC: 32 GM/DL — SIGNIFICANT CHANGE UP (ref 32–36)
MCV RBC AUTO: 87.7 FL — SIGNIFICANT CHANGE UP (ref 80–100)
NRBC # BLD: 0 /100 WBCS — SIGNIFICANT CHANGE UP (ref 0–0)
PHOSPHATE SERPL-MCNC: 2.5 MG/DL — SIGNIFICANT CHANGE UP (ref 2.5–4.5)
PLATELET # BLD AUTO: 130 K/UL — LOW (ref 150–400)
POTASSIUM SERPL-MCNC: 3.5 MMOL/L — SIGNIFICANT CHANGE UP (ref 3.5–5.3)
POTASSIUM SERPL-SCNC: 3.5 MMOL/L — SIGNIFICANT CHANGE UP (ref 3.5–5.3)
PROT SERPL-MCNC: 6.6 G/DL — SIGNIFICANT CHANGE UP (ref 6–8.3)
PROTHROM AB SERPL-ACNC: 16.3 SEC — HIGH (ref 10–12.9)
RBC # BLD: 3.99 M/UL — SIGNIFICANT CHANGE UP (ref 3.8–5.2)
RBC # FLD: 13.9 % — SIGNIFICANT CHANGE UP (ref 10.3–14.5)
SODIUM SERPL-SCNC: 135 MMOL/L — SIGNIFICANT CHANGE UP (ref 135–145)
SPECIMEN SOURCE: SIGNIFICANT CHANGE UP
WBC # BLD: 9.6 K/UL — SIGNIFICANT CHANGE UP (ref 3.8–10.5)
WBC # FLD AUTO: 9.6 K/UL — SIGNIFICANT CHANGE UP (ref 3.8–10.5)

## 2019-03-18 PROCEDURE — 71045 X-RAY EXAM CHEST 1 VIEW: CPT | Mod: 26

## 2019-03-18 RX ORDER — SODIUM CHLORIDE 9 MG/ML
1000 INJECTION INTRAMUSCULAR; INTRAVENOUS; SUBCUTANEOUS
Qty: 0 | Refills: 0 | Status: DISCONTINUED | OUTPATIENT
Start: 2019-03-18 | End: 2019-03-19

## 2019-03-18 RX ORDER — ACETAMINOPHEN 500 MG
650 TABLET ORAL ONCE
Qty: 0 | Refills: 0 | Status: DISCONTINUED | OUTPATIENT
Start: 2019-03-18 | End: 2019-03-18

## 2019-03-18 RX ORDER — INSULIN LISPRO 100/ML
VIAL (ML) SUBCUTANEOUS EVERY 6 HOURS
Qty: 0 | Refills: 0 | Status: DISCONTINUED | OUTPATIENT
Start: 2019-03-18 | End: 2019-03-20

## 2019-03-18 RX ORDER — KETOROLAC TROMETHAMINE 30 MG/ML
15 SYRINGE (ML) INJECTION EVERY 4 HOURS
Qty: 0 | Refills: 0 | Status: DISCONTINUED | OUTPATIENT
Start: 2019-03-18 | End: 2019-03-20

## 2019-03-18 RX ORDER — ACETAMINOPHEN 500 MG
650 TABLET ORAL EVERY 6 HOURS
Qty: 0 | Refills: 0 | Status: DISCONTINUED | OUTPATIENT
Start: 2019-03-18 | End: 2019-03-20

## 2019-03-18 RX ORDER — PANTOPRAZOLE SODIUM 20 MG/1
40 TABLET, DELAYED RELEASE ORAL DAILY
Qty: 0 | Refills: 0 | Status: DISCONTINUED | OUTPATIENT
Start: 2019-03-18 | End: 2019-03-20

## 2019-03-18 RX ADMIN — PANTOPRAZOLE SODIUM 40 MILLIGRAM(S): 20 TABLET, DELAYED RELEASE ORAL at 11:53

## 2019-03-18 RX ADMIN — PIPERACILLIN AND TAZOBACTAM 25 GRAM(S): 4; .5 INJECTION, POWDER, LYOPHILIZED, FOR SOLUTION INTRAVENOUS at 17:24

## 2019-03-18 RX ADMIN — Medication 650 MILLIGRAM(S): at 13:55

## 2019-03-18 RX ADMIN — PIPERACILLIN AND TAZOBACTAM 25 GRAM(S): 4; .5 INJECTION, POWDER, LYOPHILIZED, FOR SOLUTION INTRAVENOUS at 09:25

## 2019-03-18 RX ADMIN — PIPERACILLIN AND TAZOBACTAM 25 GRAM(S): 4; .5 INJECTION, POWDER, LYOPHILIZED, FOR SOLUTION INTRAVENOUS at 02:13

## 2019-03-18 RX ADMIN — SODIUM CHLORIDE 100 MILLILITER(S): 9 INJECTION INTRAMUSCULAR; INTRAVENOUS; SUBCUTANEOUS at 08:29

## 2019-03-18 RX ADMIN — ENOXAPARIN SODIUM 40 MILLIGRAM(S): 100 INJECTION SUBCUTANEOUS at 11:53

## 2019-03-18 NOTE — PROGRESS NOTE ADULT - SUBJECTIVE AND OBJECTIVE BOX
Meds:  piperacillin/tazobactam IVPB. 3.375 Gram(s) IV Intermittent every 8 hours    Allergies:  Allergies    No Known Drug Allergies  Seafood (Hives)    Intolerances        ROS  [  ] UNABLE TO ELICIT    General:  [  ] None  [  ] Fever  [ x ] Chills  [ x ] Malaise    Skin:  [ x ] None [  ] Rash  [  ] Wound  [  ] Ulcer    HEENT:  [ x ] None  [  ] Sore Throat  [  ] Nasal congestion/ runny nose  [  ] Photophobia [  ] Neck pain      Chest:  [ x ] None   [  ] SOB  [  ] Cough  [  ] None    Cardiovascular:   [ x ] None  [  ] CP  [  ] Palpitation    Gastrointestinal:  [  ] None  [ x ] Abd pain   [ x ] Nausea    [ x ] Vomiting   [  ] Diarrhea	     Genitourinary:  [ x ] None [  ] Polyuria   [  ] Urgency  [  ] Frequency  [  ] Dysuria    [  ]  Hematuria       Musculoskeletal:  [  ] None [  ] Back Pain	[  ] Body aches  [  ] Joint pain  [ x ] Weakness    Neurological: [  ] None [  ]Dizziness  [  ]Visual Disturbance  [  ]Headaches   [ x ] Weakness        PHYSICAL EXAM:    Vital Signs Last 24 Hrs  T(C): 37.5 (18 Mar 2019 05:54), Max: 37.8 (18 Mar 2019 04:36)  T(F): 99.5 (18 Mar 2019 05:54), Max: 100.1 (18 Mar 2019 04:36)  HR: 80 (18 Mar 2019 04:36) (64 - 82)  BP: 106/44 (18 Mar 2019 04:36) (92/41 - 111/49)  BP(mean): --  RR: 16 (18 Mar 2019 04:36) (16 - 18)  SpO2: 95% (18 Mar 2019 04:36) (93% - 95%)    Constitutional:    HEENT: [ x ] Wnl  [  ] Pharyngeal congestion    Neck:  [ x ] Supple  [  ]Lymphadenopathy  [ x ] No JVD   [  ] JVD  [  ] Masses   [  ] WNL    CHEST/Respiratory:  [ x ]Clear to auscultation  [  ] Rales   [  ] Rhonchi   [  ] Wheezing     [  ] Chest Tenderness      Cardiovascular:  [ x ] Reg S1 S2   [  ] Irreg S1 S2   [ x ]No Murmur  [  ] +ve Murmurs  [  ]Systolic [  ]Diastolic      Abdomen:  [ x ] Soft  [ x ] No tendrerness  [  ] Tenderness  [  ] Organomegaly  [  ] ABD Distention  [  ] Rigidity                       [ x ] No Regidity                       [ x ] No Rebound Tenderness  [ x ] No Guarding Rigidity  [  ] Rebound Tenderness[  ] Guarding Rigidity                          [ x ]  +ve Bowel Sounds  [  ] Decreased Bowel Sounds    [  ] Absent Bowel Sounds                            Extremities: [ x ] No edema [  ] Edema  [  ] Clubbing   [  ] Cyanosis                         [ x ] No Tender Calf muscles  [  ] Tender Calf muscles                        [ x ] Palpable peripheral pulses    Neurological: [ x ] Awake  [ x ] Alert  [ x ] Oriented  x  3                           [  ] Confused  [  ] Drowsy  [  ] respond to painful stimuli  [  ] Unresponsive    Skin:  [ x ] Intact [  ] Redness [  ] Thrombophlebitis  [  ] Rashes  [  ] Dry  [  ] Ulcers    Ortho:  [  ] Joint Swelling  [  ] Joint erythema [  ] Joint tenderness                [  ] Increased temp. to touch  [  ] DJD [ x ] WNL            LABS/DIAGNOSTIC TESTS                          11.2   9.60  )-----------( 130      ( 18 Mar 2019 07:33 )             35.0         03-18    135  |  104  |  15  ----------------------------<  94  3.5   |  26  |  0.89    Ca    8.4      18 Mar 2019 07:33  Phos  2.5     03-18  Mg     2.2     03-18    TPro  6.6  /  Alb  3.0<L>  /  TBili  1.9<H>  /  DBili  1.0<H>  /  AST  96<H>  /  ALT  131<H>  /  AlkPhos  145<H>  03-18      LIVER FUNCTIONS - ( 18 Mar 2019 07:33 )  Alb: 3.0 g/dL / Pro: 6.6 g/dL / ALK PHOS: 145 U/L / ALT: 131 U/L DA / AST: 96 U/L / GGT: x               CULTURES:   Culture - Urine (03.17.19 @ 13:00)    Specimen Source: .Urine    Culture Results:   <10,000 CFU/mL Normal Urogenital [place]        RADIOLOGY    EXAM:  MR MRCP [facility] IC                            PROCEDURE DATE:  03/17/2019          INTERPRETATION:  MRCP without contrast    Indication: Right upper quadrant abdominal pain.    Technique: Utilizing a 1.5 Lily high-field magnet, multiplanar   multisequence MR images of the abdomen are acquired without IV contrast,   supplemented by thin and thick slab MRCP images. IV contrast is not   administered due to patient's request to stop the examination.    Comparison: No prior abdominal MR is available for comparison.    Findings: No evidence for a gallstone in the gallbladder. Nonspecific   trace pericholecystic fluid.    There is an apparent 1.2 cm filling defect in the distal common bile duct   (image 80 series 4) with associated upstream common bile duct dilatation   at 1.0 cm in caliber. The filling defect has angular margins. The main   pancreatic duct maintains normal caliber without dilatation.    Multiple tiny brightly T2 hyperintense lesions are seen in the pancreas   measuring upto approximately 3 mm. These may represent nonspecific   cystic neoplasms of the pancreas or IPMN. The pancreas is atrophic.    There is a nonspecific 6 mm brightly T2 hyperintense lesion in the   spleen. This may represent a nonspecific cyst or hemangioma. Further   evaluation is difficult without IV contrast.    Multiple brightly T2 hyperintense lesions are seen in the kidneys   bilaterally measuring up to 3.0 cm on the left, representing probable   cysts. The adrenals appear unremarkable.    No evidence for ascites, or enlarged lymph node.    Impression: No evidence for a gallstone in the gallbladder. Nonspecific   trace pericholecystic fluid.    Apparent 1.2 cm filling defect in the distal common bile duct with   associated upstream common bile duct dilatation at 1.0 cm in caliber. The   filling defect may represent a common bile duct stone or a   mass/cholangiocarcinoma. Further differentiation is difficult without IV   contrast. A common bile duct stone is favored due to its angularmargins.   ERCP is recommended for further evaluation.    Other findings as above.    A preliminary report was provided by [employer].      Assessment and Recommendation:  Patient is a 83 female from home, lives with family, with PMHx of HTN, HLD and PE ( completed 1 year of Eliquis) presented to ED with c/o of nausea and vomiting since 1 day. history obtained from daughter at bed side. Patient complained of nausea and multiple episodes ( 6-7) NBNB vomiting since yesterday. patient also complains of RUQ and epigastric pain, 5/10 intensity, radiating to back along with chills.  Hospital work up suggested Pancreatitis and dilated, and obsctructed CBD.  Patient was admitted and was started on IV Zosyn.  3/18/19 Patient is afebrile and WBC became normal and patient feels much better today, also liver enzymes are improving.    PROBLEMS AND PLAN:  # ACUTE PANCREATITIS, POSSIBLE CHOLECYSTITIS.  1- UA & CS suggested contamination.  2- Follow Blood culture to final report.  3- GI management and follow up.  4- Continue IV Zosyn.  5- Fluid and electrolytes management.  6- CBC and CMP follow up.   7- Observe for fever or leukocytosis.     # HTN.  1- Monitor Blood pressure closely.  2- Blood pressure control.  3- BP. meds as per cardiology and primary care team.       Discussed with medical resident.

## 2019-03-18 NOTE — PROGRESS NOTE ADULT - SUBJECTIVE AND OBJECTIVE BOX
Patient seen and examined.     MEDICATIONS  (STANDING):  acetaminophen  Suppository .. 650 milliGRAM(s) Rectal once  enoxaparin Injectable 40 milliGRAM(s) SubCutaneous daily  insulin lispro (HumaLOG) corrective regimen sliding scale   SubCutaneous every 6 hours  pantoprazole  Injectable 40 milliGRAM(s) IV Push daily  piperacillin/tazobactam IVPB. 3.375 Gram(s) IV Intermittent every 8 hours  saline laxative (FLEET) Rectal Enema 1 Enema Rectal once  sodium chloride 0.9%. 1000 milliLiter(s) (100 mL/Hr) IV Continuous <Continuous>      MEDICATIONS  (PRN):  ketorolac   Injectable 15 milliGRAM(s) IV Push every 4 hours PRN Moderate Pain (4 - 6)  ondansetron Injectable 4 milliGRAM(s) IV Push every 6 hours PRN Nausea and/or Vomiting     Medications up to date at time of exam.    PHYSICAL EXAMINATION:  Patient has no new complaints.  GENERAL: The patient is a well-developed, well-nourished, in no apparent distress.     Vital Signs Last 24 Hrs  T(C): 37.5 (18 Mar 2019 05:54), Max: 37.8 (18 Mar 2019 04:36)  T(F): 99.5 (18 Mar 2019 05:54), Max: 100.1 (18 Mar 2019 04:36)  HR: 80 (18 Mar 2019 04:36) (64 - 82)  BP: 106/44 (18 Mar 2019 04:36) (92/41 - 111/49)  BP(mean): --  RR: 16 (18 Mar 2019 04:36) (16 - 18)  SpO2: 95% (18 Mar 2019 04:36) (93% - 95%)   (if applicable)    Chest Tube (if applicable)    HEENT: Head is normocephalic and atraumatic.     NECK: Supple, no palpable adenopathy.    LUNGS: Clear to auscultation, no wheezing, rales, or rhonchi.    HEART: Regular rate and rhythm without murmur.    ABDOMEN: Soft, nontender, and nondistended.      EXTREMITIES: Without any cyanosis, clubbing, rash, lesions or edema.    NEUROLOGIC: Awake, alert.    SKIN: Warm, dry, good turgor.    LABS:                        11.2   9.60  )-----------( 130      ( 18 Mar 2019 07:33 )             35.0         135  |  104  |  15  ----------------------------<  94  3.5   |  26  |  0.89    Ca    8.4      18 Mar 2019 07:33  Phos  2.5       Mg     2.2         TPro  6.6  /  Alb  3.0<L>  /  TBili  1.9<H>  /  DBili  1.0<H>  /  AST  96<H>  /  ALT  131<H>  /  AlkPhos  145<H>      PT/INR - ( 18 Mar 2019 07:33 )   PT: 16.3 sec;   INR: 1.45 ratio         PTT - ( 18 Mar 2019 07:33 )  PTT:27.6 sec  Urinalysis Basic - ( 16 Mar 2019 23:32 )    Color: Yellow / Appearance: Clear / S.015 / pH: x  Gluc: x / Ketone: Negative  / Bili: Negative / Urobili: 4   Blood: x / Protein: Negative / Nitrite: Negative   Leuk Esterase: Negative / RBC: 0-2 /HPF / WBC 0-2 /HPF   Sq Epi: x / Non Sq Epi: Few /HPF / Bacteria: Trace /HPF        Procalcitonin, Serum: 16.50 ng/mL (19 @ 21:30)      MICROBIOLOGY: (if applicable)    RADIOLOGY & ADDITIONAL STUDIES:  EKG:   CXR:  ECHO:    IMPRESSION: 83y Female PAST MEDICAL & SURGICAL HISTORY:  Pulmonary emboli  GERD (gastroesophageal reflux disease)  HTN (hypertension)  S/P appendectomy       Patient is a 83 female from home, lives with family, with PMHx of HTN, HLD and PE ( completed 1 year of Eliquis) presented to ED with c/o of nausea and vomiting since 1 day. history obtained from daughter at bed side. Patient complained of nausea and multiple episodes ( 6-7) NBNB vomiting since yesterday. patient also complains of RUQ and epigastric pain, 5/10 intensity, radiating to back along with chills. Patient denies fever, headaches, jaundice, chest pain, SOB, palpitation, diarrhea, constipation, dysuria, skin rashes, muscle or joint pains.     --    81 y/o female with pmhx as above presents with nausea, vomiting. SOB due to mild pulm edema.    +leukocytosis  +pulm edema, mild  +procalcitonin 16    per GI, pancreatitis vs fecal impaction vs biliary obstruction    RECOMMENDATIONS:     - con't with antibiotics as per ID recommendations.    - abd pain improved   - GI follow up   - DVT and GI prophylaxis. Patient seen and examined.     MEDICATIONS  (STANDING):  acetaminophen  Suppository .. 650 milliGRAM(s) Rectal once  enoxaparin Injectable 40 milliGRAM(s) SubCutaneous daily  insulin lispro (HumaLOG) corrective regimen sliding scale   SubCutaneous every 6 hours  pantoprazole  Injectable 40 milliGRAM(s) IV Push daily  piperacillin/tazobactam IVPB. 3.375 Gram(s) IV Intermittent every 8 hours  saline laxative (FLEET) Rectal Enema 1 Enema Rectal once  sodium chloride 0.9%. 1000 milliLiter(s) (100 mL/Hr) IV Continuous <Continuous>      MEDICATIONS  (PRN):  ketorolac   Injectable 15 milliGRAM(s) IV Push every 4 hours PRN Moderate Pain (4 - 6)  ondansetron Injectable 4 milliGRAM(s) IV Push every 6 hours PRN Nausea and/or Vomiting     Medications up to date at time of exam.    PHYSICAL EXAMINATION:  Patient has no new complaints.  GENERAL: The patient is a well-developed, well-nourished, in no apparent distress.     Vital Signs Last 24 Hrs  T(C): 37.5 (18 Mar 2019 05:54), Max: 37.8 (18 Mar 2019 04:36)  T(F): 99.5 (18 Mar 2019 05:54), Max: 100.1 (18 Mar 2019 04:36)  HR: 80 (18 Mar 2019 04:36) (64 - 82)  BP: 106/44 (18 Mar 2019 04:36) (92/41 - 111/49)  BP(mean): --  RR: 16 (18 Mar 2019 04:36) (16 - 18)  SpO2: 95% (18 Mar 2019 04:36) (93% - 95%)   (if applicable)    Chest Tube (if applicable)    HEENT: Head is normocephalic and atraumatic.     NECK: Supple, no palpable adenopathy.    LUNGS: Clear to auscultation, no wheezing, rales, or rhonchi.    HEART: Regular rate and rhythm without murmur.    ABDOMEN: Soft, nontender, and nondistended.      EXTREMITIES: Without any cyanosis, clubbing, rash, lesions or edema.    NEUROLOGIC: Awake, alert.    SKIN: Warm, dry, good turgor.    LABS:                        11.2   9.60  )-----------( 130      ( 18 Mar 2019 07:33 )             35.0         135  |  104  |  15  ----------------------------<  94  3.5   |  26  |  0.89    Ca    8.4      18 Mar 2019 07:33  Phos  2.5       Mg     2.2         TPro  6.6  /  Alb  3.0<L>  /  TBili  1.9<H>  /  DBili  1.0<H>  /  AST  96<H>  /  ALT  131<H>  /  AlkPhos  145<H>      PT/INR - ( 18 Mar 2019 07:33 )   PT: 16.3 sec;   INR: 1.45 ratio         PTT - ( 18 Mar 2019 07:33 )  PTT:27.6 sec  Urinalysis Basic - ( 16 Mar 2019 23:32 )    Color: Yellow / Appearance: Clear / S.015 / pH: x  Gluc: x / Ketone: Negative  / Bili: Negative / Urobili: 4   Blood: x / Protein: Negative / Nitrite: Negative   Leuk Esterase: Negative / RBC: 0-2 /HPF / WBC 0-2 /HPF   Sq Epi: x / Non Sq Epi: Few /HPF / Bacteria: Trace /HPF        Procalcitonin, Serum: 16.50 ng/mL (19 @ 21:30)      MICROBIOLOGY: (if applicable)    RADIOLOGY & ADDITIONAL STUDIES:  EKG:   CXR:  MRCP:  < from: MR MRCP w/wo IV Cont (19 @ 10:28) >    EXAM:  MR MRCP [facility] IC                            PROCEDURE DATE:  2019          INTERPRETATION:  MRCP without contrast    Indication: Right upper quadrant abdominal pain.    Technique: Utilizing a 1.5 Lily high-field magnet, multiplanar   multisequence MR images of the abdomen are acquired without IV contrast,   supplemented by thin and thick slab MRCP images. IV contrast is not   administered due to patient's request to stop the examination.    Comparison: No prior abdominal MR is available for comparison.    Findings: No evidence for a gallstone in the gallbladder. Nonspecific   trace pericholecystic fluid.    There is an apparent 1.2 cm filling defect in the distal common bile duct   (image 80 series 4) with associated upstream common bile duct dilatation   at 1.0 cm in caliber. The filling defect has angular margins. The main   pancreatic duct maintains normal caliber without dilatation.    Multiple tiny brightly T2 hyperintense lesions are seen in the pancreas   measuring upto approximately 3 mm. These may represent nonspecific   cystic neoplasms of the pancreas or IPMN. The pancreas is atrophic.    There is a nonspecific 6 mm brightly T2 hyperintense lesion in the   spleen. This may represent a nonspecific cyst or hemangioma. Further   evaluation is difficult without IV contrast.    Multiple brightly T2 hyperintense lesions are seen in the kidneys   bilaterally measuring up to 3.0 cm on the left, representing probable   cysts. The adrenals appear unremarkable.    No evidence for ascites, or enlarged lymph node.    Impression: No evidence for a gallstone in the gallbladder. Nonspecific   trace pericholecystic fluid.    Apparent 1.2 cm filling defect in the distal common bile duct with   associated upstream common bile duct dilatation at 1.0 cm in caliber. The   filling defect may represent a common bile duct stone or a   mass/cholangiocarcinoma. Further differentiation is difficult without IV   contrast. A common bile duct stone is favored due to its angularmargins.   ERCP is recommended for further evaluation.    Other findings as above.    A preliminary report was provided by [employer].                  NIK CHRISTINE M.D., ATTENDING RADIOLOGIST  This document has been electronically signed. 2019  9:43AM                < end of copied text >    IMPRESSION: 83y Female PAST MEDICAL & SURGICAL HISTORY:  Pulmonary emboli  GERD (gastroesophageal reflux disease)  HTN (hypertension)  S/P appendectomy       Patient is a 83 female from home, lives with family, with PMHx of HTN, HLD and PE ( completed 1 year of Eliquis) presented to ED with c/o of nausea and vomiting since 1 day. history obtained from daughter at bed side. Patient complained of nausea and multiple episodes ( 6-7) NBNB vomiting since yesterday. patient also complains of RUQ and epigastric pain, 5/10 intensity, radiating to back along with chills. Patient denies fever, headaches, jaundice, chest pain, SOB, palpitation, diarrhea, constipation, dysuria, skin rashes, muscle or joint pains.     --    81 y/o female with pmhx as above presents with nausea, vomiting. SOB due to mild pulm edema, acute pancreatitis     +leukocytosis  +pulm edema, mild  +procalcitonin 16    per GI, pancreatitis vs fecal impaction vs biliary obstruction    RECOMMENDATIONS:     - con't with antibiotics as per ID recommendations.    - abd pain improved   - GI follow up   - DVT and GI prophylaxis.     Agree with above assessment and plan as transcribed.

## 2019-03-18 NOTE — PROGRESS NOTE ADULT - SUBJECTIVE AND OBJECTIVE BOX
Patient is a 83y old  Female who presents with a chief complaint of nausea and vomiting (17 Mar 2019 20:04)    pt seen in icu [  ], reg med floor [ x  ], bed [ x ], chair at bedside [   ], a+o x3 [x  ], lethargic [  ],  nad [x  ]        Allergies    No Known Drug Allergies  Seafood (Hives)        Vitals    T(F): 99.5 (03-18-19 @ 05:54), Max: 100.1 (03-18-19 @ 04:36)  HR: 80 (03-18-19 @ 04:36) (64 - 82)  BP: 106/44 (03-18-19 @ 04:36) (92/41 - 111/49)  RR: 16 (03-18-19 @ 04:36) (16 - 18)  SpO2: 95% (03-18-19 @ 04:36) (93% - 95%)  Wt(kg): --  CAPILLARY BLOOD GLUCOSE          Labs                          10.9   19.87 )-----------( 146      ( 17 Mar 2019 12:47 )             33.6       03-18    135  |  104  |  15  ----------------------------<  94  3.5   |  26  |  0.89    Ca    8.4      18 Mar 2019 07:33  Phos  2.5     03-18  Mg     2.2     03-18    TPro  6.6  /  Alb  3.0<L>  /  TBili  1.9<H>  /  DBili  1.0<H>  /  AST  96<H>  /  ALT  131<H>  /  AlkPhos  145<H>  03-18    Lipase, Serum: 98 U/L (03.17.19 @ 12:47)          Radiology Results      Meds    MEDICATIONS  (STANDING):  atorvastatin 40 milliGRAM(s) Oral at bedtime  enoxaparin Injectable 40 milliGRAM(s) SubCutaneous daily  piperacillin/tazobactam IVPB. 3.375 Gram(s) IV Intermittent every 8 hours  sodium chloride 0.9%. 1000 milliLiter(s) (100 mL/Hr) IV Continuous <Continuous>      MEDICATIONS  (PRN):  acetaminophen   Tablet .. 650 milliGRAM(s) Oral every 6 hours PRN Mild Pain (1 - 3), Moderate Pain (4 - 6)  ondansetron Injectable 4 milliGRAM(s) IV Push every 6 hours PRN Nausea and/or Vomiting      Physical Exam    Neuro :  no focal deficits  Respiratory: CTA B/L  CV: RRR, S1S2, no murmurs,   Abdominal: Soft, NT, ND +BS,  Extremities: No edema, + peripheral pulses    ASSESSMENT    Abdominal pain 2nd tp acute pancreatitis  choledocholithiasis   r/o cholangitis  gerd  duodenitis,  fecal impaction  h/o hearing impaired,   Pulmonary emboli  GERD (gastroesophageal reflux disease)  HTN (hypertension)  S/P appendectomy  No significant past surgical history      PLAN      gi f/u  Fleet enema x 2 half hour apart  Citrate magnesium on bottle (8Oz) po half hour after second enema  Lipase wnl noted above   LFT's improving  mrcp with distal cbd stone noted above  npo  ercp as per gi  cont protonix  cont zosyn  id f/u  f/u blood cx  pulm f/u  cont current meds

## 2019-03-18 NOTE — DISCHARGE NOTE PROVIDER - NSDCCPCAREPLAN_GEN_ALL_CORE_FT
PRINCIPAL DISCHARGE DIAGNOSIS  Diagnosis: Choledocholithiasis  Assessment and Plan of Treatment: PRINCIPAL DISCHARGE DIAGNOSIS  Diagnosis: Choledocholithiasis  Assessment and Plan of Treatment: You came with infection of your panceras, found to have 1.2 cm stone in distal cbd, had ERCP done with stone removal, you were given zosyn here, stable to be dischrged to home with cipro and flagyll x 6 days, need to f/up pcp in 1 week

## 2019-03-18 NOTE — DISCHARGE NOTE PROVIDER - HOSPITAL COURSE
83 female from home, lives with family, with PMHx of HTN, HLD and PE ( completed 1 year of Eliquis) presented to ED with c/o of nausea and vomiting since 1 day, admitted for acute pancreatitis, choledocholithiasis, 1.2 cm distal cbd stone. Initially LFts wnl, later started trending up, patient had fevers, concern for possible cholangitis?, given zosyn. Labs improved with NPO, IVF, antibiotics. Blood cx--------------, Seen by GI, ID specialist. ERCP shows-------------------. For HTN home meds kept on hold as BP was borderline. For fecal impaction, fleet enema given. 83 female from home, lives with family, with PMHx of HTN, HLD and PE ( completed 1 year of Eliquis) presented to ED with c/o of nausea and vomiting since 1 day, admitted for acute pancreatitis, choledocholithiasis, 1.2 cm distal cbd stone. Initially LFts wnl, later started trending up, patient had fevers, concern for possible cholangitis?, given zosyn. Labs improved with NPO, IVF, antibiotics. Blood cx negative, Seen by GI, ID specialist. ERCP done with removal of stone and sphincterectomy, labs normalized. . For HTN home meds kept on hold as BP was borderline. For fecal impaction, fleet enema given. Patient will be discharging with cipro and flagyll x 6 days to complete the treatment for 10 days. Outpatient f/up with pcpc in 1 week, plan discussed with attending.

## 2019-03-18 NOTE — PROGRESS NOTE ADULT - ASSESSMENT
83 female from home, lives with family, with PMHx of HTN, HLD and PE ( completed 1 year of Eliquis) presented to ED with c/o of nausea and vomiting since 1 day, admitted for acute pancreatitis, choledocholithiasis, 1.2 cm distal cbd stone, currently pain improved.       Problem: Pancreatitis, acute  - Lipase 517, Alk phos 127, ALT 38, AST 78, normal Bili--->currently labs slightly improving   - MRCP reviewed   - UA neg for infection   - CT abd showed  Mild central intrahepatic biliary ductal dilatation  - Jose D MILD pancreatitis 2/2 CB stone   -fever, wbc count, on zosyn day 2  - continue with gentle Hydration   - NPO  -pain control   -Blood cx f/up  - pt will need ERCP  -patient noted to have elevated procalcitonin  - GI consult Dr Garza    Problem: HTN (hypertension)  Assessment and Plan: hold home meds as BP borderline     Problem: HLD (hyperlipidemia)  Assessment and Plan:  Lipid panel wnl  old atorvastatin for now given elevated lfts     Problem: Prophylactic measure  Assessment and Plan: lovenox for Dvt prophylaxis.  protonix for GI ppx as has duodenitis     Problem: fecal impaction:  Will give fleet enema 83 female from home, lives with family, with PMHx of HTN, HLD and PE ( completed 1 year of Eliquis) presented to ED with c/o of nausea and vomiting since 1 day, admitted for acute pancreatitis, choledocholithiasis, 1.2 cm distal cbd stone, currently pain improved.       Problem: Pancreatitis, acute  - Lipase 517, Alk phos 127, ALT 38, AST 78, normal Bili--->currently labs slightly improving   - MRCP reviewed   - UA neg for infection   - CT abd showed  Mild central intrahepatic biliary ductal dilatation  - Jose D MILD pancreatitis 2/2 CB stone   -fever, wbc count, on zosyn day 2  - continue with gentle Hydration   - NPO  -pain control   -Blood cx f/up  - Plan for ERCP in am, NPO past midnight  -patient noted to have elevated procalcitonin  - GI consult Dr Garza    Problem: HTN (hypertension)  Assessment and Plan: hold home meds as BP borderline     Problem: HLD (hyperlipidemia)  Assessment and Plan:  Lipid panel wnl  old atorvastatin for now given elevated lfts     Problem: Prophylactic measure  Assessment and Plan: lovenox for Dvt prophylaxis.  protonix for GI ppx as has duodenitis     Problem: fecal impaction:  Will give fleet enema

## 2019-03-18 NOTE — PROGRESS NOTE ADULT - ASSESSMENT
1. Pancreatitis  2. CBD stone  3. ? cholangitis    Suggestions:    1. Antibiotics IV  2. ERCP  3. Avoid NSAID  4. Protonix daily  5. DVT prophylaxis

## 2019-03-18 NOTE — PROGRESS NOTE ADULT - SUBJECTIVE AND OBJECTIVE BOX
Patient is a 83y old  Female who presents with a chief complaint of nausea and vomiting (18 Mar 2019 08:27)      INTERVAL HPI/OVERNIGHT EVENTS: denies any pain, low grade fever to 100.1.     MEDICATIONS  (STANDING):  enoxaparin Injectable 40 milliGRAM(s) SubCutaneous daily  pantoprazole  Injectable 40 milliGRAM(s) IV Push daily  piperacillin/tazobactam IVPB. 3.375 Gram(s) IV Intermittent every 8 hours  saline laxative (FLEET) Rectal Enema 1 Enema Rectal once  sodium chloride 0.9%. 1000 milliLiter(s) (100 mL/Hr) IV Continuous <Continuous>    MEDICATIONS  (PRN):  acetaminophen   Tablet .. 650 milliGRAM(s) Oral every 6 hours PRN Mild Pain (1 - 3), Moderate Pain (4 - 6)  ondansetron Injectable 4 milliGRAM(s) IV Push every 6 hours PRN Nausea and/or Vomiting      Allergies    No Known Drug Allergies  Seafood (Hives)    Intolerances      __________________________________________________  REVIEW OF SYSTEMS:    CONSTITUTIONAL: No fever,   EYES: no acute visual disturbances  NECK: No pain or stiffness  RESPIRATORY: No cough; No shortness of breath  CARDIOVASCULAR: No chest pain, no palpitations  GASTROINTESTINAL: No pain. No nausea or vomiting; No diarrhea   NEUROLOGICAL: No headache or numbness, no tremors  HEME/LYMPH: No  bleeding gums    Vital Signs Last 24 Hrs  T(C): 37.5 (18 Mar 2019 05:54), Max: 37.8 (18 Mar 2019 04:36)  T(F): 99.5 (18 Mar 2019 05:54), Max: 100.1 (18 Mar 2019 04:36)  HR: 80 (18 Mar 2019 04:36) (64 - 82)  BP: 106/44 (18 Mar 2019 04:36) (92/41 - 111/49)  BP(mean): --  RR: 16 (18 Mar 2019 04:36) (16 - 18)  SpO2: 95% (18 Mar 2019 04:36) (93% - 95%)    ________________________________________________  PHYSICAL EXAM:  GENERAL: NAD,   HEAD:  , Normocephalic  EYES:  conjunctiva and sclera clear  NECK: Supple, No JVD    NERVOUS SYSTEM:  Alert & Oriented X3,   CHEST/LUNG: Clear to auscuitation bilaterally;   HEART: S1 S2+;   ABDOMEN: Soft, Nontender, Nondistended; Bowel sounds present  EXTREMITIES: no cyanosis; no edema; no calf tenderness    _________________________________________________  LABS:                                     10.9   19.87 )-----------( 146      ( 17 Mar 2019 12:47 )             33.6     03-18    135  |  104  |  15  ----------------------------<  94  3.5   |  26  |  0.89    Ca    8.4      18 Mar 2019 07:33  Phos  2.5     03-18  Mg     2.2     03-18    TPro  6.6  /  Alb  3.0<L>  /  TBili  1.9<H>  /  DBili  1.0<H>  /  AST  96<H>  /  ALT  131<H>  /  AlkPhos  145<H>  03-18    PT/INR - ( 18 Mar 2019 07:33 )   PT: 16.3 sec;   INR: 1.45 ratio

## 2019-03-18 NOTE — PROGRESS NOTE ADULT - SUBJECTIVE AND OBJECTIVE BOX
[   ] ICU                                          [   ] CCU                                      [ X ] Medical Floor    Patient is comfortable. No new complaints reported, No abdominal pain, N/V, hematemesis, hematochezia, melena, fever, chills, chest pain, SOB, cough or diarrhea reported.    VITALS  Vital Signs Last 24 Hrs  T(C): 37.5 (18 Mar 2019 05:54), Max: 37.8 (18 Mar 2019 04:36)  T(F): 99.5 (18 Mar 2019 05:54), Max: 100.1 (18 Mar 2019 04:36)  HR: 80 (18 Mar 2019 04:36) (64 - 82)  BP: 106/44 (18 Mar 2019 04:36) (92/41 - 111/49)   RR: 16 (18 Mar 2019 04:36) (16 - 18)  SpO2: 95% (18 Mar 2019 04:36) (93% - 95%)       MEDICATIONS  (STANDING):  enoxaparin Injectable 40 milliGRAM(s) SubCutaneous daily  pantoprazole  Injectable 40 milliGRAM(s) IV Push daily  piperacillin/tazobactam IVPB. 3.375 Gram(s) IV Intermittent every 8 hours  saline laxative (FLEET) Rectal Enema 1 Enema Rectal once  sodium chloride 0.9%. 1000 milliLiter(s) (100 mL/Hr) IV Continuous <Continuous>    MEDICATIONS  (PRN):  ketorolac   Injectable 15 milliGRAM(s) IV Push every 4 hours PRN Moderate Pain (4 - 6)  ondansetron Injectable 4 milliGRAM(s) IV Push every 6 hours PRN Nausea and/or Vomiting                            11.2   9.60  )-----------( 130      ( 18 Mar 2019 07:33 )             35.0       03-18    135  |  104  |  15  ----------------------------<  94  3.5   |  26  |  0.89    Ca    8.4      18 Mar 2019 07:33  Phos  2.5     03-18  Mg     2.2     03-18    TPro  6.6  /  Alb  3.0<L>  /  TBili  1.9<H>  /  DBili  1.0<H>  /  AST  96<H>  /  ALT  131<H>  /  AlkPhos  145<H>  03-18      PT/INR - ( 18 Mar 2019 07:33 )   PT: 16.3 sec;   INR: 1.45 ratio         PTT - ( 18 Mar 2019 07:33 )  PTT:27.6 sec

## 2019-03-19 LAB
ALBUMIN SERPL ELPH-MCNC: 2.8 G/DL — LOW (ref 3.5–5)
ALP SERPL-CCNC: 127 U/L — HIGH (ref 40–120)
ALT FLD-CCNC: 84 U/L DA — HIGH (ref 10–60)
ANION GAP SERPL CALC-SCNC: 5 MMOL/L — SIGNIFICANT CHANGE UP (ref 5–17)
AST SERPL-CCNC: 44 U/L — HIGH (ref 10–40)
BILIRUB SERPL-MCNC: 0.8 MG/DL — SIGNIFICANT CHANGE UP (ref 0.2–1.2)
BUN SERPL-MCNC: 12 MG/DL — SIGNIFICANT CHANGE UP (ref 7–18)
CALCIUM SERPL-MCNC: 8.4 MG/DL — SIGNIFICANT CHANGE UP (ref 8.4–10.5)
CHLORIDE SERPL-SCNC: 106 MMOL/L — SIGNIFICANT CHANGE UP (ref 96–108)
CO2 SERPL-SCNC: 26 MMOL/L — SIGNIFICANT CHANGE UP (ref 22–31)
CREAT SERPL-MCNC: 0.82 MG/DL — SIGNIFICANT CHANGE UP (ref 0.5–1.3)
GLUCOSE BLDC GLUCOMTR-MCNC: 103 MG/DL — HIGH (ref 70–99)
GLUCOSE BLDC GLUCOMTR-MCNC: 106 MG/DL — HIGH (ref 70–99)
GLUCOSE BLDC GLUCOMTR-MCNC: 141 MG/DL — HIGH (ref 70–99)
GLUCOSE SERPL-MCNC: 90 MG/DL — SIGNIFICANT CHANGE UP (ref 70–99)
HCT VFR BLD CALC: 35.3 % — SIGNIFICANT CHANGE UP (ref 34.5–45)
HGB BLD-MCNC: 11.3 G/DL — LOW (ref 11.5–15.5)
INR BLD: 1.22 RATIO — HIGH (ref 0.88–1.16)
MAGNESIUM SERPL-MCNC: 2.2 MG/DL — SIGNIFICANT CHANGE UP (ref 1.6–2.6)
MCHC RBC-ENTMCNC: 27.8 PG — SIGNIFICANT CHANGE UP (ref 27–34)
MCHC RBC-ENTMCNC: 32 GM/DL — SIGNIFICANT CHANGE UP (ref 32–36)
MCV RBC AUTO: 86.7 FL — SIGNIFICANT CHANGE UP (ref 80–100)
NRBC # BLD: 0 /100 WBCS — SIGNIFICANT CHANGE UP (ref 0–0)
PHOSPHATE SERPL-MCNC: 2.5 MG/DL — SIGNIFICANT CHANGE UP (ref 2.5–4.5)
PLATELET # BLD AUTO: 142 K/UL — LOW (ref 150–400)
POTASSIUM SERPL-MCNC: 3.7 MMOL/L — SIGNIFICANT CHANGE UP (ref 3.5–5.3)
POTASSIUM SERPL-SCNC: 3.7 MMOL/L — SIGNIFICANT CHANGE UP (ref 3.5–5.3)
PROT SERPL-MCNC: 6.5 G/DL — SIGNIFICANT CHANGE UP (ref 6–8.3)
PROTHROM AB SERPL-ACNC: 13.6 SEC — HIGH (ref 10–12.9)
RBC # BLD: 4.07 M/UL — SIGNIFICANT CHANGE UP (ref 3.8–5.2)
RBC # FLD: 13.4 % — SIGNIFICANT CHANGE UP (ref 10.3–14.5)
SODIUM SERPL-SCNC: 137 MMOL/L — SIGNIFICANT CHANGE UP (ref 135–145)
WBC # BLD: 3.91 K/UL — SIGNIFICANT CHANGE UP (ref 3.8–10.5)
WBC # FLD AUTO: 3.91 K/UL — SIGNIFICANT CHANGE UP (ref 3.8–10.5)

## 2019-03-19 RX ORDER — SODIUM CHLORIDE 9 MG/ML
1000 INJECTION, SOLUTION INTRAVENOUS
Qty: 0 | Refills: 0 | Status: DISCONTINUED | OUTPATIENT
Start: 2019-03-19 | End: 2019-03-20

## 2019-03-19 RX ORDER — INDOMETHACIN 50 MG
100 CAPSULE ORAL ONCE
Qty: 0 | Refills: 0 | Status: COMPLETED | OUTPATIENT
Start: 2019-03-19 | End: 2019-03-19

## 2019-03-19 RX ORDER — SODIUM CHLORIDE 9 MG/ML
1000 INJECTION, SOLUTION INTRAVENOUS
Qty: 0 | Refills: 0 | Status: DISCONTINUED | OUTPATIENT
Start: 2019-03-19 | End: 2019-03-19

## 2019-03-19 RX ADMIN — PIPERACILLIN AND TAZOBACTAM 25 GRAM(S): 4; .5 INJECTION, POWDER, LYOPHILIZED, FOR SOLUTION INTRAVENOUS at 02:36

## 2019-03-19 RX ADMIN — SODIUM CHLORIDE 60 MILLILITER(S): 9 INJECTION, SOLUTION INTRAVENOUS at 05:37

## 2019-03-19 RX ADMIN — PANTOPRAZOLE SODIUM 40 MILLIGRAM(S): 20 TABLET, DELAYED RELEASE ORAL at 11:14

## 2019-03-19 RX ADMIN — PIPERACILLIN AND TAZOBACTAM 25 GRAM(S): 4; .5 INJECTION, POWDER, LYOPHILIZED, FOR SOLUTION INTRAVENOUS at 17:24

## 2019-03-19 RX ADMIN — PIPERACILLIN AND TAZOBACTAM 25 GRAM(S): 4; .5 INJECTION, POWDER, LYOPHILIZED, FOR SOLUTION INTRAVENOUS at 09:30

## 2019-03-19 RX ADMIN — Medication 100 MILLIGRAM(S): at 14:00

## 2019-03-19 NOTE — PROGRESS NOTE ADULT - ASSESSMENT
83 female from home, lives with family, with PMHx of HTN, HLD and PE ( completed 1 year of Eliquis) presented to ED with c/o of nausea and vomiting since 1 day, admitted for acute pancreatitis, choledocholithiasis, 1.2 cm distal cbd stone, currently pain improved.       Problem: Pancreatitis, acute  - Lipase 517, Alk phos 127, ALT 38, AST 78, normal Bili--->currently labs slightly improving   - MRCP reviewed   - CT abd showed  Mild central intrahepatic biliary ductal dilatation  -fever, wbc count, on zosyn day 3  - continue with gentle Hydration   - NPO  -pain control   -Blood cx negative  - Plan for ERCP today  -patient noted to have elevated procalcitonin, CXR with interstitial prominence bilaterally may be related to shallow inspiration; an element of interstitial edema cannot be excluded. Linear   scarlike opacity is noted within the periphery of the left midlung field.   - GI consult Dr Garza    Problem: HTN (hypertension)  Assessment and Plan: hold home meds as BP borderline, will restart once BP normalizes     Problem: HLD (hyperlipidemia)  Assessment and Plan:  Lipid panel wnl  old atorvastatin for now given elevated lfts     Problem: Prophylactic measure  Assessment and Plan: lovenox for Dvt prophylaxis.  protonix for GI ppx as has duodenitis     Problem: fecal impaction:  Will give fleet enema 83 female from home, lives with family, with PMHx of HTN, HLD and PE ( completed 1 year of Eliquis) presented to ED with c/o of nausea and vomiting since 1 day, admitted for acute pancreatitis, choledocholithiasis, 1.2 cm distal cbd stone, currently pain improved.       Problem: Pancreatitis, acute  - Lipase 517, Alk phos 127, ALT 38, AST 78, normal Bili--->currently labs improving   - MRCP reviewed   - CT abd showed  Mild central intrahepatic biliary ductal dilatation  -fever, wbc count, on zosyn day 3  - continue with gentle Hydration   - NPO  -pain control   -Blood cx negative  - Plan for ERCP today  -patient noted to have elevated procalcitonin, CXR with interstitial prominence bilaterally may be related to shallow inspiration; an element of interstitial edema cannot be excluded. Linear scarlike opacity is noted within the periphery of the left midlung field.   - GI consult Dr Garza    Problem: HTN (hypertension)  Assessment and Plan: hold home meds as BP borderline, will restart once BP normalizes     Problem: HLD (hyperlipidemia)  Assessment and Plan:  Lipid panel wnl  old atorvastatin for now given elevated lfts     Problem: Prophylactic measure  Assessment and Plan: lovenox for Dvt prophylaxis.  protonix for GI ppx as has duodenitis     Problem: fecal impaction:  Will give fleet enema, if not improve then need another enema, mg citrate

## 2019-03-19 NOTE — PROGRESS NOTE ADULT - SUBJECTIVE AND OBJECTIVE BOX
Meds:  piperacillin/tazobactam IVPB. 3.375 Gram(s) IV Intermittent every 8 hours    Allergies:  Allergies    No Known Drug Allergies  Seafood (Hives)    Intolerances        ROS  [  ] UNABLE TO ELICIT    General:  [  ] None  [  ] Fever  [ x ] Chills  [ x ] Malaise    Skin:  [ x ] None [  ] Rash  [  ] Wound  [  ] Ulcer    HEENT:  [ x ] None  [  ] Sore Throat  [  ] Nasal congestion/ runny nose  [  ] Photophobia [  ] Neck pain      Chest:  [ x ] None   [  ] SOB  [  ] Cough  [  ] None    Cardiovascular:   [ x ] None  [  ] CP  [  ] Palpitation    Gastrointestinal:  [  ] None  [ x ] Abd pain   [ x ] Nausea    [ x ] Vomiting   [  ] Diarrhea	     Genitourinary:  [ x ] None [  ] Polyuria   [  ] Urgency  [  ] Frequency  [  ] Dysuria    [  ]  Hematuria       Musculoskeletal:  [  ] None [  ] Back Pain	[  ] Body aches  [  ] Joint pain  [ x ] Weakness    Neurological: [  ] None [  ]Dizziness  [  ]Visual Disturbance  [  ]Headaches   [ x ] Weakness        PHYSICAL EXAM:  Vital Signs Last 24 Hrs  T(C): 37.3 (19 Mar 2019 04:45), Max: 38.1 (18 Mar 2019 14:01)  T(F): 99.2 (19 Mar 2019 04:45), Max: 100.6 (18 Mar 2019 14:01)  HR: 68 (19 Mar 2019 04:45) (64 - 78)  BP: 126/65 (19 Mar 2019 04:45) (116/48 - 126/65)  BP(mean): --  RR: 16 (19 Mar 2019 04:45) (16 - 17)  SpO2: 98% (19 Mar 2019 05:56) (93% - 99%)    Constitutional:    HEENT: [ x ] Wnl  [  ] Pharyngeal congestion    Neck:  [ x ] Supple  [  ]Lymphadenopathy  [ x ] No JVD   [  ] JVD  [  ] Masses   [  ] WNL    CHEST/Respiratory:  [ x ]Clear to auscultation  [  ] Rales   [  ] Rhonchi   [  ] Wheezing     [  ] Chest Tenderness      Cardiovascular:  [ x ] Reg S1 S2   [  ] Irreg S1 S2   [ x ]No Murmur  [  ] +ve Murmurs  [  ]Systolic [  ]Diastolic      Abdomen:  [ x ] Soft  [ x ] No tendrerness  [  ] Tenderness  [  ] Organomegaly  [  ] ABD Distention  [  ] Rigidity                       [ x ] No Regidity                       [ x ] No Rebound Tenderness  [ x ] No Guarding Rigidity  [  ] Rebound Tenderness[  ] Guarding Rigidity                          [ x ]  +ve Bowel Sounds  [  ] Decreased Bowel Sounds    [  ] Absent Bowel Sounds                            Extremities: [ x ] No edema [  ] Edema  [  ] Clubbing   [  ] Cyanosis                         [ x ] No Tender Calf muscles  [  ] Tender Calf muscles                        [ x ] Palpable peripheral pulses    Neurological: [ x ] Awake  [ x ] Alert  [ x ] Oriented  x  3                           [  ] Confused  [  ] Drowsy  [  ] respond to painful stimuli  [  ] Unresponsive    Skin:  [ x ] Intact [  ] Redness [  ] Thrombophlebitis  [  ] Rashes  [  ] Dry  [  ] Ulcers    Ortho:  [  ] Joint Swelling  [  ] Joint erythema [  ] Joint tenderness                [  ] Increased temp. to touch  [  ] DJD [ x ] WNL            LABS/DIAGNOSTIC TESTS                        11.3   3.91  )-----------( 142      ( 19 Mar 2019 08:59 )             35.3   03-19    137  |  106  |  12  ----------------------------<  90  3.7   |  26  |  x     Ca    8.4      19 Mar 2019 08:59  Phos  2.5     03-19  Mg     2.2     03-19    TPro  x   /  Alb  2.8<L>  /  TBili  x   /  DBili  x   /  AST  x   /  ALT  x   /  AlkPhos  x   03-19          CULTURES:   Culture - Blood (03.17.19 @ 22:36)    Specimen Source: .Blood    Culture Results:   No growth to date.    Culture - Blood (03.17.19 @ 22:36)    Specimen Source: .Blood    Culture Results:   No growth to date.      Culture - Urine (03.17.19 @ 13:00)    Specimen Source: .Urine    Culture Results:   <10,000 CFU/mL Normal Urogenital [place]        RADIOLOGY    EXAM:  MR MRCP [facility] IC                            PROCEDURE DATE:  03/17/2019          INTERPRETATION:  MRCP without contrast    Indication: Right upper quadrant abdominal pain.    Technique: Utilizing a 1.5 Lily high-field magnet, multiplanar   multisequence MR images of the abdomen are acquired without IV contrast,   supplemented by thin and thick slab MRCP images. IV contrast is not   administered due to patient's request to stop the examination.    Comparison: No prior abdominal MR is available for comparison.    Findings: No evidence for a gallstone in the gallbladder. Nonspecific   trace pericholecystic fluid.    There is an apparent 1.2 cm filling defect in the distal common bile duct   (image 80 series 4) with associated upstream common bile duct dilatation   at 1.0 cm in caliber. The filling defect has angular margins. The main   pancreatic duct maintains normal caliber without dilatation.    Multiple tiny brightly T2 hyperintense lesions are seen in the pancreas   measuring upto approximately 3 mm. These may represent nonspecific   cystic neoplasms of the pancreas or IPMN. The pancreas is atrophic.    There is a nonspecific 6 mm brightly T2 hyperintense lesion in the   spleen. This may represent a nonspecific cyst or hemangioma. Further   evaluation is difficult without IV contrast.    Multiple brightly T2 hyperintense lesions are seen in the kidneys   bilaterally measuring up to 3.0 cm on the left, representing probable   cysts. The adrenals appear unremarkable.    No evidence for ascites, or enlarged lymph node.    Impression: No evidence for a gallstone in the gallbladder. Nonspecific   trace pericholecystic fluid.    Apparent 1.2 cm filling defect in the distal common bile duct with   associated upstream common bile duct dilatation at 1.0 cm in caliber. The   filling defect may represent a common bile duct stone or a   mass/cholangiocarcinoma. Further differentiation is difficult without IV   contrast. A common bile duct stone is favored due to its angularmargins.   ERCP is recommended for further evaluation.    Other findings as above.    A preliminary report was provided by [facility].      Assessment and Recommendation:  Patient is a 83 female from home, lives with family, with PMHx of HTN, HLD and PE ( completed 1 year of Eliquis) presented to ED with c/o of nausea and vomiting since 1 day. history obtained from daughter at bed side. Patient complained of nausea and multiple episodes ( 6-7) NBNB vomiting since yesterday. patient also complains of RUQ and epigastric pain, 5/10 intensity, radiating to back along with chills.  Hospital work up suggested Pancreatitis and dilated, and obsctructed CBD.  Patient was admitted and was started on IV Zosyn.  3/18/19 Patient is febrile  and WBC became normal and patient feels much better today, also liver enzymes are improving.  3/19/19 Patient is febrile T. Max 100.6 but deny GI symptoms and WBC stayed normal.    PROBLEMS AND PLAN:  # ACUTE PANCREATITIS, POSSIBLE CHOLECYSTITIS.  1- UA & CS suggested contamination.  2- Follow Blood culture to final report are still -VE.  3- GI management and follow up.  4- Continue IV Zosyn.  5- Fluid and electrolytes management.  6- CBC and CMP follow up.   7- Observe for leukocytosis.     # HTN.  1- Monitor Blood pressure closely.  2- Blood pressure control.  3- BP. meds as per cardiology and primary care team.       Discussed with medical resident.

## 2019-03-19 NOTE — PROGRESS NOTE ADULT - SUBJECTIVE AND OBJECTIVE BOX
Time of Visit:  Patient seen and examined.     MEDICATIONS  (STANDING):  dextrose 5% + sodium chloride 0.9%. 1000 milliLiter(s) (60 mL/Hr) IV Continuous <Continuous>  enoxaparin Injectable 40 milliGRAM(s) SubCutaneous daily  insulin lispro (HumaLOG) corrective regimen sliding scale   SubCutaneous every 6 hours  pantoprazole  Injectable 40 milliGRAM(s) IV Push daily  piperacillin/tazobactam IVPB. 3.375 Gram(s) IV Intermittent every 8 hours  saline laxative (FLEET) Rectal Enema 1 Enema Rectal once      MEDICATIONS  (PRN):  acetaminophen   Tablet .. 650 milliGRAM(s) Oral every 6 hours PRN Temp greater or equal to 38C (100.4F), Mild Pain (1 - 3)  ketorolac   Injectable 15 milliGRAM(s) IV Push every 4 hours PRN Moderate Pain (4 - 6)  ondansetron Injectable 4 milliGRAM(s) IV Push every 6 hours PRN Nausea and/or Vomiting       Medications up to date at time of exam.    ROS; + fever temp 100.6. No chills, hypoxia, congestion, cough.   PHYSICAL EXAMINATION:  Vital Signs Last 24 Hrs  T(C): 37.6 (19 Mar 2019 13:18), Max: 38.1 (18 Mar 2019 14:01)  T(F): 99.6 (19 Mar 2019 13:18), Max: 100.6 (18 Mar 2019 14:01)  HR: 71 (19 Mar 2019 13:18) (64 - 78)  BP: 139/64 (19 Mar 2019 13:18) (116/48 - 139/64)  BP(mean): --  RR: 18 (19 Mar 2019 13:18) (16 - 18)  SpO2: 96% (19 Mar 2019 13:18) (93% - 99%)   (if applicable)    General: Alert and oriented. No acute distress.     HEENT: Head is normocephalic and atraumatic. No nasal tenderness.  Extraocular muscles are intact. Mucous membranes are moist.     NECK: Supple, no palpable adenopathy.    LUNGS: Clear to auscultation, no wheezing, rales, or rhonchi. No use of accessory muscle.     HEART: S1 S2 Regular rate and no click/ rub.     ABDOMEN: Soft, nontender, and nondistended.  No hepatosplenomegaly is noted. Active bowel sounds.     EXTREMITIES: Without any cyanosis, clubbing, rash, lesions or edema.    NEUROLOGIC: Awake, alert, oriented.     SKIN: Warm and moist. Non diaphoretic.      LABS:                        11.3   3.91  )-----------( 142      ( 19 Mar 2019 08:59 )             35.3     03-19    137  |  106  |  12  ----------------------------<  90  3.7   |  26  |  0.82    Ca    8.4      19 Mar 2019 08:59  Phos  2.5     03-19  Mg     2.2     03-19    TPro  6.5  /  Alb  2.8<L>  /  TBili  0.8  /  DBili  x   /  AST  44<H>  /  ALT  84<H>  /  AlkPhos  127<H>  03-19    PT/INR - ( 19 Mar 2019 08:59 )   PT: 13.6 sec;   INR: 1.22 ratio         PTT - ( 18 Mar 2019 07:33 )  PTT:27.6 sec    Procalcitonin, Serum: 16.50 ng/mL (03-17-19 @ 21:30)      RADIOLOGY & ADDITIONAL STUDIES:  EKG:   CXR:< from: Xray Chest 1 View- PORTABLE-Routine (03.18.19 @ 12:01) >  PROCEDURE DATE:  03/18/2019          INTERPRETATION:  INDICATION: Cough, abdominal pain    PRIORS: 4/17/2018    VIEWS: Portable AP radiography of the chest performed. Evaluation limited  secondary to shallow inspiration and portable technique.    FINDINGS: Heart size appears within normal limits. No definite hilar or   superior mediastinal abnormalities noted. Interstitial prominence   bilaterally may be related to shallow inspiration; an element of   interstitial edema cannot be excluded. Linear scarlike opacity is noted   within the periphery of the left midlung field. No pleural effusion or   pneumothorax is demonstrated. No mediastinal shift is noted. No acute   osseous fractures are identified.      IMPRESSION: Interstitial prominence bilaterally may be related to shallow   inspiration; an element of interstitial edema cannot be excluded. Linear   scarlike opacity is noted within the periphery of the left midlung field.       IMPRESSION: 83y Female PAST MEDICAL & SURGICAL HISTORY:  Pulmonary emboli  GERD (gastroesophageal reflux disease)  HTN (hypertension)  S/P appendectomy    Impression: 84 Y/O Female with prior mentioned multiple chronic conditions. Presented with nausea, vomiting, SOB secondary to Mild Pulmonary Edema, Acute Pancreatitis.      Suggestion;  O2 saturation 96% room air. Can have oxygen supplementation if needed .  Continue antibiotic as per ID recommendations.   GI follow up.

## 2019-03-19 NOTE — PROGRESS NOTE ADULT - SUBJECTIVE AND OBJECTIVE BOX
Patient is a 83y old  Female who presents with a chief complaint of nausea and vomiting (19 Mar 2019 07:57)    pt seen in icu [  ], reg med floor [ x  ], bed [  ], chair at bedside [ x  ], a+o x3 [x  ], lethargic [  ],  nad [x  ]      Allergies    No Known Drug Allergies  Seafood (Hives)        Vitals    T(F): 99.2 (03-19-19 @ 04:45), Max: 100.6 (03-18-19 @ 14:01)  HR: 68 (03-19-19 @ 04:45) (64 - 78)  BP: 126/65 (03-19-19 @ 04:45) (116/48 - 126/65)  RR: 16 (03-19-19 @ 04:45) (16 - 17)  SpO2: 98% (03-19-19 @ 05:56) (93% - 99%)  Wt(kg): --  CAPILLARY BLOOD GLUCOSE      POCT Blood Glucose.: 106 mg/dL (19 Mar 2019 06:00)      Labs                          11.2   9.60  )-----------( 130      ( 18 Mar 2019 07:33 )             35.0       03-18    135  |  104  |  15  ----------------------------<  94  3.5   |  26  |  0.89    Ca    8.4      18 Mar 2019 07:33  Phos  2.5     03-18  Mg     2.2     03-18    TPro  6.6  /  Alb  3.0<L>  /  TBili  1.9<H>  /  DBili  1.0<H>  /  AST  96<H>  /  ALT  131<H>  /  AlkPhos  145<H>  03-18            .Blood  03-17 @ 22:36   No growth to date.  --  --      .Urine  03-17 @ 13:00   <10,000 CFU/mL Normal Urogenital [place]  --  --          Radiology Results      Meds    MEDICATIONS  (STANDING):  dextrose 5% + sodium chloride 0.9%. 1000 milliLiter(s) (60 mL/Hr) IV Continuous <Continuous>  enoxaparin Injectable 40 milliGRAM(s) SubCutaneous daily  insulin lispro (HumaLOG) corrective regimen sliding scale   SubCutaneous every 6 hours  pantoprazole  Injectable 40 milliGRAM(s) IV Push daily  piperacillin/tazobactam IVPB. 3.375 Gram(s) IV Intermittent every 8 hours  saline laxative (FLEET) Rectal Enema 1 Enema Rectal once      MEDICATIONS  (PRN):  acetaminophen   Tablet .. 650 milliGRAM(s) Oral every 6 hours PRN Temp greater or equal to 38C (100.4F), Mild Pain (1 - 3)  ketorolac   Injectable 15 milliGRAM(s) IV Push every 4 hours PRN Moderate Pain (4 - 6)  ondansetron Injectable 4 milliGRAM(s) IV Push every 6 hours PRN Nausea and/or Vomiting      Physical Exam      Neuro :  no focal deficits  Respiratory: CTA B/L  CV: RRR, S1S2, no murmurs,   Abdominal: Soft, NT, ND +BS,  Extremities: No edema, + peripheral pulses    ASSESSMENT    Abdominal pain 2nd tp acute pancreatitis  choledocholithiasis   r/o cholangitis  gerd  duodenitis,  fecal impaction  h/o hearing impaired,   Pulmonary emboli  GERD (gastroesophageal reflux disease)  HTN (hypertension)  S/P appendectomy  No significant past surgical history      PLAN      gi f/u  s/p Fleet enema x 2 half hour apart  s/p Citrate magnesium on bottle (8Oz) po half hour after second enema  Lipase wnl noted   LFT's improving  mrcp with distal cbd stone noted   npo  ercp as per gi  cont protonix  cont zosyn  id f/u  blood cx neg noted above  pulm f/u  cont current meds

## 2019-03-19 NOTE — PROGRESS NOTE ADULT - SUBJECTIVE AND OBJECTIVE BOX
Patient is a 83y old  Female who presents with a chief complaint of nausea and vomiting (18 Mar 2019 15:15)      INTERVAL HPI/OVERNIGHT EVENTS: no new complaints, sititng comfortably in chair, no fever spike since yesterday     MEDICATIONS  (STANDING):  dextrose 5% + sodium chloride 0.9%. 1000 milliLiter(s) (60 mL/Hr) IV Continuous <Continuous>  enoxaparin Injectable 40 milliGRAM(s) SubCutaneous daily  insulin lispro (HumaLOG) corrective regimen sliding scale   SubCutaneous every 6 hours  pantoprazole  Injectable 40 milliGRAM(s) IV Push daily  piperacillin/tazobactam IVPB. 3.375 Gram(s) IV Intermittent every 8 hours  saline laxative (FLEET) Rectal Enema 1 Enema Rectal once    MEDICATIONS  (PRN):  acetaminophen   Tablet .. 650 milliGRAM(s) Oral every 6 hours PRN Temp greater or equal to 38C (100.4F), Mild Pain (1 - 3)  ketorolac   Injectable 15 milliGRAM(s) IV Push every 4 hours PRN Moderate Pain (4 - 6)  ondansetron Injectable 4 milliGRAM(s) IV Push every 6 hours PRN Nausea and/or Vomiting      Allergies    No Known Drug Allergies  Seafood (Hives)    Intolerances      __________________________________________________  REVIEW OF SYSTEMS:    CONSTITUTIONAL: No fever,   EYES: no acute visual disturbances  NECK: No pain or stiffness  RESPIRATORY: No cough; No shortness of breath  CARDIOVASCULAR: No chest pain, no palpitations  GASTROINTESTINAL: No pain. No nausea or vomiting; No diarrhea   NEUROLOGICAL: No headache or numbness, no tremors  HEME/LYMPH: No  bleeding gums    Vital Signs Last 24 Hrs  T(C): 37.3 (19 Mar 2019 04:45), Max: 38.1 (18 Mar 2019 14:01)  T(F): 99.2 (19 Mar 2019 04:45), Max: 100.6 (18 Mar 2019 14:01)  HR: 68 (19 Mar 2019 04:45) (64 - 78)  BP: 126/65 (19 Mar 2019 04:45) (116/48 - 126/65)  BP(mean): --  RR: 16 (19 Mar 2019 04:45) (16 - 17)  SpO2: 98% (19 Mar 2019 05:56) (93% - 99%)    ________________________________________________  PHYSICAL EXAM:  GENERAL: NAD,   HEAD:  , Normocephalic  EYES:  conjunctiva and sclera clear  NECK: Supple, No JVD    NERVOUS SYSTEM:  Alert & Oriented X3,   CHEST/LUNG: Clear to auscuitation bilaterally;   HEART: S1 S2+;   ABDOMEN: Soft, Nontender, Nondistended; Bowel sounds present  EXTREMITIES: no cyanosis; no edema; no calf tenderness    _________________________________________________  LABS:                  CAPILLARY BLOOD GLUCOSE      POCT Blood Glucose.: 106 mg/dL (19 Mar 2019 06:00)  POCT Blood Glucose.: 72 mg/dL (18 Mar 2019 23:55)  POCT Blood Glucose.: 82 mg/dL (18 Mar 2019 18:07)  POCT Blood Glucose.: 94 mg/dL (18 Mar 2019 12:58)

## 2019-03-20 ENCOUNTER — TRANSCRIPTION ENCOUNTER (OUTPATIENT)
Age: 84
End: 2019-03-20

## 2019-03-20 VITALS
SYSTOLIC BLOOD PRESSURE: 143 MMHG | TEMPERATURE: 98 F | RESPIRATION RATE: 17 BRPM | OXYGEN SATURATION: 96 % | DIASTOLIC BLOOD PRESSURE: 57 MMHG | HEART RATE: 66 BPM

## 2019-03-20 LAB
ALBUMIN SERPL ELPH-MCNC: 2.4 G/DL — LOW (ref 3.5–5)
ALP SERPL-CCNC: 109 U/L — SIGNIFICANT CHANGE UP (ref 40–120)
ALT FLD-CCNC: 59 U/L DA — SIGNIFICANT CHANGE UP (ref 10–60)
ANION GAP SERPL CALC-SCNC: 5 MMOL/L — SIGNIFICANT CHANGE UP (ref 5–17)
AST SERPL-CCNC: 29 U/L — SIGNIFICANT CHANGE UP (ref 10–40)
BASOPHILS # BLD AUTO: 0 K/UL — SIGNIFICANT CHANGE UP (ref 0–0.2)
BASOPHILS NFR BLD AUTO: 0 % — SIGNIFICANT CHANGE UP (ref 0–2)
BILIRUB SERPL-MCNC: 0.6 MG/DL — SIGNIFICANT CHANGE UP (ref 0.2–1.2)
BUN SERPL-MCNC: 10 MG/DL — SIGNIFICANT CHANGE UP (ref 7–18)
CALCIUM SERPL-MCNC: 8 MG/DL — LOW (ref 8.4–10.5)
CHLORIDE SERPL-SCNC: 108 MMOL/L — SIGNIFICANT CHANGE UP (ref 96–108)
CO2 SERPL-SCNC: 27 MMOL/L — SIGNIFICANT CHANGE UP (ref 22–31)
CREAT SERPL-MCNC: 0.71 MG/DL — SIGNIFICANT CHANGE UP (ref 0.5–1.3)
EOSINOPHIL # BLD AUTO: 0.23 K/UL — SIGNIFICANT CHANGE UP (ref 0–0.5)
EOSINOPHIL NFR BLD AUTO: 6 % — SIGNIFICANT CHANGE UP (ref 0–6)
GLUCOSE BLDC GLUCOMTR-MCNC: 109 MG/DL — HIGH (ref 70–99)
GLUCOSE BLDC GLUCOMTR-MCNC: 117 MG/DL — HIGH (ref 70–99)
GLUCOSE BLDC GLUCOMTR-MCNC: 136 MG/DL — HIGH (ref 70–99)
GLUCOSE SERPL-MCNC: 112 MG/DL — HIGH (ref 70–99)
HCT VFR BLD CALC: 31.3 % — LOW (ref 34.5–45)
HGB BLD-MCNC: 10.4 G/DL — LOW (ref 11.5–15.5)
LYMPHOCYTES # BLD AUTO: 0.61 K/UL — LOW (ref 1–3.3)
LYMPHOCYTES # BLD AUTO: 16 % — SIGNIFICANT CHANGE UP (ref 13–44)
MAGNESIUM SERPL-MCNC: 2.1 MG/DL — SIGNIFICANT CHANGE UP (ref 1.6–2.6)
MCHC RBC-ENTMCNC: 28.5 PG — SIGNIFICANT CHANGE UP (ref 27–34)
MCHC RBC-ENTMCNC: 33.2 GM/DL — SIGNIFICANT CHANGE UP (ref 32–36)
MCV RBC AUTO: 85.8 FL — SIGNIFICANT CHANGE UP (ref 80–100)
MONOCYTES # BLD AUTO: 0.57 K/UL — SIGNIFICANT CHANGE UP (ref 0–0.9)
MONOCYTES NFR BLD AUTO: 15 % — HIGH (ref 2–14)
NEUTROPHILS # BLD AUTO: 2.39 K/UL — SIGNIFICANT CHANGE UP (ref 1.8–7.4)
NEUTROPHILS NFR BLD AUTO: 63 % — SIGNIFICANT CHANGE UP (ref 43–77)
PHOSPHATE SERPL-MCNC: 2.8 MG/DL — SIGNIFICANT CHANGE UP (ref 2.5–4.5)
PLATELET # BLD AUTO: 135 K/UL — LOW (ref 150–400)
POTASSIUM SERPL-MCNC: 3.5 MMOL/L — SIGNIFICANT CHANGE UP (ref 3.5–5.3)
POTASSIUM SERPL-SCNC: 3.5 MMOL/L — SIGNIFICANT CHANGE UP (ref 3.5–5.3)
PROT SERPL-MCNC: 5.7 G/DL — LOW (ref 6–8.3)
RBC # BLD: 3.65 M/UL — LOW (ref 3.8–5.2)
RBC # FLD: 13.3 % — SIGNIFICANT CHANGE UP (ref 10.3–14.5)
SODIUM SERPL-SCNC: 140 MMOL/L — SIGNIFICANT CHANGE UP (ref 135–145)
WBC # BLD: 3.79 K/UL — LOW (ref 3.8–10.5)
WBC # FLD AUTO: 3.79 K/UL — LOW (ref 3.8–10.5)

## 2019-03-20 PROCEDURE — 74183 MRI ABD W/O CNTR FLWD CNTR: CPT

## 2019-03-20 PROCEDURE — 81001 URINALYSIS AUTO W/SCOPE: CPT

## 2019-03-20 PROCEDURE — 80053 COMPREHEN METABOLIC PANEL: CPT

## 2019-03-20 PROCEDURE — 83735 ASSAY OF MAGNESIUM: CPT

## 2019-03-20 PROCEDURE — 85027 COMPLETE CBC AUTOMATED: CPT

## 2019-03-20 PROCEDURE — 82962 GLUCOSE BLOOD TEST: CPT

## 2019-03-20 PROCEDURE — 36415 COLL VENOUS BLD VENIPUNCTURE: CPT

## 2019-03-20 PROCEDURE — 83615 LACTATE (LD) (LDH) ENZYME: CPT

## 2019-03-20 PROCEDURE — 80061 LIPID PANEL: CPT

## 2019-03-20 PROCEDURE — 74177 CT ABD & PELVIS W/CONTRAST: CPT

## 2019-03-20 PROCEDURE — C1769: CPT

## 2019-03-20 PROCEDURE — 84145 PROCALCITONIN (PCT): CPT

## 2019-03-20 PROCEDURE — 85730 THROMBOPLASTIN TIME PARTIAL: CPT

## 2019-03-20 PROCEDURE — C1889: CPT

## 2019-03-20 PROCEDURE — 85610 PROTHROMBIN TIME: CPT

## 2019-03-20 PROCEDURE — 83036 HEMOGLOBIN GLYCOSYLATED A1C: CPT

## 2019-03-20 PROCEDURE — 82248 BILIRUBIN DIRECT: CPT

## 2019-03-20 PROCEDURE — 87040 BLOOD CULTURE FOR BACTERIA: CPT

## 2019-03-20 PROCEDURE — 87086 URINE CULTURE/COLONY COUNT: CPT

## 2019-03-20 PROCEDURE — 93005 ELECTROCARDIOGRAM TRACING: CPT

## 2019-03-20 PROCEDURE — 84100 ASSAY OF PHOSPHORUS: CPT

## 2019-03-20 PROCEDURE — 99285 EMERGENCY DEPT VISIT HI MDM: CPT

## 2019-03-20 PROCEDURE — 71045 X-RAY EXAM CHEST 1 VIEW: CPT

## 2019-03-20 PROCEDURE — 83690 ASSAY OF LIPASE: CPT

## 2019-03-20 PROCEDURE — 82977 ASSAY OF GGT: CPT

## 2019-03-20 RX ORDER — OLMESARTAN MEDOXOMIL-HYDROCHLOROTHIAZIDE 25; 40 MG/1; MG/1
1 TABLET, FILM COATED ORAL
Qty: 30 | Refills: 0
Start: 2019-03-20 | End: 2019-04-18

## 2019-03-20 RX ORDER — METRONIDAZOLE 500 MG
1 TABLET ORAL
Qty: 18 | Refills: 0
Start: 2019-03-20 | End: 2019-03-25

## 2019-03-20 RX ORDER — OLMESARTAN MEDOXOMIL-HYDROCHLOROTHIAZIDE 25; 40 MG/1; MG/1
1 TABLET, FILM COATED ORAL
Qty: 0 | Refills: 0 | COMMUNITY

## 2019-03-20 RX ORDER — CIPROFLOXACIN LACTATE 400MG/40ML
1 VIAL (ML) INTRAVENOUS
Qty: 12 | Refills: 0
Start: 2019-03-20 | End: 2019-03-25

## 2019-03-20 RX ADMIN — PIPERACILLIN AND TAZOBACTAM 25 GRAM(S): 4; .5 INJECTION, POWDER, LYOPHILIZED, FOR SOLUTION INTRAVENOUS at 09:10

## 2019-03-20 RX ADMIN — PANTOPRAZOLE SODIUM 40 MILLIGRAM(S): 20 TABLET, DELAYED RELEASE ORAL at 11:53

## 2019-03-20 RX ADMIN — PIPERACILLIN AND TAZOBACTAM 25 GRAM(S): 4; .5 INJECTION, POWDER, LYOPHILIZED, FOR SOLUTION INTRAVENOUS at 02:38

## 2019-03-20 NOTE — DIETITIAN INITIAL EVALUATION ADULT. - MD RECOMMEND
po supplement/Advance diet with nutrition supplement as medically feasible, or may consider alternate nutrition support if NPO prolonged

## 2019-03-20 NOTE — DIETITIAN INITIAL EVALUATION ADULT. - PERTINENT LABORATORY DATA
03-20 Na140 mmol/L Glu 112 mg/dL<H> K+ 3.5 mmol/L Cr  0.71 mg/dL BUN 10 mg/dL 03-20 Phos 2.8 mg/dL 03-20 Alb 2.4 g/dL<L> 03-18 [id number] 5.9 %<H> 03-17 Chol 103 mg/dL LDL 24 mg/dL HDL 64 mg/dL Trig 76 mg/dL

## 2019-03-20 NOTE — DIETITIAN INITIAL EVALUATION ADULT. - OTHER INFO
Patient seen for NPO status >3days. Patient from home lives with family. Patient seen for NPO status >3days. Patient from home lives with family. Visited pt. offered but refused  phone, agreed to PCA/Czech speaking to assist with translation, reports po intake poor x1-2days with abdominal pain & 1-2 episodes of nausea/vomiting PTA, stated UBW possible 123 Lbs? & may have loss few pounds since admission, presently NPO, s/p ERCP on 3/19, followed by GI/team, awaiting clearance to advance diet, d/w RN.

## 2019-03-20 NOTE — DIETITIAN INITIAL EVALUATION ADULT. - FACTORS AFF FOOD INTAKE
weakness, abdominal pain, acute pancreatitis, GERD, choledocholithiasis/persistent nausea/other (specify)/pain/vomiting

## 2019-03-20 NOTE — PROGRESS NOTE ADULT - SUBJECTIVE AND OBJECTIVE BOX
Patient is a 83y old  Female who presents with a chief complaint of nausea and vomiting (19 Mar 2019 10:20)      INTERVAL HPI/OVERNIGHT EVENTS: no new complaints, s/p ercp yesterday, sphincterectomy, stone extraction    MEDICATIONS  (STANDING):  dextrose 5% + sodium chloride 0.9%. 1000 milliLiter(s) (60 mL/Hr) IV Continuous <Continuous>  enoxaparin Injectable 40 milliGRAM(s) SubCutaneous daily  insulin lispro (HumaLOG) corrective regimen sliding scale   SubCutaneous every 6 hours  pantoprazole  Injectable 40 milliGRAM(s) IV Push daily  piperacillin/tazobactam IVPB. 3.375 Gram(s) IV Intermittent every 8 hours  saline laxative (FLEET) Rectal Enema 1 Enema Rectal once    MEDICATIONS  (PRN):  acetaminophen   Tablet .. 650 milliGRAM(s) Oral every 6 hours PRN Temp greater or equal to 38C (100.4F), Mild Pain (1 - 3)  ketorolac   Injectable 15 milliGRAM(s) IV Push every 4 hours PRN Moderate Pain (4 - 6)  ondansetron Injectable 4 milliGRAM(s) IV Push every 6 hours PRN Nausea and/or Vomiting      Allergies    No Known Drug Allergies  Seafood (Hives)    Intolerances      __________________________________________________  REVIEW OF SYSTEMS:    CONSTITUTIONAL: No fever,   EYES: no acute visual disturbances  NECK: No pain or stiffness  RESPIRATORY: No cough; No shortness of breath  CARDIOVASCULAR: No chest pain, no palpitations  GASTROINTESTINAL: No pain. No nausea or vomiting; No diarrhea   NEUROLOGICAL: No headache or numbness, no tremors  HEME/LYMPH: No  bleeding gums    Vital Signs Last 24 Hrs  T(C): 36.9 (20 Mar 2019 04:43), Max: 37.7 (19 Mar 2019 17:43)  T(F): 98.4 (20 Mar 2019 04:43), Max: 99.9 (19 Mar 2019 17:43)  HR: 67 (20 Mar 2019 04:43) (59 - 80)  BP: 127/58 (20 Mar 2019 04:43) (127/58 - 157/69)  BP(mean): 92 (19 Mar 2019 16:05) (88 - 94)  RR: 16 (20 Mar 2019 04:43) (12 - 20)  SpO2: 96% (20 Mar 2019 04:43) (95% - 100%)    ________________________________________________  PHYSICAL EXAM:  GENERAL: NAD,   HEAD:  , Normocephalic  EYES:  conjunctiva and sclera clear  NECK: Supple, No JVD    NERVOUS SYSTEM:  Alert & Oriented X3,   CHEST/LUNG: Clear to auscuitation bilaterally;   HEART: S1 S2+;   ABDOMEN: Soft, Nontender, Nondistended; Bowel sounds present  EXTREMITIES: no cyanosis; no edema; no calf tenderness    _________________________________________________  LABS:                        11.3   3.91  )-----------( 142      ( 19 Mar 2019 08:59 )             35.3     03-19    137  |  106  |  12  ----------------------------<  90  3.7   |  26  |  0.82    Ca    8.4      19 Mar 2019 08:59  Phos  2.5     03-19  Mg     2.2     03-19    TPro  6.5  /  Alb  2.8<L>  /  TBili  0.8  /  DBili  x   /  AST  44<H>  /  ALT  84<H>  /  AlkPhos  127<H>  03-19    PT/INR - ( 19 Mar 2019 08:59 )   PT: 13.6 sec;   INR: 1.22 ratio             CAPILLARY BLOOD GLUCOSE      POCT Blood Glucose.: 117 mg/dL (20 Mar 2019 06:15)  POCT Blood Glucose.: 136 mg/dL (20 Mar 2019 00:02)  POCT Blood Glucose.: 141 mg/dL (19 Mar 2019 18:16)  POCT Blood Glucose.: 103 mg/dL (19 Mar 2019 11:25)

## 2019-03-20 NOTE — PROGRESS NOTE ADULT - SUBJECTIVE AND OBJECTIVE BOX
Patient is a 83y old  Female who presents with a chief complaint of nausea and vomiting (20 Mar 2019 09:27)    pt seen in icu [  ], reg med floor [ x  ], bed [  ], chair at bedside [ x  ], a+o x3 [x  ], lethargic [  ],  nad [x  ]      Allergies    No Known Drug Allergies  Seafood (Hives)        Vitals    T(F): 98.4 (03-20-19 @ 04:43), Max: 99.9 (03-19-19 @ 17:43)  HR: 67 (03-20-19 @ 04:43) (59 - 80)  BP: 127/58 (03-20-19 @ 04:43) (127/58 - 157/69)  RR: 16 (03-20-19 @ 04:43) (12 - 20)  SpO2: 96% (03-20-19 @ 04:43) (95% - 100%)  Wt(kg): --  CAPILLARY BLOOD GLUCOSE      POCT Blood Glucose.: 109 mg/dL (20 Mar 2019 11:26)      Labs                          10.4   3.79  )-----------( 135      ( 20 Mar 2019 07:35 )             31.3       03-20    140  |  108  |  10  ----------------------------<  112<H>  3.5   |  27  |  0.71    Ca    8.0<L>      20 Mar 2019 07:35  Phos  2.8     03-20  Mg     2.1     03-20    TPro  5.7<L>  /  Alb  2.4<L>  /  TBili  0.6  /  DBili  x   /  AST  29  /  ALT  59  /  AlkPhos  109  03-20            .Blood  03-17 @ 22:36   No growth to date.  --  --      .Urine  03-17 @ 13:00   <10,000 CFU/mL Normal Urogenital Liberty  --  --          Radiology Results      Meds    MEDICATIONS  (STANDING):  dextrose 5% + sodium chloride 0.9%. 1000 milliLiter(s) (60 mL/Hr) IV Continuous <Continuous>  enoxaparin Injectable 40 milliGRAM(s) SubCutaneous daily  insulin lispro (HumaLOG) corrective regimen sliding scale   SubCutaneous every 6 hours  pantoprazole  Injectable 40 milliGRAM(s) IV Push daily  piperacillin/tazobactam IVPB. 3.375 Gram(s) IV Intermittent every 8 hours      MEDICATIONS  (PRN):  acetaminophen   Tablet .. 650 milliGRAM(s) Oral every 6 hours PRN Temp greater or equal to 38C (100.4F), Mild Pain (1 - 3)  ketorolac   Injectable 15 milliGRAM(s) IV Push every 4 hours PRN Moderate Pain (4 - 6)  ondansetron Injectable 4 milliGRAM(s) IV Push every 6 hours PRN Nausea and/or Vomiting      Physical Exam        Neuro :  no focal deficits  Respiratory: CTA B/L  CV: RRR, S1S2, no murmurs,   Abdominal: Soft, NT, ND +BS,  Extremities: No edema, + peripheral pulses    ASSESSMENT    Abdominal pain 2nd tp acute pancreatitis  choledocholithiasis   r/o cholangitis  gerd  duodenitis,  fecal impaction  h/o hearing impaired,   Pulmonary emboli  GERD (gastroesophageal reflux disease)  HTN (hypertension)  S/P appendectomy  No significant past surgical history      PLAN      gi f/u  s/p Fleet enema x 2 half hour apart  s/p Citrate magnesium on bottle (8Oz) po half hour after second enema  Lipase wnl noted   LFT's improving  mrcp with distal cbd stone noted   s/p ercp with stone extraction  cont protonix  resume diet  id f/u noted  change to po Cipro and flagyl upon discharge to finish total 10 days  blood cx neg noted above  pulm f/u  cont current meds  d/c plan if pt tolerates diet

## 2019-03-20 NOTE — DISCHARGE NOTE NURSING/CASE MANAGEMENT/SOCIAL WORK - NSDCDPATPORTLINK_GEN_ALL_CORE
You can access the [facility] Patient Portal, offered by [facility], by registering with the following website: [email or web address]

## 2019-03-20 NOTE — PROGRESS NOTE ADULT - SUBJECTIVE AND OBJECTIVE BOX
Meds:  piperacillin/tazobactam IVPB. 3.375 Gram(s) IV Intermittent every 8 hours    Allergies:  Allergies    No Known Drug Allergies  Seafood (Hives)    Intolerances        ROS  [  ] UNABLE TO ELICIT    General:  [  ] None  [  ] Fever  [ x ] Chills  [ x ] Malaise    Skin:  [ x ] None [  ] Rash  [  ] Wound  [  ] Ulcer    HEENT:  [ x ] None  [  ] Sore Throat  [  ] Nasal congestion/ runny nose  [  ] Photophobia [  ] Neck pain      Chest:  [ x ] None   [  ] SOB  [  ] Cough  [  ] None    Cardiovascular:   [ x ] None  [  ] CP  [  ] Palpitation    Gastrointestinal:  [  ] None  [ x ] Abd pain   [ x ] Nausea    [ x ] Vomiting   [  ] Diarrhea	     Genitourinary:  [ x ] None [  ] Polyuria   [  ] Urgency  [  ] Frequency  [  ] Dysuria    [  ]  Hematuria       Musculoskeletal:  [  ] None [  ] Back Pain	[  ] Body aches  [  ] Joint pain  [ x ] Weakness    Neurological: [  ] None [  ]Dizziness  [  ]Visual Disturbance  [  ]Headaches   [ x ] Weakness        PHYSICAL EXAM:  Vital Signs Last 24 Hrs  T(C): 36.9 (20 Mar 2019 04:43), Max: 37.7 (19 Mar 2019 17:43)  T(F): 98.4 (20 Mar 2019 04:43), Max: 99.9 (19 Mar 2019 17:43)  HR: 67 (20 Mar 2019 04:43) (59 - 80)  BP: 127/58 (20 Mar 2019 04:43) (127/58 - 157/69)  BP(mean): 92 (19 Mar 2019 16:05) (88 - 94)  RR: 16 (20 Mar 2019 04:43) (12 - 20)  SpO2: 96% (20 Mar 2019 04:43) (95% - 100%)    Constitutional:    HEENT: [ x ] Wnl  [  ] Pharyngeal congestion    Neck:  [ x ] Supple  [  ]Lymphadenopathy  [ x ] No JVD   [  ] JVD  [  ] Masses   [  ] WNL    CHEST/Respiratory:  [ x ]Clear to auscultation  [  ] Rales   [  ] Rhonchi   [  ] Wheezing     [  ] Chest Tenderness      Cardiovascular:  [ x ] Reg S1 S2   [  ] Irreg S1 S2   [ x ]No Murmur  [  ] +ve Murmurs  [  ]Systolic [  ]Diastolic      Abdomen:  [ x ] Soft  [ x ] No tendrerness  [  ] Tenderness  [  ] Organomegaly  [  ] ABD Distention  [  ] Rigidity                       [ x ] No Regidity                       [ x ] No Rebound Tenderness  [ x ] No Guarding Rigidity  [  ] Rebound Tenderness[  ] Guarding Rigidity                          [ x ]  +ve Bowel Sounds  [  ] Decreased Bowel Sounds    [  ] Absent Bowel Sounds                            Extremities: [ x ] No edema [  ] Edema  [  ] Clubbing   [  ] Cyanosis                         [ x ] No Tender Calf muscles  [  ] Tender Calf muscles                        [ x ] Palpable peripheral pulses    Neurological: [ x ] Awake  [ x ] Alert  [ x ] Oriented  x  3                           [  ] Confused  [  ] Drowsy  [  ] respond to painful stimuli  [  ] Unresponsive    Skin:  [ x ] Intact [  ] Redness [  ] Thrombophlebitis  [  ] Rashes  [  ] Dry  [  ] Ulcers    Ortho:  [  ] Joint Swelling  [  ] Joint erythema [  ] Joint tenderness                [  ] Increased temp. to touch  [  ] DJD [ x ] WNL            LABS/DIAGNOSTIC TESTS                                   10.4   3.79  )-----------( 135      ( 20 Mar 2019 07:35 )             31.3   03-20    140  |  108  |  10  ----------------------------<  112<H>  3.5   |  27  |  0.71    Ca    8.0<L>      20 Mar 2019 07:35  Phos  2.8     03-20  Mg     2.1     03-20    TPro  5.7<L>  /  Alb  2.4<L>  /  TBili  0.6  /  DBili  x   /  AST  29  /  ALT  59  /  AlkPhos  109  03-20          CULTURES:   Culture - Blood (03.17.19 @ 22:36)    Specimen Source: .Blood    Culture Results:   No growth to date.    Culture - Blood (03.17.19 @ 22:36)    Specimen Source: .Blood    Culture Results:   No growth to date.      Culture - Urine (03.17.19 @ 13:00)    Specimen Source: .Urine    Culture Results:   <10,000 CFU/mL Normal Urogenital Liberty        RADIOLOGY    EXAM:  MR MRCP WAW IC                            PROCEDURE DATE:  03/17/2019          INTERPRETATION:  MRCP without contrast    Indication: Right upper quadrant abdominal pain.    Technique: Utilizing a 1.5 Lily high-field magnet, multiplanar   multisequence MR images of the abdomen are acquired without IV contrast,   supplemented by thin and thick slab MRCP images. IV contrast is not   administered due to patient's request to stop the examination.    Comparison: No prior abdominal MR is available for comparison.    Findings: No evidence for a gallstone in the gallbladder. Nonspecific   trace pericholecystic fluid.    There is an apparent 1.2 cm filling defect in the distal common bile duct   (image 80 series 4) with associated upstream common bile duct dilatation   at 1.0 cm in caliber. The filling defect has angular margins. The main   pancreatic duct maintains normal caliber without dilatation.    Multiple tiny brightly T2 hyperintense lesions are seen in the pancreas   measuring upto approximately 3 mm. These may represent nonspecific   cystic neoplasms of the pancreas or IPMN. The pancreas is atrophic.    There is a nonspecific 6 mm brightly T2 hyperintense lesion in the   spleen. This may represent a nonspecific cyst or hemangioma. Further   evaluation is difficult without IV contrast.    Multiple brightly T2 hyperintense lesions are seen in the kidneys   bilaterally measuring up to 3.0 cm on the left, representing probable   cysts. The adrenals appear unremarkable.    No evidence for ascites, or enlarged lymph node.    Impression: No evidence for a gallstone in the gallbladder. Nonspecific   trace pericholecystic fluid.    Apparent 1.2 cm filling defect in the distal common bile duct with   associated upstream common bile duct dilatation at 1.0 cm in caliber. The   filling defect may represent a common bile duct stone or a   mass/cholangiocarcinoma. Further differentiation is difficult without IV   contrast. A common bile duct stone is favored due to its angularmargins.   ERCP is recommended for further evaluation.    Other findings as above.    A preliminary report was provided by [facility].      Assessment and Recommendation:  Patient is a 83 female from home, lives with family, with PMHx of HTN, HLD and PE ( completed 1 year of Eliquis) presented to ED with c/o of nausea and vomiting since 1 day. history obtained from daughter at bed side. Patient complained of nausea and multiple episodes ( 6-7) NBNB vomiting since yesterday. patient also complains of RUQ and epigastric pain, 5/10 intensity, radiating to back along with chills.  Hospital work up suggested Pancreatitis and dilated, and obsctructed CBD.  Patient was admitted and was started on IV Zosyn.  3/18/19 Patient is febrile  and WBC became normal and patient feels much better today, also liver enzymes are improving.  3/19/19 Patient is febrile T. Max 100.6 but deny GI symptoms and WBC stayed normal.  3/20/19 Patient feels better, S/P ERCP and had stone extraction,  and liver enzymes improved.    PROBLEMS AND PLAN:  # ACUTE PANCREATITIS, POSSIBLE CHOLECYSTITIS/ choleangitis.  1- UA & CS suggested contamination.  2- Follow Blood culture to final report are still -VE.  3- GI follow up as needed.  4- Continue IV Zosyn, to change to po Cipro and flagyl upon discharge to finish total 10 days.  5- Fluid and electrolytes management.  6- CBC and CMP follow up.   7- Observe for fever, or leukocytosis.     # HTN.  1- Monitor Blood pressure closely.  2- Blood pressure control.  3- BP. meds as per cardiology and primary care team.       Discussed with medical resident.

## 2019-03-20 NOTE — PROGRESS NOTE ADULT - REASON FOR ADMISSION
nausea and vomiting

## 2019-03-20 NOTE — PROGRESS NOTE ADULT - SUBJECTIVE AND OBJECTIVE BOX
Patient seen and examined.     MEDICATIONS  (STANDING):  dextrose 5% + sodium chloride 0.9%. 1000 milliLiter(s) (60 mL/Hr) IV Continuous <Continuous>  enoxaparin Injectable 40 milliGRAM(s) SubCutaneous daily  insulin lispro (HumaLOG) corrective regimen sliding scale   SubCutaneous every 6 hours  pantoprazole  Injectable 40 milliGRAM(s) IV Push daily  piperacillin/tazobactam IVPB. 3.375 Gram(s) IV Intermittent every 8 hours      MEDICATIONS  (PRN):  acetaminophen   Tablet .. 650 milliGRAM(s) Oral every 6 hours PRN Temp greater or equal to 38C (100.4F), Mild Pain (1 - 3)  ketorolac   Injectable 15 milliGRAM(s) IV Push every 4 hours PRN Moderate Pain (4 - 6)  ondansetron Injectable 4 milliGRAM(s) IV Push every 6 hours PRN Nausea and/or Vomiting     Medications up to date at time of exam.    PHYSICAL EXAMINATION:  Patient has no new complaints.  GENERAL: The patient is a well-developed, well-nourished, in no apparent distress.     Vital Signs Last 24 Hrs  T(C): 36.9 (20 Mar 2019 04:43), Max: 37.7 (19 Mar 2019 17:43)  T(F): 98.4 (20 Mar 2019 04:43), Max: 99.9 (19 Mar 2019 17:43)  HR: 67 (20 Mar 2019 04:43) (59 - 80)  BP: 127/58 (20 Mar 2019 04:43) (127/58 - 157/69)  BP(mean): 92 (19 Mar 2019 16:05) (88 - 94)  RR: 16 (20 Mar 2019 04:43) (12 - 20)  SpO2: 96% (20 Mar 2019 04:43) (95% - 100%)   (if applicable)    Chest Tube (if applicable)    HEENT: Head is normocephalic and atraumatic.     NECK: Supple, no palpable adenopathy.    LUNGS: Clear to auscultation, no wheezing, rales, or rhonchi.    HEART: Regular rate and rhythm without murmur.    ABDOMEN: Soft, nontender, and nondistended.      EXTREMITIES: Without any cyanosis, clubbing, rash, lesions or edema.    NEUROLOGIC: Awake, alert.    SKIN: Warm, dry, good turgor.    LABS:                        10.4   3.79  )-----------( 135      ( 20 Mar 2019 07:35 )             31.3     03-20    140  |  108  |  10  ----------------------------<  112<H>  3.5   |  27  |  0.71    Ca    8.0<L>      20 Mar 2019 07:35  Phos  2.8     03-20  Mg     2.1     03-20    TPro  5.7<L>  /  Alb  2.4<L>  /  TBili  0.6  /  DBili  x   /  AST  29  /  ALT  59  /  AlkPhos  109  03-20    PT/INR - ( 19 Mar 2019 08:59 )   PT: 13.6 sec;   INR: 1.22 ratio           Procalcitonin, Serum: 16.50 ng/mL (03-17-19 @ 21:30)      MICROBIOLOGY: (if applicable)    RADIOLOGY & ADDITIONAL STUDIES:  EKG:   CXR:  ECHO:    IMPRESSION: 83y Female PAST MEDICAL & SURGICAL HISTORY:  Pulmonary emboli  GERD (gastroesophageal reflux disease)  HTN (hypertension)  S/P appendectomy           Patient is a 83 female from home, lives with family, with PMHx of HTN, HLD and PE ( completed 1 year of Eliquis) presented to ED with c/o of nausea and vomiting since 1 day. history obtained from daughter at bed side. Patient complained of nausea and multiple episodes ( 6-7) NBNB vomiting since yesterday. patient also complains of RUQ and epigastric pain, 5/10 intensity, radiating to back along with chills. Patient denies fever, headaches, jaundice, chest pain, SOB, palpitation, diarrhea, constipation, dysuria, skin rashes, muscle or joint pains.     --    81 y/o female with pmhx as above presents with nausea, vomiting. SOB due to mild pulm edema, acute pancreatitis     +leukocytosis  +pulm edema, mild  +procalcitonin 16  biliary obstruction    RECOMMENDATIONS:     - con't with antibiotics as per ID recommendations.    - abd pain improved   - GI follow up   - DVT and GI prophylaxis.

## 2019-03-20 NOTE — PROGRESS NOTE ADULT - ASSESSMENT
83 female from home, lives with family, with PMHx of HTN, HLD and PE ( completed 1 year of Eliquis) presented to ED with c/o of nausea and vomiting since 1 day, admitted for acute pancreatitis, choledocholithiasis, 1.2 cm distal cbd stone, currently pain improved.       Problem: Pancreatitis, acute  -s/p ERCP on 3/19,  stone extraction, sphincterectomy   -pain control   -Blood cx negative, afebrile    - GI consult Dr Garza    Problem: HTN (hypertension)  Assessment and Plan: hold home meds as BP borderline, will restart once BP normalizes     Problem: HLD (hyperlipidemia)  Assessment and Plan:  Lipid panel wnl  old atorvastatin for now given elevated lfts     Problem: Prophylactic measure  Assessment and Plan: lovenox for Dvt prophylaxis.  protonix for GI ppx as has duodenitis     Problem: fecal impaction:  Will give fleet enema, if not improve then need another enema, mg citrate

## 2019-03-22 LAB
CULTURE RESULTS: SIGNIFICANT CHANGE UP
CULTURE RESULTS: SIGNIFICANT CHANGE UP
SPECIMEN SOURCE: SIGNIFICANT CHANGE UP
SPECIMEN SOURCE: SIGNIFICANT CHANGE UP

## 2019-07-15 ENCOUNTER — OUTPATIENT (OUTPATIENT)
Dept: OUTPATIENT SERVICES | Facility: HOSPITAL | Age: 84
LOS: 1 days | Discharge: HOME | End: 2019-07-15

## 2019-07-15 DIAGNOSIS — I26.99 OTHER PULMONARY EMBOLISM WITHOUT ACUTE COR PULMONALE: ICD-10-CM

## 2019-07-15 DIAGNOSIS — Z90.49 ACQUIRED ABSENCE OF OTHER SPECIFIED PARTS OF DIGESTIVE TRACT: Chronic | ICD-10-CM

## 2019-07-16 PROBLEM — I10 HYPERTENSION: Status: ACTIVE | Noted: 2018-05-09

## 2019-07-16 PROBLEM — I26.99 PULMONARY EMBOLISM, BILATERAL: Status: ACTIVE | Noted: 2018-05-09

## 2019-07-17 ENCOUNTER — APPOINTMENT (OUTPATIENT)
Dept: HEMATOLOGY ONCOLOGY | Facility: CLINIC | Age: 84
End: 2019-07-17

## 2019-07-17 DIAGNOSIS — I10 ESSENTIAL (PRIMARY) HYPERTENSION: ICD-10-CM

## 2019-07-17 DIAGNOSIS — I26.99 OTHER PULMONARY EMBOLISM W/OUT ACUTE COR PULMONALE: ICD-10-CM

## 2019-09-27 NOTE — ED PROVIDER NOTE - OBJECTIVE STATEMENT
"Paracentesis  Date/Time: 2019 5:00 PM  Location procedure was performed: [facility] INTERVENTIONAL RADIOLOGY  Performed by: Jossue Villafuerte NP  Authorized by: Jossue Villafuerte NP   Pre-operative diagnosis: ascites  Post-operative diagnosis: ascites  Consent Done: Yes  Consent: Written consent obtained.  Consent given by: patient  Patient understanding: patient states understanding of the procedure being performed  Patient consent: the patient's understanding of the procedure matches consent given  Procedure consent: procedure consent matches procedure scheduled  Relevant documents: relevant documents present and verified  Test results: test results available and properly labeled  Site marked: the operative site was marked  Imaging studies: imaging studies available  Required items: required blood products, implants, devices, and special equipment available  Patient identity confirmed: , MRN, name and verbally with patient  Time out: Immediately prior to procedure a "time out" was called to verify the correct patient, procedure, equipment, support staff and site/side marked as required.  Initial or subsequent exam: initial  Procedure purpose: therapeutic  Indications: abdominal discomfort secondary to ascites  Anesthesia: local infiltration    Anesthesia:  Local Anesthetic: lidocaine 1% without epinephrine  Anesthetic total: 10 mL  Patient sedated: no  Preparation: Patient was prepped and draped in the usual sterile fashion.  Needle gauge: 5 Romanian.  Ultrasound guidance: no  Puncture site: L lower quadrant  Fluid removed: see nurses note for volume removed  Fluid appearance: clear, light yellow  Dressing: 4x4 sterile gauze  Technical procedures used: [place] catheter  Specimens: No  Implants: No  Patient tolerance: Patient tolerated the procedure well with no immediate complications        Jossue Villafuerte NP  Interventional Radiology  [facility]      "
83 y/o Omani-speaking (translation provided by family member) female who presents with a cc of AP that began approx 30 minutes prior to arrival. It was sudden in onset, across her upper abdomen s/ radiation, "punching" in nature, had been more severe but is now mild, without clear relieving or exacerbating factor and associated with nausea and a sense that she was going to vomit. She denies any prior h/o the same.

## 2020-02-07 NOTE — CONSULT NOTE ADULT - PROVIDER SPECIALTY LIST ADULT
Pulmonology [de-identified] : Today I had a lengthy discussion with the patient regarding their right foot pain.I have addressed all the patient's concerns surrounding the pathology of their condition. I recommend that the patient utilize 50,000 units of vitamin D. A prescription was given in the office today. I recommend that the patient utilize NSAIDs PRN. I would like to see the patient back in the office PRN to reassess their condition. The patient understood and verbally agreed to the treatment plan. All of their questions were answered and they were satisfied with the visit. The patient should call the office if they have any questions or experience worsening symptoms.

## 2020-10-28 NOTE — PATIENT PROFILE ADULT - BRADEN SENSORY
Detail Level: Detailed Render Post-Care Instructions In Note?: no Duration Of Freeze Thaw-Cycle (Seconds): 5 Number Of Freeze-Thaw Cycles: 2 freeze-thaw cycles Post-Care Instructions: I reviewed with the patient in detail post-care instructions. Patient is to wear sunprotection, and avoid picking at any of the treated lesions. Pt may apply Vaseline to crusted or scabbing areas. Consent: The patient's consent was obtained including but not limited to risks of crusting, scabbing, blistering, scarring, darker or lighter pigmentary change, recurrence, incomplete removal and infection. (3) slightly limited

## 2022-01-05 NOTE — PROGRESS NOTE ADULT - OPHTHALMOLOGIC
details… Cheiloplasty (Less Than 50%) Text: A decision was made to reconstruct the defect with a  cheiloplasty.  The defect was undermined extensively.  Additional obicularis oris muscle was excised with a 15 blade scalpel.  The defect was converted into a full thickness wedge, of less than 50% of the vertical height of the lip, to facilite a better cosmetic result.  Small vessels were then tied off with 5-0 monocyrl. The obicularis oris, superficial fascia, adipose and dermis were then reapproximated.  After the deeper layers were approximated the epidermis was reapproximated with particular care given to realign the vermillion border.

## 2022-03-11 NOTE — H&P ADULT - MUSCULOSKELETAL
PAST SURGICAL HISTORY:  History of hernia repair 2010    Other postprocedural status S/P appendectomy    Status post total hysterectomy S/P DENNIS-BSO (total abdominal hysterectomy and bilateral salpingo-oophorectomy)    Surgery, elective left shoulder arthroscopy- 2016  BILAT SHOULDER     detailed exam no joint erythema/no joint warmth/ROM intact/normal strength/no joint swelling/no calf tenderness

## 2022-07-23 ENCOUNTER — EMERGENCY (EMERGENCY)
Facility: HOSPITAL | Age: 87
LOS: 1 days | Discharge: HOME | End: 2022-07-23
Attending: EMERGENCY MEDICINE
Payer: COMMERCIAL

## 2022-07-23 VITALS
SYSTOLIC BLOOD PRESSURE: 109 MMHG | WEIGHT: 139.99 LBS | RESPIRATION RATE: 18 BRPM | TEMPERATURE: 98 F | DIASTOLIC BLOOD PRESSURE: 63 MMHG | HEART RATE: 80 BPM | OXYGEN SATURATION: 94 % | HEIGHT: 62 IN

## 2022-07-23 VITALS
SYSTOLIC BLOOD PRESSURE: 125 MMHG | DIASTOLIC BLOOD PRESSURE: 59 MMHG | OXYGEN SATURATION: 97 % | RESPIRATION RATE: 18 BRPM | TEMPERATURE: 98 F | HEART RATE: 65 BPM

## 2022-07-23 DIAGNOSIS — Z90.49 ACQUIRED ABSENCE OF OTHER SPECIFIED PARTS OF DIGESTIVE TRACT: Chronic | ICD-10-CM

## 2022-07-23 LAB
ALBUMIN SERPL ELPH-MCNC: 4.5 G/DL — SIGNIFICANT CHANGE UP (ref 3.3–5)
ALP SERPL-CCNC: 123 U/L — HIGH (ref 40–120)
ALT FLD-CCNC: 22 U/L — SIGNIFICANT CHANGE UP (ref 10–45)
ANION GAP SERPL CALC-SCNC: 11 MMOL/L — SIGNIFICANT CHANGE UP (ref 5–17)
APPEARANCE UR: CLEAR — SIGNIFICANT CHANGE UP
AST SERPL-CCNC: 40 U/L — SIGNIFICANT CHANGE UP (ref 10–40)
BASOPHILS # BLD AUTO: 0.06 K/UL — SIGNIFICANT CHANGE UP (ref 0–0.2)
BASOPHILS NFR BLD AUTO: 1 % — SIGNIFICANT CHANGE UP (ref 0–2)
BILIRUB SERPL-MCNC: 0.6 MG/DL — SIGNIFICANT CHANGE UP (ref 0.2–1.2)
BILIRUB UR-MCNC: NEGATIVE — SIGNIFICANT CHANGE UP
BUN SERPL-MCNC: 17 MG/DL — SIGNIFICANT CHANGE UP (ref 7–23)
CALCIUM SERPL-MCNC: 9.6 MG/DL — SIGNIFICANT CHANGE UP (ref 8.4–10.5)
CHLORIDE SERPL-SCNC: 103 MMOL/L — SIGNIFICANT CHANGE UP (ref 96–108)
CO2 SERPL-SCNC: 29 MMOL/L — SIGNIFICANT CHANGE UP (ref 22–31)
COLOR SPEC: SIGNIFICANT CHANGE UP
CREAT SERPL-MCNC: 0.74 MG/DL — SIGNIFICANT CHANGE UP (ref 0.5–1.3)
DIFF PNL FLD: NEGATIVE — SIGNIFICANT CHANGE UP
EGFR: 79 ML/MIN/1.73M2 — SIGNIFICANT CHANGE UP
EOSINOPHIL # BLD AUTO: 0.13 K/UL — SIGNIFICANT CHANGE UP (ref 0–0.5)
EOSINOPHIL NFR BLD AUTO: 2.2 % — SIGNIFICANT CHANGE UP (ref 0–6)
GLUCOSE SERPL-MCNC: 97 MG/DL — SIGNIFICANT CHANGE UP (ref 70–99)
GLUCOSE UR QL: NEGATIVE — SIGNIFICANT CHANGE UP
HCT VFR BLD CALC: 38.8 % — SIGNIFICANT CHANGE UP (ref 34.5–45)
HGB BLD-MCNC: 12.5 G/DL — SIGNIFICANT CHANGE UP (ref 11.5–15.5)
IMM GRANULOCYTES NFR BLD AUTO: 0.3 % — SIGNIFICANT CHANGE UP (ref 0–1.5)
KETONES UR-MCNC: NEGATIVE — SIGNIFICANT CHANGE UP
LEUKOCYTE ESTERASE UR-ACNC: NEGATIVE — SIGNIFICANT CHANGE UP
LYMPHOCYTES # BLD AUTO: 0.98 K/UL — LOW (ref 1–3.3)
LYMPHOCYTES # BLD AUTO: 17 % — SIGNIFICANT CHANGE UP (ref 13–44)
MCHC RBC-ENTMCNC: 28.5 PG — SIGNIFICANT CHANGE UP (ref 27–34)
MCHC RBC-ENTMCNC: 32.2 GM/DL — SIGNIFICANT CHANGE UP (ref 32–36)
MCV RBC AUTO: 88.4 FL — SIGNIFICANT CHANGE UP (ref 80–100)
MONOCYTES # BLD AUTO: 0.55 K/UL — SIGNIFICANT CHANGE UP (ref 0–0.9)
MONOCYTES NFR BLD AUTO: 9.5 % — SIGNIFICANT CHANGE UP (ref 2–14)
NEUTROPHILS # BLD AUTO: 4.04 K/UL — SIGNIFICANT CHANGE UP (ref 1.8–7.4)
NEUTROPHILS NFR BLD AUTO: 70 % — SIGNIFICANT CHANGE UP (ref 43–77)
NITRITE UR-MCNC: NEGATIVE — SIGNIFICANT CHANGE UP
NRBC # BLD: 0 /100 WBCS — SIGNIFICANT CHANGE UP (ref 0–0)
PH UR: 7 — SIGNIFICANT CHANGE UP (ref 5–8)
PLATELET # BLD AUTO: 214 K/UL — SIGNIFICANT CHANGE UP (ref 150–400)
POTASSIUM SERPL-MCNC: 3.8 MMOL/L — SIGNIFICANT CHANGE UP (ref 3.5–5.3)
POTASSIUM SERPL-SCNC: 3.8 MMOL/L — SIGNIFICANT CHANGE UP (ref 3.5–5.3)
PROT SERPL-MCNC: 7.4 G/DL — SIGNIFICANT CHANGE UP (ref 6–8.3)
PROT UR-MCNC: NEGATIVE — SIGNIFICANT CHANGE UP
RBC # BLD: 4.39 M/UL — SIGNIFICANT CHANGE UP (ref 3.8–5.2)
RBC # FLD: 13.2 % — SIGNIFICANT CHANGE UP (ref 10.3–14.5)
SODIUM SERPL-SCNC: 143 MMOL/L — SIGNIFICANT CHANGE UP (ref 135–145)
SP GR SPEC: 1.02 — SIGNIFICANT CHANGE UP (ref 1.01–1.02)
UROBILINOGEN FLD QL: ABNORMAL
WBC # BLD: 5.78 K/UL — SIGNIFICANT CHANGE UP (ref 3.8–10.5)
WBC # FLD AUTO: 5.78 K/UL — SIGNIFICANT CHANGE UP (ref 3.8–10.5)

## 2022-07-23 PROCEDURE — G1004: CPT

## 2022-07-23 PROCEDURE — 71250 CT THORAX DX C-: CPT | Mod: MF

## 2022-07-23 PROCEDURE — 99284 EMERGENCY DEPT VISIT MOD MDM: CPT | Mod: 25

## 2022-07-23 PROCEDURE — 87086 URINE CULTURE/COLONY COUNT: CPT

## 2022-07-23 PROCEDURE — 80053 COMPREHEN METABOLIC PANEL: CPT

## 2022-07-23 PROCEDURE — 85025 COMPLETE CBC W/AUTO DIFF WBC: CPT

## 2022-07-23 PROCEDURE — 81003 URINALYSIS AUTO W/O SCOPE: CPT

## 2022-07-23 PROCEDURE — 71250 CT THORAX DX C-: CPT | Mod: 26,MF

## 2022-07-23 PROCEDURE — 99284 EMERGENCY DEPT VISIT MOD MDM: CPT

## 2022-07-23 PROCEDURE — 74176 CT ABD & PELVIS W/O CONTRAST: CPT | Mod: 26,ME

## 2022-07-23 PROCEDURE — 74176 CT ABD & PELVIS W/O CONTRAST: CPT | Mod: ME

## 2022-07-23 RX ORDER — LIDOCAINE 4 G/100G
1 CREAM TOPICAL ONCE
Refills: 0 | Status: COMPLETED | OUTPATIENT
Start: 2022-07-23 | End: 2022-07-23

## 2022-07-23 RX ORDER — ACETAMINOPHEN 500 MG
975 TABLET ORAL ONCE
Refills: 0 | Status: COMPLETED | OUTPATIENT
Start: 2022-07-23 | End: 2022-07-23

## 2022-07-23 RX ADMIN — Medication 975 MILLIGRAM(S): at 13:31

## 2022-07-23 RX ADMIN — Medication 975 MILLIGRAM(S): at 15:50

## 2022-07-23 RX ADMIN — LIDOCAINE 1 PATCH: 4 CREAM TOPICAL at 13:33

## 2022-07-23 NOTE — ED ADULT NURSE NOTE - NSIMPLEMENTINTERV_GEN_ALL_ED
Implemented All Fall with Harm Risk Interventions:  [place] to call system. Call bell, personal items and telephone within reach. Instruct patient to call for assistance. Room bathroom lighting operational. Non-slip footwear when patient is off stretcher. Physically safe environment: no spills, clutter or unnecessary equipment. Stretcher in lowest position, wheels locked, appropriate side rails in place. Provide visual cue, wrist band, yellow gown, etc. Monitor gait and stability. Monitor for mental status changes and reorient to person, place, and time. Review medications for side effects contributing to fall risk. Reinforce activity limits and safety measures with patient and family. Provide visual clues: red socks.

## 2022-07-23 NOTE — ED PROVIDER NOTE - NSFOLLOWUPINSTRUCTIONS_ED_ALL_ED_FT
You were seen in the ED for back pain. Imaging was taken to identify possible causes of this back pain, and did not show any fractures, kidney stones, or other causes for back pain. It is likely muscular pain from your prior fall.     Please take Tylenol 975 mg every 6 hours for pain, and over the counter lidocaine patches according to package instructions. Please follow all pharmacy packaging instructions and warnings.    1) Follow up with your doctor within 1 week  2) Return to the ED immediately for new or worsening symptoms, including new worsening pain, fever, bloody urine, difficulty urinating, dizziness, fainting.  3) Please continue to take any home medications as prescribed  4) Your test results from your ED visit were discussed with you prior to discharge  5) You were provided with a copy of your test results        Dolor de espalda jennifer en los adultos    Acute Back Pain, Adult      El dolor de espalda jennifer es repentino y por lo general no dura mucho tiempo. Se debe generalmente a bhargavi lesión de los músculos y tejidos de la espalda. La lesión puede ser el resultado de:  •Estiramiento en exceso o desgarro de un músculo, tendón o ligamento. Los ligamentos son tejidos que conectan los huesos. Levantar algo de forma incorrecta puede producir un esguince de espalda.      •Desgaste (degeneración) de los discos vertebrales. Los discos vertebrales son tejidos circulares que proporcionan amortiguación entre los huesos de la columna vertebral (vértebras).      •Movimientos de giro, paty al practicar deportes o realizar trabajos de jardinería.      •Un golpe en la espalda.      •Artritis.      Es posible que le realicen un examen físico, análisis de laboratorio u otros estudios de diagnóstico por imágenes para encontrar la causa del dolor. El dolor de espalda jennifer generalmente desaparece con reposo y cuidados en la casa.      Siga estas instrucciones en renteria casa:    Control del dolor, la rigidez y la hinchazón     •Use los medicamentos de venta terrance y los recetados solamente paty se lo haya indicado el médico. El tratamiento puede incluir medicamentos para el dolor y la inflamación que se isabelle por la boca o que se aplican sobre la piel, o relajantes musculares.    •El médico puede recomendarle que se aplique hielo deejay las primeras 24 a 48 horas después del comienzo del dolor. Para hacer esto:  •Ponga el hielo en bhargavi bolsa plástica.      •Coloque bhargavi toalla entre la piel y la bolsa.      •Aplique el hielo deejay 20 minutos, 2 o 3 veces por día.      •Retire el hielo si la piel se pone de color call brillante. Roseto es muy importante. Si no puede sentir dolor, calor o frío, tiene un mayor riesgo de que se dañe la pravin.      •Si se lo indican, aplique calor en la pravin afectada con la frecuencia que le haya indicado el médico. Use la gibran de calor que el médico le recomiende, paty bhargavi compresa de calor húmedo o bhargavi almohadilla térmica.  •Coloque bhargavi toalla entre la piel y la gibran de calor.      •Aplique calor deejay 20 a 30 minutos.      •Retire la gibran de calor si la piel se pone de color call brillante. Roseto es especialmente importante si no puede sentir dolor, calor o frío. Corre un mayor riesgo de sufrir quemaduras.          Actividad   Comparisons of good and bad posture while driving, standing, sitting at a desk, and lifting heavy objects.   • No permanezca en la cama. Hacer reposo en la cama por más de 1 a 2 días puede demorar renteria recuperación.    •Mantenga bhargavi buena postura al sentarse y pararse. No se incline hacia adelante al sentarse ni se encorve al pararse.  •Si trabaja en un escritorio, siéntese cerca de anita para no tener que inclinarse. Mantenga el mentón hacia abajo. Mantenga el alexandra hacia atrás y los codos flexionados en un ángulo de 90 grados (ángulo recto).      •Cuando conduzca, siéntese elevado y cerca del volante. Agregue un apoyo para la espalda (lumbar) al asiento del automóvil, si es necesario.        •Realice caminatas cortas en superficies rylee tan pronto paty le sea posible. Trate de caminar un poco más de tiempo cada día.      • No se siente, conduzca o permanezca de pie en un mismo lugar deejay más de 30 minutos seguidos. Pararse o sentarse deejay largos períodos de tiempo puede sobrecargar la espalda.      • No conduzca ni use maquinaria pesada mientras ana cristina analgésicos recetados.    •Use técnicas apropiadas para levantar objetos. Cuando se inclina y levanta un objeto, utilice posiciones que no sobrecarguen tanto la espalda:  •Flexione las rodillas.      •Mantenga la carga cerca del cuerpo.      •No se tuerza.        •Eleazar actividad física habitualmente paty se lo haya indicado el médico. Hacer ejercicios ayuda a que la espalda sane más rápido y ayuda a evitar las lesiones de la espalda al mantener los músculos josh y flexibles.      •Trabaje con un fisioterapeuta para crear un programa de ejercicios seguros, según lo recomiende el médico. Eleazar ejercicios paty se lo haya indicado el fisioterapeuta.      Estilo de margaret     •Mantenga un peso saludable. El sobrepeso sobrecarga la espalda y hace que resulte difícil tener bhargavi buena postura.      •Evite actividades o situaciones que lo annmarie sentirse ansioso o estresado. El estrés y la ansiedad aumentan la tensión muscular y pueden empeorar el dolor de espalda. Aprenda formas de manejar la ansiedad y el estrés, paty a través del ejercicio.      Instrucciones generales     •Duerma sobre un colchón firme en bhargavi posición cómoda. Intente acostarse de costado, con las rodillas ligeramente flexionadas. Si se recuesta sobre la espalda, coloque bhargavi almohada debajo de las rodillas.    •Mantenga la richie y el alexanrda en línea recta con la columna vertebral (posición neutra) cuando use equipos electrónicos paty teléfonos inteligentes o tablets. Para hacer esto:  •Levante el teléfono inteligente o la tablet para mirarlo en lugar de inclinar la richie o el alexandra para mirar hacia abajo.      •Coloque el teléfono inteligente o la tablet al nivel de renteria amanda mientras tigist la pantalla.      •Siga el plan de tratamiento paty se lo haya indicado el médico. Roseto puede incluir:  •Terapia cognitiva o conductual.      •Acupuntura o terapia de masajes.      •Yoga o meditación.          Comuníquese con un médico si:    •Siente un dolor que no se jennifer con reposo o medicamentos.      •Siente mucho dolor que se extiende a las piernas o las nalgas.      •El dolor no mejora luego de 2 semanas.      •Siente dolor por la noche.      •Pierde peso sin proponérselo.      •Tiene fiebre o escalofríos.      •Siente náuseas o vómitos.      •Siente dolor abdominal.        Solicite ayuda de inmediato si:    •Tiene nuevos problemas para controlar la vejiga o los intestinos.      •Siente debilidad o adormecimiento inusuales en los brazos o en las piernas.      •Siente que va a desmayarse.      Estos síntomas pueden representar un problema grave que constituye bhargavi emergencia. No espere a ba si los síntomas desaparecen. Solicite atención médica de inmediato. Comuníquese con el servicio de emergencias de renteria localidad (911 en los [place]). No conduzca por marina propios medios hasta el hospital.       Resumen    •El dolor de espalda jennifer es repentino y por lo general no dura mucho tiempo.      •Use técnicas apropiadas para levantar objetos. Cuando se inclina y levanta un objeto, utilice posiciones que no sobrecarguen tanto la espalda.      •Tok los medicamentos de venta terrance y los recetados solamente paty se lo haya indicado el médico, y aplíquese calor o hielo según las indicaciones.      Esta información no tiene paty fin reemplazar el consejo del médico. Asegúrese de hacerle al médico cualquier pregunta que tenga.

## 2022-07-23 NOTE — ED PROVIDER NOTE - MUSCULOSKELETAL, MLM
Spine nontender in midline. +Tender over posterior L rib 7-8 with no overlying skin changes or crepitus

## 2022-07-23 NOTE — ED PROVIDER NOTE - PATIENT PORTAL LINK FT
You can access the FollowMyHealth Patient Portal offered by [facility] by registering at the following website: [email or web address]. By joining [employer]’s FollowMyHealth portal, you will also be able to view your health information using other applications (apps) compatible with our system.

## 2022-07-23 NOTE — ED PROVIDER NOTE - OBJECTIVE STATEMENT
Patient is an 87 yo F with PMH HTN, HLD, gallstones presenting with L flank pain. Pain has been present since 2 weeks ago following fall on her L side, went to ED but left before being evaluated. Pain has been intermittent, non radiating, not worsened with position or breathing. No associated abdominal pain, changes to urine or stool including bloody urine or dysuria, fevers, chills, SOB, CP.

## 2022-07-23 NOTE — ED PROVIDER NOTE - PROGRESS NOTE DETAILS
Jimy Romero MD PGY1: Patient states pain is significantly improved with tylenol and lidocaine patch. CT C/A/P negative for fracture, stones, other abdominal processes - positive for pneumobilia likely 2/2 prior gallbladder procedure. Pain most likely musculoskeletal in etiology having negative imaging for other likely causes. Discussed discharge home with instructions for tylenol and otc lidocaine patch use, patient understands and is agreeable.

## 2022-07-23 NOTE — ED ADULT NURSE NOTE - OBJECTIVE STATEMENT
87YO female with PMH of HTN and high cholesterol via walk in presenting with complaints of flank pain. Pt states mechanically falling two weeks ago, and hitting her left oblique on sink. Two days after falling she started to fell pain, and has been progressively worse, took a Tylenol at night subsiding pain. Today at 9am pain was sharp and unbearable, with headaches and dizziness. Pt Axox4, hard of hearing- doesn't have b/l aids in due to headache, pt [place] speaking, assessment performed in [place], respirations even, & non-labored. Radial pulses strong and equal bilaterally. Abdomen soft,  non-distended, tender LUQ. Pain upon palpation of left flank. Skin warm, dry, and intact. Pt denies chest pain, palpitations, numbness/tingling, fever, chills, diaphoresis, nausea, vomiting, constipation, diarrhea, or urinary symptoms. Pt placed in position of comfort. Daughter at bedside. Pt educated on call bell system and provided call bell. Bed in lowest position, wheels locked, appropriate side rails raised. Pt denies needs at this time.

## 2022-07-23 NOTE — ED PROVIDER NOTE - NSICDXPASTMEDICALHX_GEN_ALL_CORE_FT
PAST MEDICAL HISTORY:  Gallstones     GERD (gastroesophageal reflux disease)     HTN (hypertension)     Pulmonary emboli

## 2022-07-23 NOTE — ED PROVIDER NOTE - ATTENDING CONTRIBUTION TO CARE
pt is a 85 y/o female with flank pain l sided after a fall 2 weeks ago now with intermittent l flank pain with radiation to abd, abd soft, nt, no gi or gu sts, worse with movement but poor historian here with her daughter, vss, l 7-8 rib pain, ct chest/a/p check labs/ua r/o renal colic vs radicular back pain, nvi to le, no midline spine tend.

## 2022-07-23 NOTE — ED PROVIDER NOTE - CLINICAL SUMMARY MEDICAL DECISION MAKING FREE TEXT BOX
See Attending Note See Attending Note    Patient is an 87 yo F with PMH HTN, HLD, gallstones presenting with L flank pain following fall 2 weeks ago. CT C/A/P negative for rib fracture, urolithiasis, acute kidney pathology. Pain likely musculoskeletal in nature considering reproducibility, now controlled well with tylenol and lidocaine patch. Discharge home with pain control.

## 2022-07-24 LAB
CULTURE RESULTS: SIGNIFICANT CHANGE UP
SPECIMEN SOURCE: SIGNIFICANT CHANGE UP

## 2022-08-04 ENCOUNTER — INPATIENT (INPATIENT)
Facility: HOSPITAL | Age: 87
LOS: 6 days | Discharge: HOME | DRG: 854 | End: 2022-08-11
Attending: INTERNAL MEDICINE | Admitting: INTERNAL MEDICINE
Payer: MEDICARE

## 2022-08-04 VITALS
SYSTOLIC BLOOD PRESSURE: 124 MMHG | RESPIRATION RATE: 20 BRPM | TEMPERATURE: 98 F | HEIGHT: 62 IN | DIASTOLIC BLOOD PRESSURE: 63 MMHG | HEART RATE: 70 BPM | OXYGEN SATURATION: 93 %

## 2022-08-04 DIAGNOSIS — Z90.49 ACQUIRED ABSENCE OF OTHER SPECIFIED PARTS OF DIGESTIVE TRACT: Chronic | ICD-10-CM

## 2022-08-04 LAB
ALBUMIN SERPL ELPH-MCNC: 3.2 G/DL — LOW (ref 3.5–5)
ALP SERPL-CCNC: 213 U/L — HIGH (ref 40–120)
ALT FLD-CCNC: 99 U/L DA — HIGH (ref 10–60)
ANION GAP SERPL CALC-SCNC: 10 MMOL/L — SIGNIFICANT CHANGE UP (ref 5–17)
AST SERPL-CCNC: 231 U/L — HIGH (ref 10–40)
BASOPHILS # BLD AUTO: 0.05 K/UL — SIGNIFICANT CHANGE UP (ref 0–0.2)
BASOPHILS NFR BLD AUTO: 0.6 % — SIGNIFICANT CHANGE UP (ref 0–2)
BILIRUB SERPL-MCNC: 1.1 MG/DL — SIGNIFICANT CHANGE UP (ref 0.2–1.2)
BUN SERPL-MCNC: 18 MG/DL — SIGNIFICANT CHANGE UP (ref 7–18)
CALCIUM SERPL-MCNC: 9 MG/DL — SIGNIFICANT CHANGE UP (ref 8.4–10.5)
CHLORIDE SERPL-SCNC: 107 MMOL/L — SIGNIFICANT CHANGE UP (ref 96–108)
CO2 SERPL-SCNC: 26 MMOL/L — SIGNIFICANT CHANGE UP (ref 22–31)
CREAT SERPL-MCNC: 0.93 MG/DL — SIGNIFICANT CHANGE UP (ref 0.5–1.3)
EGFR: 60 ML/MIN/1.73M2 — SIGNIFICANT CHANGE UP
EOSINOPHIL # BLD AUTO: 0.13 K/UL — SIGNIFICANT CHANGE UP (ref 0–0.5)
EOSINOPHIL NFR BLD AUTO: 1.4 % — SIGNIFICANT CHANGE UP (ref 0–6)
GLUCOSE SERPL-MCNC: 121 MG/DL — HIGH (ref 70–99)
HCT VFR BLD CALC: 37.7 % — SIGNIFICANT CHANGE UP (ref 34.5–45)
HGB BLD-MCNC: 12.1 G/DL — SIGNIFICANT CHANGE UP (ref 11.5–15.5)
IMM GRANULOCYTES NFR BLD AUTO: 0.7 % — SIGNIFICANT CHANGE UP (ref 0–1.5)
LIDOCAIN IGE QN: 208 U/L — SIGNIFICANT CHANGE UP (ref 73–393)
LYMPHOCYTES # BLD AUTO: 0.72 K/UL — LOW (ref 1–3.3)
LYMPHOCYTES # BLD AUTO: 8 % — LOW (ref 13–44)
MCHC RBC-ENTMCNC: 28.5 PG — SIGNIFICANT CHANGE UP (ref 27–34)
MCHC RBC-ENTMCNC: 32.1 GM/DL — SIGNIFICANT CHANGE UP (ref 32–36)
MCV RBC AUTO: 88.9 FL — SIGNIFICANT CHANGE UP (ref 80–100)
MONOCYTES # BLD AUTO: 0.72 K/UL — SIGNIFICANT CHANGE UP (ref 0–0.9)
MONOCYTES NFR BLD AUTO: 8 % — SIGNIFICANT CHANGE UP (ref 2–14)
NEUTROPHILS # BLD AUTO: 7.3 K/UL — SIGNIFICANT CHANGE UP (ref 1.8–7.4)
NEUTROPHILS NFR BLD AUTO: 81.3 % — HIGH (ref 43–77)
NRBC # BLD: 0 /100 WBCS — SIGNIFICANT CHANGE UP (ref 0–0)
NT-PROBNP SERPL-SCNC: 188 PG/ML — SIGNIFICANT CHANGE UP (ref 0–450)
PLATELET # BLD AUTO: 166 K/UL — SIGNIFICANT CHANGE UP (ref 150–400)
POTASSIUM SERPL-MCNC: 3.4 MMOL/L — LOW (ref 3.5–5.3)
POTASSIUM SERPL-SCNC: 3.4 MMOL/L — LOW (ref 3.5–5.3)
PROT SERPL-MCNC: 7 G/DL — SIGNIFICANT CHANGE UP (ref 6–8.3)
RBC # BLD: 4.24 M/UL — SIGNIFICANT CHANGE UP (ref 3.8–5.2)
RBC # FLD: 13.2 % — SIGNIFICANT CHANGE UP (ref 10.3–14.5)
SODIUM SERPL-SCNC: 143 MMOL/L — SIGNIFICANT CHANGE UP (ref 135–145)
TROPONIN I, HIGH SENSITIVITY RESULT: 7.2 NG/L — SIGNIFICANT CHANGE UP
WBC # BLD: 8.98 K/UL — SIGNIFICANT CHANGE UP (ref 3.8–10.5)
WBC # FLD AUTO: 8.98 K/UL — SIGNIFICANT CHANGE UP (ref 3.8–10.5)

## 2022-08-04 PROCEDURE — 71045 X-RAY EXAM CHEST 1 VIEW: CPT | Mod: 26

## 2022-08-04 PROCEDURE — 99285 EMERGENCY DEPT VISIT HI MDM: CPT

## 2022-08-04 RX ORDER — MORPHINE SULFATE 50 MG/1
4 CAPSULE, EXTENDED RELEASE ORAL ONCE
Refills: 0 | Status: DISCONTINUED | OUTPATIENT
Start: 2022-08-04 | End: 2022-08-04

## 2022-08-04 RX ORDER — FAMOTIDINE 10 MG/ML
20 INJECTION INTRAVENOUS ONCE
Refills: 0 | Status: COMPLETED | OUTPATIENT
Start: 2022-08-04 | End: 2022-08-04

## 2022-08-04 RX ORDER — SODIUM CHLORIDE 9 MG/ML
1000 INJECTION INTRAMUSCULAR; INTRAVENOUS; SUBCUTANEOUS
Refills: 0 | Status: DISCONTINUED | OUTPATIENT
Start: 2022-08-04 | End: 2022-08-05

## 2022-08-04 RX ORDER — ONDANSETRON 8 MG/1
4 TABLET, FILM COATED ORAL ONCE
Refills: 0 | Status: COMPLETED | OUTPATIENT
Start: 2022-08-04 | End: 2022-08-04

## 2022-08-04 RX ADMIN — SODIUM CHLORIDE 125 MILLILITER(S): 9 INJECTION INTRAMUSCULAR; INTRAVENOUS; SUBCUTANEOUS at 20:33

## 2022-08-04 RX ADMIN — MORPHINE SULFATE 4 MILLIGRAM(S): 50 CAPSULE, EXTENDED RELEASE ORAL at 22:43

## 2022-08-04 RX ADMIN — MORPHINE SULFATE 4 MILLIGRAM(S): 50 CAPSULE, EXTENDED RELEASE ORAL at 22:13

## 2022-08-04 RX ADMIN — FAMOTIDINE 20 MILLIGRAM(S): 10 INJECTION INTRAVENOUS at 20:33

## 2022-08-04 RX ADMIN — ONDANSETRON 4 MILLIGRAM(S): 8 TABLET, FILM COATED ORAL at 22:13

## 2022-08-04 NOTE — ED PROVIDER NOTE - ENMT, MLM
GYN OB OB OB OB OB OB OB Airway patent, Nasal mucosa clear. Mouth with normal mucosa. Throat has no vesicles, no oropharyngeal exudates and uvula is midline. Airway patent, Nasal mucosa clear. Mouth with dry mucosa. Throat has no vesicles, no oropharyngeal exudates and uvula is midline.

## 2022-08-04 NOTE — ED PROVIDER NOTE - NSICDXPASTMEDICALHX_GEN_ALL_CORE_FT
PAST MEDICAL HISTORY:  Gallstones     GERD (gastroesophageal reflux disease)     HTN (hypertension)     Pulmonary emboli      Home

## 2022-08-04 NOTE — ED ADULT NURSE NOTE - OBJECTIVE STATEMENT
87 yo female lying on bed c/o epigastric pain associated with nausea that started 1 hour prior to arrival to the ED. Patient denies vomiting, diarrhea, and constipation at this time.

## 2022-08-04 NOTE — ED PROVIDER NOTE - OBJECTIVE STATEMENT
86 year old female with PMHx of hypertension, GERD, gallstones, and a PE (not currently on blood thinners) and PSHx of appendectomy presents to the ED with complaints of acute onset of nausea and upper abdominal pain today. Patient states that two weeks ago she developed similar upper abdominal pain, at which time she visited the emergency room at [facility] to seek evaluation. Patient reports that at the time she underwent a CAT scan which was unremarkable. Patient endorses that the symptoms then initially resolved, however then recurred today. Patient describes the abdominal pain as a cramping sensation which radiates to her right upper back, and is associated with nausea without any episodes of vomiting. Patient notes that her last bowel movement was this morning, and was normal without any blood. Patient denies any fevers, dysuria, and all other acute complaints. Patient states that she has been passing gas and has been burping without relief of symptoms. Patient reportedly had Rotisserie chicken earlier today, and currently states that she is not hungry. NKDA.

## 2022-08-04 NOTE — ED PROVIDER NOTE - PROGRESS NOTE DETAILS
pt's pain improves, vomited x1 after taking Ktab.  Pt with cholangitis, will admit pt.  Case d/w Dr. Mccabe

## 2022-08-05 DIAGNOSIS — K83.09 OTHER CHOLANGITIS: ICD-10-CM

## 2022-08-05 DIAGNOSIS — K80.20 CALCULUS OF GALLBLADDER WITHOUT CHOLECYSTITIS WITHOUT OBSTRUCTION: ICD-10-CM

## 2022-08-05 DIAGNOSIS — Z29.9 ENCOUNTER FOR PROPHYLACTIC MEASURES, UNSPECIFIED: ICD-10-CM

## 2022-08-05 DIAGNOSIS — I26.99 OTHER PULMONARY EMBOLISM WITHOUT ACUTE COR PULMONALE: ICD-10-CM

## 2022-08-05 DIAGNOSIS — K21.9 GASTRO-ESOPHAGEAL REFLUX DISEASE WITHOUT ESOPHAGITIS: ICD-10-CM

## 2022-08-05 DIAGNOSIS — Z98.890 OTHER SPECIFIED POSTPROCEDURAL STATES: Chronic | ICD-10-CM

## 2022-08-05 DIAGNOSIS — J98.11 ATELECTASIS: ICD-10-CM

## 2022-08-05 DIAGNOSIS — Z87.19 PERSONAL HISTORY OF OTHER DISEASES OF THE DIGESTIVE SYSTEM: ICD-10-CM

## 2022-08-05 DIAGNOSIS — R10.9 UNSPECIFIED ABDOMINAL PAIN: ICD-10-CM

## 2022-08-05 LAB
ALBUMIN SERPL ELPH-MCNC: 2.7 G/DL — LOW (ref 3.5–5)
ALP SERPL-CCNC: 279 U/L — HIGH (ref 40–120)
ALT FLD-CCNC: 670 U/L DA — HIGH (ref 10–60)
ANION GAP SERPL CALC-SCNC: 5 MMOL/L — SIGNIFICANT CHANGE UP (ref 5–17)
APPEARANCE UR: CLEAR — SIGNIFICANT CHANGE UP
AST SERPL-CCNC: 925 U/L — HIGH (ref 10–40)
BACTERIA # UR AUTO: ABNORMAL /HPF
BASOPHILS # BLD AUTO: 0.05 K/UL — SIGNIFICANT CHANGE UP (ref 0–0.2)
BASOPHILS NFR BLD AUTO: 0.3 % — SIGNIFICANT CHANGE UP (ref 0–2)
BILIRUB SERPL-MCNC: 3.1 MG/DL — HIGH (ref 0.2–1.2)
BILIRUB UR-MCNC: NEGATIVE — SIGNIFICANT CHANGE UP
BLD GP AB SCN SERPL QL: SIGNIFICANT CHANGE UP
BUN SERPL-MCNC: 15 MG/DL — SIGNIFICANT CHANGE UP (ref 7–18)
CALCIUM SERPL-MCNC: 8.6 MG/DL — SIGNIFICANT CHANGE UP (ref 8.4–10.5)
CHLORIDE SERPL-SCNC: 111 MMOL/L — HIGH (ref 96–108)
CO2 SERPL-SCNC: 26 MMOL/L — SIGNIFICANT CHANGE UP (ref 22–31)
COLOR SPEC: YELLOW — SIGNIFICANT CHANGE UP
CREAT SERPL-MCNC: 0.88 MG/DL — SIGNIFICANT CHANGE UP (ref 0.5–1.3)
DIFF PNL FLD: ABNORMAL
EGFR: 64 ML/MIN/1.73M2 — SIGNIFICANT CHANGE UP
EOSINOPHIL # BLD AUTO: 0.03 K/UL — SIGNIFICANT CHANGE UP (ref 0–0.5)
EOSINOPHIL NFR BLD AUTO: 0.2 % — SIGNIFICANT CHANGE UP (ref 0–6)
EPI CELLS # UR: SIGNIFICANT CHANGE UP /HPF
GLUCOSE SERPL-MCNC: 126 MG/DL — HIGH (ref 70–99)
GLUCOSE UR QL: NEGATIVE — SIGNIFICANT CHANGE UP
HCT VFR BLD CALC: 37.6 % — SIGNIFICANT CHANGE UP (ref 34.5–45)
HGB BLD-MCNC: 12.2 G/DL — SIGNIFICANT CHANGE UP (ref 11.5–15.5)
IMM GRANULOCYTES NFR BLD AUTO: 0.5 % — SIGNIFICANT CHANGE UP (ref 0–1.5)
KETONES UR-MCNC: NEGATIVE — SIGNIFICANT CHANGE UP
LEUKOCYTE ESTERASE UR-ACNC: NEGATIVE — SIGNIFICANT CHANGE UP
LYMPHOCYTES # BLD AUTO: 0.51 K/UL — LOW (ref 1–3.3)
LYMPHOCYTES # BLD AUTO: 3.2 % — LOW (ref 13–44)
MAGNESIUM SERPL-MCNC: 2.2 MG/DL — SIGNIFICANT CHANGE UP (ref 1.6–2.6)
MCHC RBC-ENTMCNC: 28.8 PG — SIGNIFICANT CHANGE UP (ref 27–34)
MCHC RBC-ENTMCNC: 32.4 GM/DL — SIGNIFICANT CHANGE UP (ref 32–36)
MCV RBC AUTO: 88.7 FL — SIGNIFICANT CHANGE UP (ref 80–100)
MONOCYTES # BLD AUTO: 0.88 K/UL — SIGNIFICANT CHANGE UP (ref 0–0.9)
MONOCYTES NFR BLD AUTO: 5.6 % — SIGNIFICANT CHANGE UP (ref 2–14)
NEUTROPHILS # BLD AUTO: 14.18 K/UL — HIGH (ref 1.8–7.4)
NEUTROPHILS NFR BLD AUTO: 90.2 % — HIGH (ref 43–77)
NITRITE UR-MCNC: NEGATIVE — SIGNIFICANT CHANGE UP
NRBC # BLD: 0 /100 WBCS — SIGNIFICANT CHANGE UP (ref 0–0)
PH UR: 5 — SIGNIFICANT CHANGE UP (ref 5–8)
PHOSPHATE SERPL-MCNC: 2.3 MG/DL — LOW (ref 2.5–4.5)
PLATELET # BLD AUTO: 149 K/UL — LOW (ref 150–400)
POTASSIUM SERPL-MCNC: 3.6 MMOL/L — SIGNIFICANT CHANGE UP (ref 3.5–5.3)
POTASSIUM SERPL-SCNC: 3.6 MMOL/L — SIGNIFICANT CHANGE UP (ref 3.5–5.3)
PROT SERPL-MCNC: 6.3 G/DL — SIGNIFICANT CHANGE UP (ref 6–8.3)
PROT UR-MCNC: 30 MG/DL
RBC # BLD: 4.24 M/UL — SIGNIFICANT CHANGE UP (ref 3.8–5.2)
RBC # FLD: 13.5 % — SIGNIFICANT CHANGE UP (ref 10.3–14.5)
RBC CASTS # UR COMP ASSIST: ABNORMAL /HPF (ref 0–2)
SARS-COV-2 RNA SPEC QL NAA+PROBE: SIGNIFICANT CHANGE UP
SODIUM SERPL-SCNC: 142 MMOL/L — SIGNIFICANT CHANGE UP (ref 135–145)
SP GR SPEC: 1.01 — SIGNIFICANT CHANGE UP (ref 1.01–1.02)
UROBILINOGEN FLD QL: 4
WBC # BLD: 15.73 K/UL — HIGH (ref 3.8–10.5)
WBC # FLD AUTO: 15.73 K/UL — HIGH (ref 3.8–10.5)
WBC UR QL: SIGNIFICANT CHANGE UP /HPF (ref 0–5)

## 2022-08-05 PROCEDURE — 76705 ECHO EXAM OF ABDOMEN: CPT | Mod: 26

## 2022-08-05 PROCEDURE — 74183 MRI ABD W/O CNTR FLWD CNTR: CPT | Mod: 26

## 2022-08-05 PROCEDURE — 74177 CT ABD & PELVIS W/CONTRAST: CPT | Mod: 26,MA

## 2022-08-05 RX ORDER — POTASSIUM CHLORIDE 20 MEQ
40 PACKET (EA) ORAL ONCE
Refills: 0 | Status: COMPLETED | OUTPATIENT
Start: 2022-08-05 | End: 2022-08-05

## 2022-08-05 RX ORDER — ONDANSETRON 8 MG/1
4 TABLET, FILM COATED ORAL ONCE
Refills: 0 | Status: COMPLETED | OUTPATIENT
Start: 2022-08-05 | End: 2022-08-05

## 2022-08-05 RX ORDER — POTASSIUM CHLORIDE 20 MEQ
10 PACKET (EA) ORAL ONCE
Refills: 0 | Status: COMPLETED | OUTPATIENT
Start: 2022-08-05 | End: 2022-08-05

## 2022-08-05 RX ORDER — SODIUM CHLORIDE 9 MG/ML
1000 INJECTION, SOLUTION INTRAVENOUS
Refills: 0 | Status: DISCONTINUED | OUTPATIENT
Start: 2022-08-05 | End: 2022-08-06

## 2022-08-05 RX ORDER — ACETAMINOPHEN 500 MG
650 TABLET ORAL EVERY 6 HOURS
Refills: 0 | Status: DISCONTINUED | OUTPATIENT
Start: 2022-08-05 | End: 2022-08-10

## 2022-08-05 RX ORDER — PIPERACILLIN AND TAZOBACTAM 4; .5 G/20ML; G/20ML
3.38 INJECTION, POWDER, LYOPHILIZED, FOR SOLUTION INTRAVENOUS EVERY 8 HOURS
Refills: 0 | Status: DISCONTINUED | OUTPATIENT
Start: 2022-08-05 | End: 2022-08-10

## 2022-08-05 RX ORDER — ONDANSETRON 8 MG/1
4 TABLET, FILM COATED ORAL EVERY 8 HOURS
Refills: 0 | Status: DISCONTINUED | OUTPATIENT
Start: 2022-08-05 | End: 2022-08-10

## 2022-08-05 RX ORDER — PIPERACILLIN AND TAZOBACTAM 4; .5 G/20ML; G/20ML
3.38 INJECTION, POWDER, LYOPHILIZED, FOR SOLUTION INTRAVENOUS ONCE
Refills: 0 | Status: COMPLETED | OUTPATIENT
Start: 2022-08-05 | End: 2022-08-05

## 2022-08-05 RX ORDER — MONTELUKAST 4 MG/1
10 TABLET, CHEWABLE ORAL DAILY
Refills: 0 | Status: DISCONTINUED | OUTPATIENT
Start: 2022-08-05 | End: 2022-08-10

## 2022-08-05 RX ORDER — SODIUM,POTASSIUM PHOSPHATES 278-250MG
1 POWDER IN PACKET (EA) ORAL
Refills: 0 | Status: COMPLETED | OUTPATIENT
Start: 2022-08-05 | End: 2022-08-06

## 2022-08-05 RX ORDER — ATORVASTATIN CALCIUM 80 MG/1
20 TABLET, FILM COATED ORAL AT BEDTIME
Refills: 0 | Status: DISCONTINUED | OUTPATIENT
Start: 2022-08-05 | End: 2022-08-07

## 2022-08-05 RX ADMIN — PIPERACILLIN AND TAZOBACTAM 25 GRAM(S): 4; .5 INJECTION, POWDER, LYOPHILIZED, FOR SOLUTION INTRAVENOUS at 21:31

## 2022-08-05 RX ADMIN — MONTELUKAST 10 MILLIGRAM(S): 4 TABLET, CHEWABLE ORAL at 12:02

## 2022-08-05 RX ADMIN — Medication 650 MILLIGRAM(S): at 13:15

## 2022-08-05 RX ADMIN — ONDANSETRON 4 MILLIGRAM(S): 8 TABLET, FILM COATED ORAL at 02:30

## 2022-08-05 RX ADMIN — PIPERACILLIN AND TAZOBACTAM 200 GRAM(S): 4; .5 INJECTION, POWDER, LYOPHILIZED, FOR SOLUTION INTRAVENOUS at 03:35

## 2022-08-05 RX ADMIN — Medication 100 MILLIEQUIVALENT(S): at 02:32

## 2022-08-05 RX ADMIN — PIPERACILLIN AND TAZOBACTAM 200 GRAM(S): 4; .5 INJECTION, POWDER, LYOPHILIZED, FOR SOLUTION INTRAVENOUS at 12:02

## 2022-08-05 RX ADMIN — SODIUM CHLORIDE 75 MILLILITER(S): 9 INJECTION, SOLUTION INTRAVENOUS at 06:27

## 2022-08-05 RX ADMIN — Medication 650 MILLIGRAM(S): at 23:56

## 2022-08-05 RX ADMIN — Medication 40 MILLIEQUIVALENT(S): at 01:33

## 2022-08-05 RX ADMIN — SODIUM CHLORIDE 125 MILLILITER(S): 9 INJECTION INTRAMUSCULAR; INTRAVENOUS; SUBCUTANEOUS at 12:02

## 2022-08-05 RX ADMIN — Medication 650 MILLIGRAM(S): at 12:01

## 2022-08-05 RX ADMIN — Medication 1 TABLET(S): at 17:54

## 2022-08-05 NOTE — CHART NOTE - NSCHARTNOTEFT_GEN_A_CORE
EVENT:    SUBJECTIVE:    OBJECTIVE  REVIEW OF SYSTEMS:    CONSTITUTIONAL: No fever  EYES: No acute visual disturbances  NECK: No pain or stiffness  RESPIRATORY: No cough; No shortness of breath  CARDIOVASCULAR: No chest pain, no palpitations  GASTROINTESTINAL: No pain. No nausea or vomiting.  No diarrhea   NEUROLOGICAL: No headache or numbness, no tremors  MUSCULOSKELETAL: No joint pain, no muscle pain  GENITOURINARY: No dysuria, no frequency, no hesitancy  PSYCHIATRY: No depression , no anxiety  ALL OTHER  ROS negative      PHYSICAL EXAM:  GENERAL: NAD  HEENT: Normocephalic;  conjunctivae and sclerae clear; moist mucous membranes;   NECK : supple  CHEST/LUNG: Clear to auscultation bilaterally with good air entry   HEART: S1 S2  regular; no murmurs, gallops or rubs  ABDOMEN: Soft, Nontender, Nondistended; Bowel sounds present x 4 quad  EXTREMITIES: No cyanosis; no edema; no calf tenderness  SKIN: Warm and dry; no rash  NERVOUS SYSTEM:  Awake and alert; Oriented  to place, person and time ; no new deficits      Vital Signs Last 24 Hrs  T(C): 37.1 (05 Aug 2022 18:32), Max: 37.8 (05 Aug 2022 04:15)  T(F): 98.8 (05 Aug 2022 18:32), Max: 100 (05 Aug 2022 04:15)  HR: 76 (05 Aug 2022 18:32) (67 - 93)  BP: 125/56 (05 Aug 2022 18:32) (98/51 - 127/66)  BP(mean): 73 (05 Aug 2022 18:32) (73 - 73)  RR: 17 (05 Aug 2022 18:32) (17 - 20)  SpO2: 96% (05 Aug 2022 18:32) (93% - 99%)    Parameters below as of 05 Aug 2022 18:32  Patient On (Oxygen Delivery Method): nasal cannula  O2 Flow (L/min): 2      LABS:                        12.2   15.73 )-----------( 149      ( 05 Aug 2022 10:13 )             37.6     08-05    142  |  111<H>  |  15  ----------------------------<  126<H>  3.6   |  26  |  0.88    Ca    8.6      05 Aug 2022 10:13  Phos  2.3     08-05  Mg     2.2     08-05    TPro  6.3  /  Alb  2.7<L>  /  TBili  3.1<H>  /  DBili  x   /  AST  925<H>  /  ALT  670<H>  /  AlkPhos  279<H>  08-05      EKG:   IMAGING:    ASSESSMENT:  HPI:  Hx obtained by daughter at bedside  Patient is a 86 y o female with  Hx of GERD, HTN, Chronic back pain, found to be bulging discs on MRI, incidental finding of unprovoked PE on the MRI, s/p several months of eliquis now DC'd, gal bladder calcification found on that CT angio, declined surgery 2018, was for OP f/u, later found with gall stone pancreatitits, with CBD stone in 2019, s/p ERCP and removal of 1.2cm stone March 2019 complicated by pneumobilia, recent ED visit last month for abdominal pain, dc home due to no change in pneumobilia, now comes in due to 2 week hx of persistent,  constant, 10/10, cramping mid epigastric pain that does not radiate, no alleviating factors, but worse with food, a/w nausea, and NBNB emesis x4 in the ED. No chest pain, no shortness of breath, no palpitations, no changes in bowel or bladder function.    In the ED: HD stable, sats fluctuate between mid 90s-high 80s, Afebrile, HR stable  Exam: tenderness in the epigastric region  labs: k of 3.4, transaminitis slightly worsened from prior ED visit, hematuria (microscopic)  CT abdomen and pelvis shows cholangitis with resolution of pneumobilia but dilatation of the cbd to 1cm (05 Aug 2022 05:37)      PLAN: EVENT:  Admitted for cholangitis      SUBJECTIVE:  Pt w/o hearing aid unable to hear.  Dtr-Greta visited and informed NP pt cannot hear w/o hearing aids and her sister took them home.  NP verbalized understanding.  Dtr informed of pending MR. BAUTISTA safety form completed w/ dtr.      OBJECTIVE  REVIEW OF SYSTEMS:    CONSTITUTIONAL: No fever  EYES: No acute visual disturbances  NECK: No pain or stiffness  RESPIRATORY: No cough; No shortness of breath  CARDIOVASCULAR: No chest pain, no palpitations  GASTROINTESTINAL: No pain. No nausea or vomiting.  No diarrhea   NEUROLOGICAL: No headache or numbness, no tremors  MUSCULOSKELETAL: No joint pain, no muscle pain  GENITOURINARY: No dysuria, no frequency, no hesitancy  PSYCHIATRY: No depression , no anxiety  ALL OTHER  ROS negative      PHYSICAL EXAM:  GENERAL: NAD  HEENT: Normocephalic;  conjunctivae and sclerae clear; moist mucous membranes;   NECK : Supple  CHEST/LUNG: Clear to auscultation bilaterally with good air entry   HEART: S1 S2  regular; no murmurs, gallops or rubs  ABDOMEN: Soft, Nontender, Nondistended; Bowel sounds present x 4 quad  EXTREMITIES: No cyanosis; no edema; no calf tenderness  SKIN: Warm and dry; no rash  NERVOUS SYSTEM:  Awake and alert; Oriented  to place, person and time ; no new deficits      Vital Signs Last 24 Hrs  T(C): 37.1 (05 Aug 2022 18:32), Max: 37.8 (05 Aug 2022 04:15)  T(F): 98.8 (05 Aug 2022 18:32), Max: 100 (05 Aug 2022 04:15)  HR: 76 (05 Aug 2022 18:32) (67 - 93)  BP: 125/56 (05 Aug 2022 18:32) (98/51 - 127/66)  BP(mean): 73 (05 Aug 2022 18:32) (73 - 73)  RR: 17 (05 Aug 2022 18:32) (17 - 20)  SpO2: 96% (05 Aug 2022 18:32) (93% - 99%)    Parameters below as of 05 Aug 2022 18:32  Patient On (Oxygen Delivery Method): nasal cannula  O2 Flow (L/min): 2      LABS:                        12.2   15.73 )-----------( 149      ( 05 Aug 2022 10:13 )             37.6     08-05    142  |  111<H>  |  15  ----------------------------<  126<H>  3.6   |  26  |  0.88    Ca    8.6      05 Aug 2022 10:13  Phos  2.3     08-05  Mg     2.2     08-05    TPro  6.3  /  Alb  2.7<L>  /  TBili  3.1<H>  /  DBili  x   /  AST  925<H>  /  ALT  670<H>  /  AlkPhos  279<H>  08-05      EKG:   IMAGING:    ASSESSMENT:  Patient is a 86 y o female with  Hx of GERD, HTN, Chronic back pain, found to be bulging discs on MRI, incidental finding of unprovoked PE on the MRI, s/p several months of eliquis now DC'd, gal bladder calcification found on that CT angio, declined surgery 2018, was for OP f/u, later found with gall stone pancreatitis, with CBD stone in 2019, s/p ERCP and removal of 1.2cm stone March 2019 complicated by pneumobilia, recent ED visit last month for abdominal pain, dc home due to no change in pneumobilia, now comes in due to 2 week hx of persistent,  constant, abdominal pain, CT abdomen and pelvis shows cholangitis with resolution of pneumobilia but dilatation of the cbd to 1cm: now admitted for cholangitis       Problem/Plan - 1:  ·  Problem: Cholangitis.   ·  Plan: Hx obtained by daughter at bedside  Patient is a 86 y o female  gal bladder calcification found on that CT angio, declined surgery 2018,   was for OP f/u, later found with gall stone pancreatitis, with CBD stone in 2019,  s/p ERCP and removal of 1.2cm stone March 2019 complicated by pneumobilia,   recent ED visit last month for abdominal pain, dc home due to no change in pneumobilia,  now comes in due to 2 week hx of persistent,  mid epigastric pain   NBNB emesis x4 in the ED  no white count or fevers but CT shows:  Mild intrahepatic and extrahepatic biliary ductal dilatation with common   bile duct measuring up to 1 cm up to the level of the ampulla. Mild   enhancement of the walls of the bile ducts which raises concern for   cholangitis. Previously seen pneumobilia is no longer visualized. Subtle   hyperdensity in the mid common bile duct which may represent   sludge/stones.   -Dr. Garza consulted for ERCP, MRCP  ID-Dr. Maradiaga consulted, appreciated     Problem/Plan - 2:  ·  Problem: Abdominal pain.   ·  Plan:  Due to cholangitis  C/w Morphine & Zofran PRN  NPO.  C/w IVF     Problem/Plan - 3:  ·  Problem: Pulmonary emboli.   ·  Plan:   if patient had unprovoked PE and due to Age  she may still need AC  hold for now hemonc f/u as OP.  Pulm-Dr. Townsend consulted, appreciated     Problem/Plan - 4:  ·  Problem: GERD (gastroesophageal reflux disease).   ·  Plan:   C/w Protonix.     Problem/Plan - 5:  ·  Problem: Prophylactic measure.   ·  Plan:  AC was held for ERCP.  C/w SCDs at this time. If no planned ERCP consider chemical ppx  C/w Protonix for GI ppx

## 2022-08-05 NOTE — CONSULT NOTE ADULT - SUBJECTIVE AND OBJECTIVE BOX
Patient is a 86y old  Female who presents with a chief complaint of Abdominal pain (05 Aug 2022 11:43)      INTERVAL HPI/OVERNIGHT EVENTS:        PAST MEDICAL & SURGICAL HISTORY:  HTN (hypertension)      GERD (gastroesophageal reflux disease)      Pulmonary emboli      Gallstones      S/P appendectomy      History of ERCP          REVIEW OF SYSTEMS: Total of twelve systems have been reviewed with patient and found to be negative unless mentioned in HPI      SOCIAL HISTORY  Alcohol: Does not drink  Tobacco: Does not smoke  Illicit substance use: None      FAMILY HISTORY: Non contributory to the present illness        ALLERGIES: No Known Drug Allergies  Seafood (Hives)        Vital Signs Last 24 Hrs  T(C): 37.6 (05 Aug 2022 11:27), Max: 37.8 (05 Aug 2022 04:15)  T(F): 99.7 (05 Aug 2022 11:), Max: 100 (05 Aug 2022 04:15)  HR: 73 (05 Aug 2022 11:27) (70 - 93)  BP: 106/57 (05 Aug 2022 11:) (104/52 - 127/66)  BP(mean): --  RR: 19 (05 Aug 2022 11:27) (17 - 20)  SpO2: 99% (05 Aug 2022 11:) (93% - 99%)    Parameters below as of 05 Aug 2022 11:27  Patient On (Oxygen Delivery Method): nasal cannula  O2 Flow (L/min): 2        PHYSICAL EXAM:  GENERAL: Not in distress   CHEST/LUNG:  Aire ntry bilaterally  HEART: s1 and s2 present  ABDOMEN:  Nontender and  Nondistended  EXTREMITIES: No pedal  edema  CNS: Awake and Alert      LABS:                        12.2   15.73 )-----------( 149      ( 05 Aug 2022 10:13 )             37.6     08-05    142  |  111<H>  |  15  ----------------------------<  126<H>  3.6   |  26  |  0.88    Ca    8.6      05 Aug 2022 10:13  Phos  2.3     08-05  Mg     2.2     08-05    TPro  6.3  /  Alb  2.7<L>  /  TBili  3.1<H>  /  DBili  x   /  AST  925<H>  /  ALT  670<H>  /  AlkPhos  279<H>  08-05          Urinalysis Basic - ( 05 Aug 2022 01:48 )  Color: Yellow / Appearance: Clear / S.015 / pH: x  Gluc: x / Ketone: Negative  / Bili: Negative / Urobili: 4   Blood: x / Protein: 30 mg/dL / Nitrite: Negative   Leuk Esterase: Negative / RBC: 5-10 /HPF / WBC 0-2 /HPF   Sq Epi: x / Non Sq Epi: Few /HPF / Bacteria: Few /HPF        MEDICATIONS  (STANDING):  atorvastatin 20 milliGRAM(s) Oral at bedtime  dextrose 5% + sodium chloride 0.9%. 1000 milliLiter(s) (75 mL/Hr) IV Continuous <Continuous>  montelukast 10 milliGRAM(s) Oral daily  piperacillin/tazobactam IVPB.. 3.375 Gram(s) IV Intermittent every 8 hours  potassium phosphate / sodium phosphate Tablet (K-PHOS No. 2) 1 Tablet(s) Oral two times a day    MEDICATIONS  (PRN):  acetaminophen     Tablet .. 650 milliGRAM(s) Oral every 6 hours PRN Moderate Pain (4 - 6)  ondansetron Injectable 4 milliGRAM(s) IV Push every 8 hours PRN Nausea and/or Vomiting          RADIOLOGY & ADDITIONAL TESTS:  < from: CT Abdomen and Pelvis w/ IV Cont (22 @ 01:24) >  Mild intrahepatic and extrahepatic biliary ductal dilatation with common   bile duct measuring up to 1 cm up to the level of the ampulla. Mild   enhancement of thewalls of the bile ducts which raises concern for   cholangitis. Previously seen pneumobilia is no longer visualized. Subtle   hyperdensity in the mid common bile duct which may represent   sludge/stones. Correlate with LFTs. MRI/MRCP should be considered.    Under distended gallbladder with air in the cystic duct and gallbladder.   Nonspecific pericholecystic fluid.          < end of copied text >  < from: US Hepatic & Pancreatic (22 @ 10:03) >    The gallbladder is partially contracted.  No evidence of cholelithiasis   or acute cholecystitis.    --- End of Report ---    < end of copied text >  COVID-19 PCR . (22 @ 03:13)   COVID-19 PCR: NotDetec:             Patient is a 86y old  Female with  Hx of GERD, HTN, Chronic back pain, found to be bulging discs on MRI, incidental finding of unprovoked PE on the MRI, s/p several months of eliquis now DC'd, gall bladder calcification found on that CT angio, declined surgery , was for OP f/u, later found with gall stone pancreatitits, with CBD stone in , s/p ERCP and removal of 1.2cm stone 2019 complicated by pneumobilia, recent ED visit last month for abdominal pain, dc home due to no change in pneumobilia, now comes in due to 2 week hx of persistent,  constant, 10/10, cramping mid epigastric pain that does not radiate. On admission, she found to have low grade fever, Leukocytosis and elevated Transaminitis. The Ct abd/pelvis shows Mild intrahepatic and extrahepatic biliary ductal dilatation with common bile duct measuring up to 1 cm up to the level of the ampulla. Mild  enhancement of the walls of the bile ducts which raises concern for  cholangitis. Previously seen pneumobilia is no longer visualized. She has started on Zosyn and the ID consult requested to assist with further evaluation and antibiotic management.       REVIEW OF SYSTEMS: Total of twelve systems have been reviewed and found to be negative unless mentioned in HPI      PAST MEDICAL & SURGICAL HISTORY:  HTN (hypertension)  GERD (gastroesophageal reflux disease)  Pulmonary emboli  Gallstones  S/P appendectomy  History of ERCP      SOCIAL HISTORY  Alcohol: Does not drink  Tobacco: Does not smoke  Illicit substance use: None      FAMILY HISTORY: Non contributory to the present illness      ALLERGIES: No Known Drug Allergies  Seafood (Hives)      Vital Signs Last 24 Hrs  T(C): 37.6 (05 Aug 2022 11:27), Max: 37.8 (05 Aug 2022 04:15)  T(F): 99.7 (05 Aug 2022 11:27), Max: 100 (05 Aug 2022 04:15)  HR: 73 (05 Aug 2022 11:27) (70 - 93)  BP: 106/57 (05 Aug 2022 11:27) (104/52 - 127/66)  BP(mean): --  RR: 19 (05 Aug 2022 11:27) (17 - 20)  SpO2: 99% (05 Aug 2022 11:27) (93% - 99%)    Parameters below as of 05 Aug 2022 11:27  Patient On (Oxygen Delivery Method): nasal cannula  O2 Flow (L/min): 2      PHYSICAL EXAM:  GENERAL: Not in distress   CHEST/LUNG:  Not using accessory muscles   HEART: s1 and s2 present  ABDOMEN:  ? Tenderness resolved   EXTREMITIES: No pedal  edema  CNS: Awake and Alert      LABS:                        12.2   15.73 )-----------( 149      ( 05 Aug 2022 10:13 )             37.6     08-05    142  |  111<H>  |  15  ----------------------------<  126<H>  3.6   |  26  |  0.88    Ca    8.6      05 Aug 2022 10:13  Phos  2.3     08-  Mg     2.2     08-    TPro  6.3  /  Alb  2.7<L>  /  TBili  3.1<H>  /  DBili  x   /  AST  925<H>  /  ALT  670<H>  /  AlkPhos  279<H>  08-      Urinalysis Basic - ( 05 Aug 2022 01:48 )  Color: Yellow / Appearance: Clear / S.015 / pH: x  Gluc: x / Ketone: Negative  / Bili: Negative / Urobili: 4   Blood: x / Protein: 30 mg/dL / Nitrite: Negative   Leuk Esterase: Negative / RBC: 5-10 /HPF / WBC 0-2 /HPF   Sq Epi: x / Non Sq Epi: Few /HPF / Bacteria: Few /HPF        MEDICATIONS  (STANDING):  atorvastatin 20 milliGRAM(s) Oral at bedtime  dextrose 5% + sodium chloride 0.9%. 1000 milliLiter(s) (75 mL/Hr) IV Continuous <Continuous>  montelukast 10 milliGRAM(s) Oral daily  piperacillin/tazobactam IVPB.. 3.375 Gram(s) IV Intermittent every 8 hours  potassium phosphate / sodium phosphate Tablet (K-PHOS No. 2) 1 Tablet(s) Oral two times a day    MEDICATIONS  (PRN):  acetaminophen     Tablet .. 650 milliGRAM(s) Oral every 6 hours PRN Moderate Pain (4 - 6)  ondansetron Injectable 4 milliGRAM(s) IV Push every 8 hours PRN Nausea and/or Vomiting      RADIOLOGY & ADDITIONAL TESTS:    22: CT Abdomen and Pelvis w/ IV Cont (22 @ 01:24) Mild intrahepatic and extrahepatic biliary ductal dilatation with common   bile duct measuring up to 1 cm up to the level of the ampulla. Mild  enhancement of the walls of the bile ducts which raises concern for   cholangitis. Previously seen pneumobilia is no longer visualized. Subtle hyperdensity in the mid common bile duct which may represent   sludge/stones. Correlate with LFTs. MRI/MRCP should be considered.    Under distended gallbladder with air in the cystic duct and gallbladder.  Nonspecific pericholecystic fluid.      22: US Hepatic & Pancreatic (22 @ 10:03) The gallbladder is partially contracted.  No evidence of cholelithiasis or acute cholecystitis.      MICROBIOLOGY DATA:    COVID-19 PCR . (22 @ 03:13)   COVID-19 PCR: NotDetec:

## 2022-08-05 NOTE — H&P ADULT - NSHPPHYSICALEXAM_GEN_ALL_CORE
T(C): 37.3 (08-05-22 @ 08:06), Max: 37.8 (08-05-22 @ 04:15)  T(F): 99.1 (08-05-22 @ 08:06), Max: 100 (08-05-22 @ 04:15)  HR: 74 (08-05-22 @ 08:06) (70 - 93)  BP: 104/52 (08-05-22 @ 08:06) (104/52 - 127/66)  RR: 18 (08-05-22 @ 08:06) (17 - 20)  SpO2: 97% (08-05-22 @ 08:06) (93% - 97%)    GENERAL: NAD, lying in bed comfortably  HEAD:  Atraumatic, Normocephalic  EYES: EOMI, PERRLA, conjunctiva and sclera clear  ENT: Moist mucous membranes  NECK: Supple, No JVD  CHEST/LUNG: Clear to auscultation bilaterally; No rales, rhonchi, wheezing, or rubs. Unlabored respirations  HEART: Regular rate and rhythm; No murmurs, rubs, or gallops  ABDOMEN: Bowel sounds present; Soft, Nontender, Nondistended. No hepatomegally  EXTREMITIES:  2+ Peripheral Pulses, brisk capillary refill. No clubbing, cyanosis, or edema  NERVOUS SYSTEM:  Alert & Oriented X3, speech clear. No deficits   MSK: FROM all 4 extremities, full and equal strength  SKIN: No rashes or lesions

## 2022-08-05 NOTE — CONSULT NOTE ADULT - NEUROLOGICAL
details… cranial nerves II-XII intact/sensation intact/responds to pain/responds to verbal commands/cranial nerves intact

## 2022-08-05 NOTE — CONSULT NOTE ADULT - SUBJECTIVE AND OBJECTIVE BOX
[  ] STAT REQUEST              [ X ] ROUTINE REQUEST    Patient is a 86 year old female with abdominal pain. GI consulted to evaluate.        HPI:   Patient is a 86 y o female with  Past medical history significant for GERD, HTN, Chronic back pain, found to be bulging discs on MRI, incidental finding of unprovoked PE on the MRI, s/p several months of eliquis now DC'd, gal bladder calcification found on that CT angio, declined surgery 2018, was for OP f/u, later found with gall stone pancreatitits, with CBD stone in 2019, s/p ERCP and removal of 1.2cm stone March 2019 complicated by pneumobilia, recent ED visit last month for abdominal pain, dc home due to no change in pneumobilia, now comes in due to 2 week hx of persistent,  constant, 10/10, cramping mid epigastric pain that does not radiate, no alleviating factors, but worse with food, a/w nausea, and NBNB emesis x4 in the ED. No chest pain, no shortness of breath, no palpitations, no changes in bowel or bladder function.         PAIN MANAGEMENT:  Pain Scale:                 /10  Pain Location:      Prior Colonoscopy:  Unknown    PAST MEDICAL HISTORY  HTN (hypertension)  GERD   Pulmonary emboli  Gallstones        PAST SURGICAL HISTORY  Appendectomy         Allergies    No Known Drug Allergies  Seafood (Hives)    Intolerances  None         MEDICATIONS  (STANDING):  atorvastatin 20 milliGRAM(s) Oral at bedtime  dextrose 5% + sodium chloride 0.9%. 1000 milliLiter(s) (75 mL/Hr) IV Continuous <Continuous>  dextrose 5% + sodium chloride 0.9%. 1000 milliLiter(s) (75 mL/Hr) IV Continuous <Continuous>  montelukast 10 milliGRAM(s) Oral daily  piperacillin/tazobactam IVPB.. 3.375 Gram(s) IV Intermittent every 8 hours  potassium phosphate / sodium phosphate Tablet (K-PHOS No. 2) 1 Tablet(s) Oral two times a day    MEDICATIONS  (PRN):  acetaminophen     Tablet .. 650 milliGRAM(s) Oral every 6 hours PRN Moderate Pain (4 - 6)  ondansetron Injectable 4 milliGRAM(s) IV Push every 8 hours PRN Nausea and/or Vomiting      SOCIAL HISTORY  Advanced Directives:       [  ] Full Code       [ X ] DNR  Marital Status:         [  ] M      [ X ] S      [  ] D       [  ] W  Children:       [X  ] Yes      [  ] No  Occupation:        [  ] Employed       [ X ] Unemployed       [  ] Retired  Diet:       [ X ] Regular       [  ] PEG feeding          [  ] NG tube feeding  Drug Use:           [X  ] Patient denied          [  ] Yes  Alcohol:           [ X ] No             [  ] Yes (socially)         [  ] Yes (chronic)  Tobacco:           [  ] Yes           [ X ] No      FAMILY HISTORY  [ X ] Heart Disease            [ X ] Diabetes             [ X ] HTN             [  ] Colon Cancer             [  ] Stomach Cancer              [  ] Pancreatic Cancer      VITAL SIGNS  Vital Signs Last 24 Hrs  T(C): 37.1 (05 Aug 2022 18:32), Max: 37.8 (05 Aug 2022 04:15)  T(F): 98.8 (05 Aug 2022 18:32), Max: 100 (05 Aug 2022 04:15)  HR: 76 (05 Aug 2022 18:32) (67 - 93)  BP: 125/56 (05 Aug 2022 18:32) (98/51 - 127/66)  BP(mean): 73 (05 Aug 2022 18:32) (73 - 73)  RR: 17 (05 Aug 2022 18:32) (17 - 19)  SpO2: 96% (05 Aug 2022 18:32) (93% - 99%)  Parameters below as of 05 Aug 2022 18:32  Patient On (Oxygen Delivery Method): nasal cannula  O2 Flow (L/min): 2          CBC Full  -  ( 05 Aug 2022 10:13 )  WBC Count : 15.73 K/uL  RBC Count : 4.24 M/uL  Hemoglobin : 12.2 g/dL  Hematocrit : 37.6 %  Platelet Count - Automated : 149 K/uL  Mean Cell Volume : 88.7 fl  Mean Cell Hemoglobin : 28.8 pg  Mean Cell Hemoglobin Concentration : 32.4 gm/dL  Auto Neutrophil # : 14.18 K/uL  Auto Lymphocyte # : 0.51 K/uL  Auto Monocyte # : 0.88 K/uL  Auto Eosinophil # : 0.03 K/uL  Auto Basophil # : 0.05 K/uL  Auto Neutrophil % : 90.2 %  Auto Lymphocyte % : 3.2 %  Auto Monocyte % : 5.6 %  Auto Eosinophil % : 0.2 %  Auto Basophil % : 0.3 %      08-05    142  |  111<H>  |  15  ----------------------------<  126<H>  3.6   |  26  |  0.88    Ca    8.6      05 Aug 2022 10:13  Phos  2.3     08-05  Mg     2.2     08-05    TPro  6.3  /  Alb  2.7<L>  /  TBili  3.1<H>  /  DBili  x   /  AST  925<H>  /  ALT  670<H>  /  AlkPhos  279<H>  08-05      Lipase, Serum: 208 U/L (08-04 @ 20:35)    Urinalysis (08.05.22 @ 01:48)   pH Urine: 5.0   Blood, Urine: Moderate   Glucose Qualitative, Urine: Negative   Color: Yellow   Urine Appearance: Clear   Bilirubin: Negative   Ketone - Urine: Negative   Specific Gravity: 1.015   Protein, Urine: 30 mg/dL   Urobilinogen: 4   Nitrite: Negative   Leukocyte Esterase Concentration: Negative       ECG    Ventricular Rate 80 BPM    Atrial Rate 80 BPM    P-R Interval 152 ms    QRS Duration 88 ms    Q-T Interval 374 ms    QTC Calculation(Bezet) 431 ms    P Axis 33 degrees    R Axis 31 degrees    T Axis 30 degrees    Diagnosis Line *** Poor data quality, interpretation may be adversely affected  Normal sinus rhythm  Normal ECG            RADIOLOGY/IMAGING                    ACC: [id number] EXAM:  US LIVER AND PANCREAS                          PROCEDURE DATE:  08/05/2022          INTERPRETATION:  CLINICAL INFORMATION: Upper abdominal pain.    COMPARISON: Abdominal ultrasound from 06/08/2018.    TECHNIQUE: Sonography of the right upper quadrant.    FINDINGS:    Liver: Within normal limits.  Bile ducts: The partially visualized proximal  extra-hepatic duct   measures 7 mm, not significantly changed in size from 6 mm on 06/08/2018.  Gallbladder: Partially contracted.  No evidence of gallstones.  Pancreas: Obscured by bowel gas and not diagnostically assessed.  Right kidney: 9.9 cm.. No hydronephrosis.  Ascites: None.  IVC: Visualized portions are within normal limits.    IMPRESSION:    The gallbladder is partially contracted.  No evidence of cholelithiasis   or acute cholecystitis.        ACC: [id number] EXAM:  CT ABDOMEN AND PELVIS IC                          PROCEDURE DATE:  08/05/2022          INTERPRETATION:  CLINICAL INFORMATION: Upper abdominal pain.    COMPARISON: 7/23/2022.    CONTRAST/COMPLICATIONS:  IV Contrast: Omnipaque 350  90 cc administered   10 cc discarded  Oral Contrast: NONE  Complications: None reported at time of study completion    PROCEDURE:  CT of the Abdomen and Pelvis was performed.  Sagittal and coronal reformats were performed.    FINDINGS:  LOWER CHEST: Bibasilar dependent and platelike atelectasis. Heart is   enlarged.    LIVER: Within normal limits.  BILE DUCTS: Mild intrahepatic and extrahepatic biliary ductal dilatation   with common bile duct measuring up to 1 cm up to the level of the   ampulla. Mild enhancement of the walls of the bile ducts. Previously seen   pneumobilia is no longer visualized. Subtle high density in the mid   common bile duct. Small volume of air in the cystic duct and gallbladder.  GALLBLADDER: Underdistended. Pericholecystic fluid.  SPLEEN: Few tiny subcentimeter low-attenuation lesions that are too small   to characterize.  PANCREAS: Atrophy of the pancreas. No peripancreatic inflammatory changes   or peripancreatic fluid.  ADRENALS: Within normal limits.  KIDNEYS/URETERS: Kidneys enhance symmetrically without hydronephrosis.   Left renal cysts. Few tiny subcentimeter low-attenuation lesions in both   kidneys which are too small to characterize. Nonspecific symmetric   bilateral perinephric stranding.    BLADDER: Within normal limits.  REPRODUCTIVE ORGANS: Uterus and adnexa are unremarkable.    BOWEL: Small hiatus hernia. No bowel obstruction or overt bowel wall   thickening. Appendix is not discretely visualized.  PERITONEUM: No ascites, pneumoperitoneum, or loculated collection. No   mesenteric lymphadenopathy.  VESSELS: Atherosclerotic calcifications of the aortoiliac tree and   abdominal aortic branches. Normal caliber abdominal aorta.  RETROPERITONEUM/LYMPH NODES: No lymphadenopathy.  ABDOMINALWALL: Within normal limits.  BONES: Degenerative changes of the spine.    IMPRESSION:  Mild intrahepatic and extrahepatic biliary ductal dilatation with common   bile duct measuring up to 1 cm up to the level of the ampulla. Mild   enhancement of thewalls of the bile ducts which raises concern for   cholangitis. Previously seen pneumobilia is no longer visualized. Subtle   hyperdensity in the mid common bile duct which may represent   sludge/stones. Correlate with LFTs. MRI/MRCP should be considered.    Under distended gallbladder with air in the cystic duct and gallbladder.   Nonspecific pericholecystic fluid.

## 2022-08-05 NOTE — H&P ADULT - NSHPREVIEWOFSYSTEMS_GEN_ALL_CORE
REVIEW OF SYSTEMS:    CONSTITUTIONAL: No weakness, fevers or chills  EYES/ENT: No visual changes;  No vertigo or throat pain   NECK: No pain or stiffness  RESPIRATORY: No cough, wheezing, hemoptysis; No shortness of breath  CARDIOVASCULAR: No chest pain or palpitations  GASTROINTESTINAL: +epigastric pain. + nausea, vomiting, no hematemesis; No diarrhea or constipation. No melena or hematochezia.  GENITOURINARY: No dysuria, frequency or hematuria  NEUROLOGICAL: No numbness or weakness  SKIN: No itching, rashes home medications include lexapro 20mg daily and klonopin 1mg PRN. However patient endorses that she has not been taking home lexapro for weeks.  - Received 0.5mg oral ativan in ED  - Monitor need for anxiety medications and f/u collateral from psychiatrist  - c/w klonopin 1mg daily PRN T wave inversion noted in EKG.  - f/u repeat EKG AM

## 2022-08-05 NOTE — CONSULT NOTE ADULT - CARDIOVASCULAR
regular rate and rhythm/S1 S2 present/no gallops/no rub/no murmur/no JVD/normal PMI/no pedal edema details…

## 2022-08-05 NOTE — CONSULT NOTE ADULT - NEGATIVE MUSCULOSKELETAL SYMPTOMS
no joint swelling/no muscle cramps/no stiffness/no neck pain/no arm pain L/no arm pain R/no back pain

## 2022-08-05 NOTE — H&P ADULT - ATTENDING COMMENTS
86 year old female with PMHx of hypertension, GERD, gallstones, and a PE (not currently on blood thinners) and PSHx of appendectomy presents to the ED with complaints of acute onset of nausea and upper abdominal pain today. Patient states that two weeks ago she developed similar upper abdominal pain, at which time she visited the emergency room at [facility] to seek evaluation. Patient reports that at the time she underwent a CAT scan which was unremarkable. Patient endorses that the symptoms then initially resolved, however then recurred today. Patient describes the abdominal pain as a cramping sensation which radiates to her right upper back, and is associated with nausea without any episodes of vomiting. Patient notes that her last bowel movement was this morning, and was normal without any blood. Patient denies any fevers, dysuria, and all other acute complaints. Patient states that she has been passing gas and has been burping without relief of symptoms. Patient reportedly had Rotisserie chicken earlier today, and currently states that she is not hungry. NKDA.     assessment  --  cholangitis, choledocholithiasis, h/o hypertension, GERD, gallstones, and a PE (not currently on blood thinners) and PSHx of appendectomy     plan  --  admit to med, zosyn, cont preadmit home meds, gi and dvt prophylaxis  cbc, bmp, mg, phos, lipids, tsh, bld cx, ua, ucx,     mrcp    id cons  gi cons

## 2022-08-05 NOTE — H&P ADULT - ASSESSMENT
Hx obtained by daughter at bedside  Patient is a 86 y o female with  Hx of GERD, HTN, Chronic back pain, found to be bulging discs on MRI, incidental finding of unprovoked PE on the MRI, s/p several months of eliquis now DC'd, gal bladder calcification found on that CT angio, declined surgery 2018, was for OP f/u, later found with gall stone pancreatitits, with CBD stone in 2019, s/p ERCP and removal of 1.2cm stone March 2019 complicated by pneumobilia, recent ED visit last month for abdominal pain, dc home due to no change in pneumobilia, now comes in due to 2 week hx of persistent,  constant, abdominal pain, CT abdomen and pelvis shows cholangitis with resolution of pneumobilia but dilatation of the cbd to 1cm: now admitted for cholangitis

## 2022-08-05 NOTE — CONSULT NOTE ADULT - SUBJECTIVE AND OBJECTIVE BOX
PULMONARY CONSULT NOTE      DENICE MELCHOR  MRN-[id number]    History of Present Illness:  Reason for Admission: Abdominal pain  History of Present Illness:   Hx obtained by daughter at bedside  Patient is a 86 y o female with  Hx of GERD, HTN, Chronic back pain, found to be bulging discs on MRI, incidental finding of unprovoked PE on the MRI, s/p several months of eliquis now DC'd, gal bladder calcification found on that CT angio, declined surgery , was for OP f/u, later found with gall stone pancreatitits, with CBD stone in , s/p ERCP and removal of 1.2cm stone 2019 complicated by pneumobilia, recent ED visit last month for abdominal pain, dc home due to no change in pneumobilia, now comes in due to 2 week hx of persistent,  constant, 10/10, cramping mid epigastric pain that does not radiate, no alleviating factors, but worse with food, a/w nausea, and NBNB emesis x4 in the ED. No chest pain, no shortness of breath, no palpitations, no changes in bowel or bladder function.    In the ED: HD stable, sats fluctuate between mid 90s-high 80s, Afebrile, HR stable  Exam: tenderness in the epigastric region  labs: k of 3.4, transaminitis slightly worsened from prior ED visit, hematuria (microscopic)  CT abdomen and pelvis shows cholangitis with resolution of pneumobilia but dilatation of the cbd to 1cm          HISTORY OF PRESENT ILLNESS: As Above. Awake, alert, comfortable in bed in NAD    MEDICATIONS  (STANDING):  atorvastatin 20 milliGRAM(s) Oral at bedtime  dextrose 5% + sodium chloride 0.9%. 1000 milliLiter(s) (75 mL/Hr) IV Continuous <Continuous>  montelukast 10 milliGRAM(s) Oral daily  sodium chloride 0.9%. 1000 milliLiter(s) (125 mL/Hr) IV Continuous <Continuous>      MEDICATIONS  (PRN):  ondansetron Injectable 4 milliGRAM(s) IV Push every 8 hours PRN Nausea and/or Vomiting      Allergies    No Known Drug Allergies  Seafood (Hives)    Intolerances        PAST MEDICAL & SURGICAL HISTORY:  HTN (hypertension)      GERD (gastroesophageal reflux disease)      Pulmonary emboli      Gallstones      S/P appendectomy      History of ERCP          FAMILY HISTORY:  Family history of diabetes mellitus  Mother         SOCIAL HISTORY  Smoking History:     REVIEW OF SYSTEMS:    CONSTITUTIONAL:  No fevers, chills, sweats    HEENT:  Eyes:  No diplopia or blurred vision. ENT:  No earache, sore throat or runny nose.    CARDIOVASCULAR:  No pressure, squeezing, tightness, or heaviness about the chest; no palpitations.    RESPIRATORY:  Per HPI    GASTROINTESTINAL:  No abdominal pain, nausea, vomiting or diarrhea.    GENITOURINARY:  No dysuria, frequency or urgency.    NEUROLOGIC:  No paresthesias, fasciculations, seizures or weakness.    PSYCHIATRIC:  No disorder of thought or mood.    Vital Signs Last 24 Hrs  T(C): 37.6 (05 Aug 2022 11:27), Max: 37.8 (05 Aug 2022 04:15)  T(F): 99.7 (05 Aug 2022 11:), Max: 100 (05 Aug 2022 04:15)  HR: 73 (05 Aug 2022 11:27) (70 - 93)  BP: 106/57 (05 Aug 2022 11:) (104/52 - 127/66)  BP(mean): --  RR: 19 (05 Aug 2022 11:27) (17 - 20)  SpO2: 99% (05 Aug 2022 11:) (93% - 99%)    Parameters below as of 05 Aug 2022 11:  Patient On (Oxygen Delivery Method): nasal cannula  O2 Flow (L/min): 2    I&O's Detail      PHYSICAL EXAMINATION:    GENERAL: The patient is a well-developed, well-nourished _____in no apparent distress.     HEENT: Head is normocephalic and atraumatic. Extraocular muscles are intact. Mucous membranes are moist.     NECK: Supple.     LUNGS: Clear to auscultation without wheezing, rales, or rhonchi. Respirations unlabored    HEART: Regular rate and rhythm without murmur.    ABDOMEN: Soft, nontender, and nondistended.  No hepatosplenomegaly is noted.    EXTREMITIES: Without any cyanosis, clubbing, rash, lesions or edema.    NEUROLOGIC: Grossly intact.      LABS:                        12.2   15.73 )-----------( 149      ( 05 Aug 2022 10:13 )             37.6     08-05    142  |  111<H>  |  15  ----------------------------<  126<H>  3.6   |  26  |  0.88    Ca    8.6      05 Aug 2022 10:13    TPro  6.3  /  Alb  2.7<L>  /  TBili  3.1<H>  /  DBili  x   /  AST  925<H>  /  ALT  670<H>  /  AlkPhos  279<H>  08-05      Urinalysis Basic - ( 05 Aug 2022 01:48 )    Color: Yellow / Appearance: Clear / S.015 / pH: x  Gluc: x / Ketone: Negative  / Bili: Negative / Urobili: 4   Blood: x / Protein: 30 mg/dL / Nitrite: Negative   Leuk Esterase: Negative / RBC: 5-10 /HPF / WBC 0-2 /HPF   Sq Epi: x / Non Sq Epi: Few /HPF / Bacteria: Few /HPF              Serum Pro-Brain Natriuretic Peptide: 188 pg/mL (22 @ 20:35)          MICROBIOLOGY:    RADIOLOGY & ADDITIONAL STUDIES:    CXR:  < from: CT Abdomen and Pelvis w/ IV Cont (22 @ 01:24) >  FINDINGS:  LOWER CHEST: Bibasilar dependent and platelike atelectasis. Heart is   enlarged.    < end of copied text >  < from: CT Abdomen and Pelvis w/ IV Cont (22 @ 01:24) >  IMPRESSION:  Mild intrahepatic and extrahepatic biliary ductal dilatation with common   bile duct measuring up to 1 cm up to the level of the ampulla. Mild   enhancement of thewalls of the bile ducts which raises concern for   cholangitis. Previously seen pneumobilia is no longer visualized. Subtle   hyperdensity in the mid common bile duct which may represent   sludge/stones. Correlate with LFTs. MRI/MRCP should be considered.    Under distended gallbladder with air in the cystic duct and gallbladder.   Nonspecific pericholecystic fluid.      < end of copied text >    Ct scan chest;    ekg;    echo:

## 2022-08-05 NOTE — CONSULT NOTE ADULT - GASTROINTESTINAL
soft/nontender/nondistended/no guarding/no rigidity/no organomegaly/no palpable loki/no masses palpable details…

## 2022-08-05 NOTE — H&P ADULT - NSHPLABSRESULTS_GEN_ALL_CORE
12.1   8.98  )-----------( 166      ( 04 Aug 2022 20:35 )             37.7         143  |  107  |  18  ----------------------------<  121<H>  3.4<L>   |  26  |  0.93    Ca    9.0      04 Aug 2022 20:35    TPro  7.0  /  Alb  3.2<L>  /  TBili  1.1  /  DBili  x   /  AST  231<H>  /  ALT  99<H>  /  AlkPhos  213<H>          LIVER FUNCTIONS - ( 04 Aug 2022 20:35 )  Alb: 3.2 g/dL / Pro: 7.0 g/dL / ALK PHOS: 213 U/L / ALT: 99 U/L DA / AST: 231 U/L / GGT: x             Urinalysis Basic - ( 05 Aug 2022 01:48 )    Color: Yellow / Appearance: Clear / S.015 / pH: x  Gluc: x / Ketone: Negative  / Bili: Negative / Urobili: 4   Blood: x / Protein: 30 mg/dL / Nitrite: Negative   Leuk Esterase: Negative / RBC: 5-10 /HPF / WBC 0-2 /HPF   Sq Epi: x / Non Sq Epi: Few /HPF / Bacteria: Few /HPF          Blood, Urine: Moderate ( @ 01:48)              RADIOLOGY STUDIES:  CT abdomen and pelv 2022  IMPRESSION:  Mild intrahepatic and extrahepatic biliary ductal dilatation with common   bile duct measuring up to 1 cm up to the level of the ampulla. Mild   enhancement of the walls of the bile ducts which raises concern for   cholangitis. Previously seen pneumobilia is no longer visualized. Subtle   hyperdensity in the mid common bile duct which may represent   sludge/stones. Correlate with LFTs. MRI/MRCP should be considered.    Under distended gallbladder with air in the cystic duct and gallbladder.   Nonspecific pericholecystic fluid.

## 2022-08-05 NOTE — H&P ADULT - HISTORY OF PRESENT ILLNESS
Hx obtained by daughter at bedside  Patient is a 86 y o female with  Hx of GERD, HTN, Chronic back pain, found to be bulging discs on MRI, incidental finding of unprovoked PE on the MRI, s/p several months of eliquis now DC'd, gal bladder calcification found on that CT angio, declined surgery 2018, was for OP f/u, later found with gall stone pancreatitits, with CBD stone in 2019, s/p ERCP and removal of 1.2cm stone March 2019 complicated by pneumobilia, recent ED visit last month for abdominal pain, dc home due to no change in pneumobilia, now comes in due to 2 week hx of persistent,  constant, 10/10, cramping mid epigastric pain that does not radiate, no alleviating factors, but worse with food, a/w nausea, and NBNB emesis x4 in the ED. No chest pain, no shortness of breath, no palpitations, no changes in bowel or bladder function.    In the ED: HD stable, sats fluctuate between mid 90s-high 80s, Afebrile, HR stable  Exam: tenderness in the epigastric region  labs: k of 3.4, transaminitis slightly worsened from prior ED visit, hematuria (microscopic)  CT abdomen and pelvis shows cholangitis with resolution of pneumobilia but dilatation of the cbd to 1cm

## 2022-08-05 NOTE — CONSULT NOTE ADULT - NEGATIVE ENMT SYMPTOMS
no hearing difficulty/no ear pain/no tinnitus/no vertigo/no nasal congestion/no nose bleeds/no gum bleeding/no dry mouth/no throat pain/no dysphagia

## 2022-08-05 NOTE — CONSULT NOTE ADULT - RESPIRATORY
clear to auscultation bilaterally/no wheezes/no rales/no rhonchi/no use of accessory muscles/no subcutaneous emphysema/airway patent/breath sounds equal/good air movement

## 2022-08-05 NOTE — H&P ADULT - PROBLEM SELECTOR PLAN 1
Hx obtained by daughter at bedside  Patient is a 86 y o female  gal bladder calcification found on that CT angio, declined surgery 2018,   was for OP f/u, later found with gall stone pancreatitits, with CBD stone in 2019,  s/p ERCP and removal of 1.2cm stone March 2019 complicated by pneumobilia,   recent ED visit last month for abdominal pain, dc home due to no change in pneumobilia,  now comes in due to 2 week hx of persistent,  mid epigastric pain   NBNB emesis x4 in the ED  no white count or fevers but CT shows:  Mild intrahepatic and extrahepatic biliary ductal dilatation with common   bile duct measuring up to 1 cm up to the level of the ampulla. Mild   enhancement of the walls of the bile ducts which raises concern for   cholangitis. Previously seen pneumobilia is no longer visualized. Subtle   hyperdensity in the mid common bile duct which may represent   sludge/stones.   -Dr. Garza consulted for ERCP, MRCP  -patient now agreable for the cholecystectomy if it will improve outcome, see GOC

## 2022-08-06 LAB
A1C WITH ESTIMATED AVERAGE GLUCOSE RESULT: 6.2 % — HIGH (ref 4–5.6)
ALBUMIN SERPL ELPH-MCNC: 2.6 G/DL — LOW (ref 3.5–5)
ALP SERPL-CCNC: 262 U/L — HIGH (ref 40–120)
ALT FLD-CCNC: 397 U/L DA — HIGH (ref 10–60)
ANION GAP SERPL CALC-SCNC: 7 MMOL/L — SIGNIFICANT CHANGE UP (ref 5–17)
APTT BLD: 27.2 SEC — LOW (ref 27.5–35.5)
AST SERPL-CCNC: 289 U/L — HIGH (ref 10–40)
BASOPHILS # BLD AUTO: 0.04 K/UL — SIGNIFICANT CHANGE UP (ref 0–0.2)
BASOPHILS NFR BLD AUTO: 0.5 % — SIGNIFICANT CHANGE UP (ref 0–2)
BILIRUB SERPL-MCNC: 4.2 MG/DL — HIGH (ref 0.2–1.2)
BUN SERPL-MCNC: 11 MG/DL — SIGNIFICANT CHANGE UP (ref 7–18)
CALCIUM SERPL-MCNC: 8.8 MG/DL — SIGNIFICANT CHANGE UP (ref 8.4–10.5)
CHLORIDE SERPL-SCNC: 110 MMOL/L — HIGH (ref 96–108)
CHOLEST SERPL-MCNC: 99 MG/DL — SIGNIFICANT CHANGE UP
CO2 SERPL-SCNC: 25 MMOL/L — SIGNIFICANT CHANGE UP (ref 22–31)
CREAT SERPL-MCNC: 0.76 MG/DL — SIGNIFICANT CHANGE UP (ref 0.5–1.3)
CULTURE RESULTS: SIGNIFICANT CHANGE UP
EGFR: 77 ML/MIN/1.73M2 — SIGNIFICANT CHANGE UP
EOSINOPHIL # BLD AUTO: 0.45 K/UL — SIGNIFICANT CHANGE UP (ref 0–0.5)
EOSINOPHIL NFR BLD AUTO: 5.8 % — SIGNIFICANT CHANGE UP (ref 0–6)
ESTIMATED AVERAGE GLUCOSE: 131 MG/DL — HIGH (ref 68–114)
GLUCOSE SERPL-MCNC: 91 MG/DL — SIGNIFICANT CHANGE UP (ref 70–99)
HCT VFR BLD CALC: 37.1 % — SIGNIFICANT CHANGE UP (ref 34.5–45)
HDLC SERPL-MCNC: 29 MG/DL — LOW
HGB BLD-MCNC: 11.9 G/DL — SIGNIFICANT CHANGE UP (ref 11.5–15.5)
IMM GRANULOCYTES NFR BLD AUTO: 0.5 % — SIGNIFICANT CHANGE UP (ref 0–1.5)
INR BLD: 1.44 RATIO — HIGH (ref 0.88–1.16)
LACTATE SERPL-SCNC: 1.1 MMOL/L — SIGNIFICANT CHANGE UP (ref 0.7–2)
LIPID PNL WITH DIRECT LDL SERPL: 49 MG/DL — SIGNIFICANT CHANGE UP
LYMPHOCYTES # BLD AUTO: 0.41 K/UL — LOW (ref 1–3.3)
LYMPHOCYTES # BLD AUTO: 5.3 % — LOW (ref 13–44)
MAGNESIUM SERPL-MCNC: 2.1 MG/DL — SIGNIFICANT CHANGE UP (ref 1.6–2.6)
MCHC RBC-ENTMCNC: 29 PG — SIGNIFICANT CHANGE UP (ref 27–34)
MCHC RBC-ENTMCNC: 32.1 GM/DL — SIGNIFICANT CHANGE UP (ref 32–36)
MCV RBC AUTO: 90.5 FL — SIGNIFICANT CHANGE UP (ref 80–100)
MONOCYTES # BLD AUTO: 0.53 K/UL — SIGNIFICANT CHANGE UP (ref 0–0.9)
MONOCYTES NFR BLD AUTO: 6.9 % — SIGNIFICANT CHANGE UP (ref 2–14)
NEUTROPHILS # BLD AUTO: 6.26 K/UL — SIGNIFICANT CHANGE UP (ref 1.8–7.4)
NEUTROPHILS NFR BLD AUTO: 81 % — HIGH (ref 43–77)
NON HDL CHOLESTEROL: 70 MG/DL — SIGNIFICANT CHANGE UP
NRBC # BLD: 0 /100 WBCS — SIGNIFICANT CHANGE UP (ref 0–0)
PHOSPHATE SERPL-MCNC: 2.7 MG/DL — SIGNIFICANT CHANGE UP (ref 2.5–4.5)
PLATELET # BLD AUTO: 130 K/UL — LOW (ref 150–400)
POTASSIUM SERPL-MCNC: 3.6 MMOL/L — SIGNIFICANT CHANGE UP (ref 3.5–5.3)
POTASSIUM SERPL-SCNC: 3.6 MMOL/L — SIGNIFICANT CHANGE UP (ref 3.5–5.3)
PROT SERPL-MCNC: 6.8 G/DL — SIGNIFICANT CHANGE UP (ref 6–8.3)
PROTHROM AB SERPL-ACNC: 17.2 SEC — HIGH (ref 10.5–13.4)
RBC # BLD: 4.1 M/UL — SIGNIFICANT CHANGE UP (ref 3.8–5.2)
RBC # FLD: 13.8 % — SIGNIFICANT CHANGE UP (ref 10.3–14.5)
SODIUM SERPL-SCNC: 142 MMOL/L — SIGNIFICANT CHANGE UP (ref 135–145)
SPECIMEN SOURCE: SIGNIFICANT CHANGE UP
TRIGL SERPL-MCNC: 103 MG/DL — SIGNIFICANT CHANGE UP
WBC # BLD: 7.73 K/UL — SIGNIFICANT CHANGE UP (ref 3.8–10.5)
WBC # FLD AUTO: 7.73 K/UL — SIGNIFICANT CHANGE UP (ref 3.8–10.5)

## 2022-08-06 RX ORDER — LIDOCAINE 4 G/100G
1 CREAM TOPICAL DAILY
Refills: 0 | Status: DISCONTINUED | OUTPATIENT
Start: 2022-08-06 | End: 2022-08-10

## 2022-08-06 RX ADMIN — Medication 650 MILLIGRAM(S): at 12:50

## 2022-08-06 RX ADMIN — Medication 325 MILLIGRAM(S): at 19:13

## 2022-08-06 RX ADMIN — Medication 650 MILLIGRAM(S): at 20:10

## 2022-08-06 RX ADMIN — ATORVASTATIN CALCIUM 20 MILLIGRAM(S): 80 TABLET, FILM COATED ORAL at 22:00

## 2022-08-06 RX ADMIN — Medication 325 MILLIGRAM(S): at 11:45

## 2022-08-06 RX ADMIN — LIDOCAINE 1 PATCH: 4 CREAM TOPICAL at 19:30

## 2022-08-06 RX ADMIN — PIPERACILLIN AND TAZOBACTAM 25 GRAM(S): 4; .5 INJECTION, POWDER, LYOPHILIZED, FOR SOLUTION INTRAVENOUS at 05:11

## 2022-08-06 RX ADMIN — PIPERACILLIN AND TAZOBACTAM 25 GRAM(S): 4; .5 INJECTION, POWDER, LYOPHILIZED, FOR SOLUTION INTRAVENOUS at 22:00

## 2022-08-06 RX ADMIN — LIDOCAINE 1 PATCH: 4 CREAM TOPICAL at 14:13

## 2022-08-06 RX ADMIN — MONTELUKAST 10 MILLIGRAM(S): 4 TABLET, CHEWABLE ORAL at 11:48

## 2022-08-06 RX ADMIN — Medication 1 TABLET(S): at 05:11

## 2022-08-06 RX ADMIN — PIPERACILLIN AND TAZOBACTAM 25 GRAM(S): 4; .5 INJECTION, POWDER, LYOPHILIZED, FOR SOLUTION INTRAVENOUS at 14:13

## 2022-08-06 NOTE — PROGRESS NOTE ADULT - ASSESSMENT
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 On admission, she found to have low grade fever, Leukocytosis and elevated Transaminitis. The Ct abd/pelvis shows Mild intrahepatic and extrahepatic biliary ductal dilatation with common bile duct measuring up to 1 cm up to the level of the ampulla. Mild  enhancement of the walls of the bile ducts which raises concern for  cholangitis. Previously seen pneumobilia is no longer visualized. She has started on Zosyn and the ID consult requested to assist with further evaluation and antibiotic management.     # Sepsis ( Low grade fever + Leukocytosis)  # Cholangitis - on CT abd/pelvis  # Transaminitis- improving, ? transient  obstruction     would recommend:    1. Follow up Blood cultures, are in process   2. Trend LFts , trending down   3. Continue Zosyn until work up is done  4. GI follow up       Attending Attestation:    Spent more than 45 minutes on total encounter, more than 50 % of the visit was spent counseling and/or coordinating care by the Attending physician.  Complexity:  - Sepsis  - Cholangitis  - Gall bladder stone

## 2022-08-06 NOTE — PROGRESS NOTE ADULT - SUBJECTIVE AND OBJECTIVE BOX
Patient is seen and examined at the bed side, is afebrile. The blood cultures are in process. The Leukocytosis trended down to normal. The LFTs are trending down.       REVIEW OF SYSTEMS: All other review systems are negative      ALLERGIES: No Known Drug Allergies  Seafood (Hives)      Vital Signs Last 24 Hrs  T(C): 36.8 (06 Aug 2022 13:10), Max: 38 (05 Aug 2022 20:12)  T(F): 98.2 (06 Aug 2022 13:10), Max: 100.4 (05 Aug 2022 20:12)  HR: 70 (06 Aug 2022 13:10) (63 - 76)  BP: 136/53 (06 Aug 2022 13:10) (102/54 - 136/53)  BP(mean): 75 (06 Aug 2022 13:10) (73 - 75)  RR: 18 (06 Aug 2022 13:10) (17 - 18)  SpO2: 96% (06 Aug 2022 13:10) (90% - 96%)    Parameters below as of 06 Aug 2022 13:10  Patient On (Oxygen Delivery Method): room air        PHYSICAL EXAM:  GENERAL: Not in distress   CHEST/LUNG:  Not using accessory muscles   HEART: s1 and s2 present  ABDOMEN:  ? Tenderness resolved   EXTREMITIES: No pedal  edema  CNS: Awake and Alert      LABS:                        11.9   7.73  )-----------( 130      ( 06 Aug 2022 11:56 )             37.1                           12.2   15.73 )-----------( 149      ( 05 Aug 2022 10:13 )             37.6       08-06    142  |  110<H>  |  11  ----------------------------<  91  3.6   |  25  |  0.76    Ca    8.8      06 Aug 2022 11:56  Phos  2.7     08-06  Mg     2.1     08-06    TPro  6.8  /  Alb  2.6<L>  /  TBili  4.2<H>  /  DBili  x   /  AST  289<H>  /  ALT  397<H>  /  AlkPhos  262<H>  08-    08-05    142  |  111<H>  |  15  ----------------------------<  126<H>  3.6   |  26  |  0.88    Ca    8.6      05 Aug 2022 10:13  Phos  2.3       Mg     2.2         TPro  6.3  /  Alb  2.7<L>  /  TBili  3.1<H>  /  DBili  x   /  AST  925<H>  /  ALT  670<H>  /  AlkPhos  279<H>        Urinalysis Basic - ( 05 Aug 2022 01:48 )  Color: Yellow / Appearance: Clear / S.015 / pH: x  Gluc: x / Ketone: Negative  / Bili: Negative / Urobili: 4   Blood: x / Protein: 30 mg/dL / Nitrite: Negative   Leuk Esterase: Negative / RBC: 5-10 /HPF / WBC 0-2 /HPF   Sq Epi: x / Non Sq Epi: Few /HPF / Bacteria: Few /HPF        MEDICATIONS  (STANDING):    atorvastatin 20 milliGRAM(s) Oral at bedtime  lidocaine   4% Patch 1 Patch Transdermal daily  montelukast 10 milliGRAM(s) Oral daily  piperacillin/tazobactam IVPB.. 3.375 Gram(s) IV Intermittent every 8 hours      RADIOLOGY & ADDITIONAL TESTS:    22: CT Abdomen and Pelvis w/ IV Cont (22 @ 01:24) Mild intrahepatic and extrahepatic biliary ductal dilatation with common   bile duct measuring up to 1 cm up to the level of the ampulla. Mild  enhancement of the walls of the bile ducts which raises concern for   cholangitis. Previously seen pneumobilia is no longer visualized. Subtle hyperdensity in the mid common bile duct which may represent   sludge/stones. Correlate with LFTs. MRI/MRCP should be considered.    Under distended gallbladder with air in the cystic duct and gallbladder.  Nonspecific pericholecystic fluid.      22: US Hepatic & Pancreatic (22 @ 10:03) The gallbladder is partially contracted.  No evidence of cholelithiasis or acute cholecystitis.        MICROBIOLOGY DATA:      COVID-19 PCR . (22 @ 03:13)   COVID-19 PCR: NotDetec:

## 2022-08-06 NOTE — PROGRESS NOTE ADULT - SUBJECTIVE AND OBJECTIVE BOX
Patient is a 86y old  Female who presents with a chief complaint of Abdominal pain (05 Aug 2022 19:48)    pt seen in icu [  ], reg med floor [   ], bed [  ], chair at bedside [   ], a+o x3 [  ], lethargic [  ],  nad [  ]    mcmullen [  ], ngt [  ], peg [  ], et tube [  ], cent line [  ], picc line [  ]        Allergies    No Known Drug Allergies  Seafood (Hives)        Vitals    T(F): 97.9 (08-06-22 @ 05:21), Max: 100.4 (08-05-22 @ 20:12)  HR: 63 (08-06-22 @ 05:21) (63 - 76)  BP: 102/54 (08-06-22 @ 05:21) (98/51 - 125/56)  RR: 18 (08-06-22 @ 05:21) (17 - 19)  SpO2: 96% (08-06-22 @ 05:21) (93% - 99%)  Wt(kg): --  CAPILLARY BLOOD GLUCOSE          Labs                          12.2   15.73 )-----------( 149      ( 05 Aug 2022 10:13 )             37.6       08-05    142  |  111<H>  |  15  ----------------------------<  126<H>  3.6   |  26  |  0.88    Ca    8.6      05 Aug 2022 10:13  Phos  2.3     08-05  Mg     2.2     08-05    TPro  6.3  /  Alb  2.7<L>  /  TBili  3.1<H>  /  DBili  x   /  AST  925<H>  /  ALT  670<H>  /  AlkPhos  279<H>  08-05          Troponin I, High Sensitivity Result: 7.2 ng/L (08-04-22 @ 20:35)    Clean Catch Clean Catch (Midstream)  07-23 @ 14:48   <10,000 CFU/mL Normal Urogenital [place]  --  --          Radiology Results      Meds    MEDICATIONS  (STANDING):  atorvastatin 20 milliGRAM(s) Oral at bedtime  dextrose 5% + sodium chloride 0.9%. 1000 milliLiter(s) (75 mL/Hr) IV Continuous <Continuous>  dextrose 5% + sodium chloride 0.9%. 1000 milliLiter(s) (75 mL/Hr) IV Continuous <Continuous>  montelukast 10 milliGRAM(s) Oral daily  piperacillin/tazobactam IVPB.. 3.375 Gram(s) IV Intermittent every 8 hours      MEDICATIONS  (PRN):  acetaminophen     Tablet .. 650 milliGRAM(s) Oral every 6 hours PRN Moderate Pain (4 - 6)  acetaminophen     Tablet .. 650 milliGRAM(s) Oral every 6 hours PRN Temp greater or equal to 38C (100.4F)  ondansetron Injectable 4 milliGRAM(s) IV Push every 8 hours PRN Nausea and/or Vomiting      Physical Exam    Neuro :  no focal deficits  Respiratory: CTA B/L  CV: RRR, S1S2, no murmurs,   Abdominal: Soft, NT, ND +BS,  Extremities: No edema, + peripheral pulses    ASSESSMENT    cholangitis,   choledocholithiasis,   h/o hypertension,   GERD, gallstones,   PE (not currently on blood thinners)  PSHx of appendectomy       Calculus of gallbladder without cholecystitis without obstruction    Yes    HTN (hypertension)    GERD (gastroesophageal reflux disease)    Pulmonary emboli    Gallstones    No significant past surgical history    S/P appendectomy    History of ERCP        PLAN    admit to med,   zosyn,   cont preadmit home meds,   gi and dvt prophylaxis  cbc,   bmp,   mg,   phos,   lipids,   tsh,   bld cx,   ua,   ucx,     mrcp    id cons  gi cons.       Patient is a 86y old  Female who presents with a chief complaint of Abdominal pain (05 Aug 2022 19:48)    pt seen in icu [  ], reg med floor [ x  ], bed [ x ], chair at bedside [   ], a+o x3 [ x ], lethargic [  ],  nad [ x ]      Allergies    No Known Drug Allergies  Seafood (Hives)        Vitals    T(F): 97.9 (08-06-22 @ 05:21), Max: 100.4 (08-05-22 @ 20:12)  HR: 63 (08-06-22 @ 05:21) (63 - 76)  BP: 102/54 (08-06-22 @ 05:21) (98/51 - 125/56)  RR: 18 (08-06-22 @ 05:21) (17 - 19)  SpO2: 96% (08-06-22 @ 05:21) (93% - 99%)  Wt(kg): --  CAPILLARY BLOOD GLUCOSE          Labs                          12.2   15.73 )-----------( 149      ( 05 Aug 2022 10:13 )             37.6       08-05    142  |  111<H>  |  15  ----------------------------<  126<H>  3.6   |  26  |  0.88    Ca    8.6      05 Aug 2022 10:13  Phos  2.3     08-05  Mg     2.2     08-05    TPro  6.3  /  Alb  2.7<L>  /  TBili  3.1<H>  /  DBili  x   /  AST  925<H>  /  ALT  670<H>  /  AlkPhos  279<H>  08-05          Troponin I, High Sensitivity Result: 7.2 ng/L (08-04-22 @ 20:35)    Clean Catch Clean Catch (Midstream)  07-23 @ 14:48   <10,000 CFU/mL Normal Urogenital [place]  --  --          Radiology Results    < from: MR MRCP w/wo IV Cont (08.05.22 @ 17:26) >  FINDINGS:  LOWER CHEST: Bibasilar subsegmental linear atelectasis. Mild   cardiomegaly. Small hiatal hernia.    LIVER: Within normal limits.  BILE DUCTS: Mild biliary ductal dilatation with CBD measuring up to 11 mm   with multiple stones in mid to distal lumen. Mild enhancement of the wall   of the extrahepatic bile duct.  GALLBLADDER: Contracted and contains a focus of air.  SPLEEN: Scattered cysts.  PANCREAS: Innumerable tiny side branch IPMNs.  ADRENALS: Within normal limits.  KIDNEYS/URETERS: Left renal cyst.    VISUALIZED PORTIONS:  BOWEL: Within normal limits.  PERITONEUM: No ascites.  VESSELS: Separate origin of common hepatic artery from the aorta.  RETROPERITONEUM/LYMPH NODES: No lymphadenopathy.  ABDOMINAL WALL: Within normal limits.  BONES: Within normal limits.    IMPRESSION:  Choledocholithiasis with possible cholangitis.    < end of copied text >        Meds    MEDICATIONS  (STANDING):  atorvastatin 20 milliGRAM(s) Oral at bedtime  dextrose 5% + sodium chloride 0.9%. 1000 milliLiter(s) (75 mL/Hr) IV Continuous <Continuous>  dextrose 5% + sodium chloride 0.9%. 1000 milliLiter(s) (75 mL/Hr) IV Continuous <Continuous>  montelukast 10 milliGRAM(s) Oral daily  piperacillin/tazobactam IVPB.. 3.375 Gram(s) IV Intermittent every 8 hours      MEDICATIONS  (PRN):  acetaminophen     Tablet .. 650 milliGRAM(s) Oral every 6 hours PRN Moderate Pain (4 - 6)  acetaminophen     Tablet .. 650 milliGRAM(s) Oral every 6 hours PRN Temp greater or equal to 38C (100.4F)  ondansetron Injectable 4 milliGRAM(s) IV Push every 8 hours PRN Nausea and/or Vomiting      Physical Exam    Neuro :  no focal deficits  Respiratory: CTA B/L  CV: RRR, S1S2, no murmurs,   Abdominal: Soft, NT, ND +BS,  Extremities: No edema, + peripheral pulses    ASSESSMENT    sepsis   cholangitis,   choledocholithiasis,   atelectasis  h/o hypertension,   GERD,   gallstones,   PE (not currently on blood thinners)  appendectomy   HTN (hypertension)        PLAN    mrcp with Choledocholithiasis with possible cholangitis noted above.  cont zosyn,   id f/u   f/u blood cx  ucx neg noted above  gi f/u   f/u ERCP  Protonix daily   pulm f/u   incentive spirometry  Bronchodilators  chest pt   completed her treatment for PE   cont current meds

## 2022-08-06 NOTE — PROGRESS NOTE ADULT - SUBJECTIVE AND OBJECTIVE BOX
Patient is a 86y old  Female who presents with a chief complaint of Abdominal pain (06 Aug 2022 06:53)  Awake, alert, comfortable in bed in NAD. Currently on oxygen supp via NC    INTERVAL HPI/OVERNIGHT EVENTS:      VITAL SIGNS:  T(F): 97.9 (22 @ 05:21)  HR: 63 (22 @ 05:21)  BP: 102/54 (22 @ 05:21)  RR: 18 (22 @ 05:21)  SpO2: 96% (22 @ 05:21)  Wt(kg): --  I&O's Detail          REVIEW OF SYSTEMS:    CONSTITUTIONAL:  No fevers, chills, sweats    HEENT:  Eyes:  No diplopia or blurred vision. ENT:  No earache, sore throat or runny nose.    CARDIOVASCULAR:  No pressure, squeezing, tightness, or heaviness about the chest; no palpitations.    RESPIRATORY:  Per HPI    GASTROINTESTINAL:  + abdominal pain, no  nausea, vomiting or diarrhea.    GENITOURINARY:  No dysuria, frequency or urgency.    NEUROLOGIC:  No paresthesias, fasciculations, seizures or weakness.    PSYCHIATRIC:  No disorder of thought or mood.      PHYSICAL EXAM:    Constitutional: Well developed and nourished  Eyes:Perrla  ENMT: normal  Neck:supple  Respiratory: good air entry  Cardiovascular: S1 S2 regular  Gastrointestinal: Soft, RUQ tenderness  Extremities: No edema  Vascular:normal  Neurological:Awake, alert,Ox3  Musculoskeletal:Normal      MEDICATIONS  (STANDING):  atorvastatin 20 milliGRAM(s) Oral at bedtime  dextrose 5% + sodium chloride 0.9%. 1000 milliLiter(s) (75 mL/Hr) IV Continuous <Continuous>  dextrose 5% + sodium chloride 0.9%. 1000 milliLiter(s) (75 mL/Hr) IV Continuous <Continuous>  montelukast 10 milliGRAM(s) Oral daily  piperacillin/tazobactam IVPB.. 3.375 Gram(s) IV Intermittent every 8 hours    MEDICATIONS  (PRN):  acetaminophen     Tablet .. 650 milliGRAM(s) Oral every 6 hours PRN Moderate Pain (4 - 6)  acetaminophen     Tablet .. 650 milliGRAM(s) Oral every 6 hours PRN Temp greater or equal to 38C (100.4F)  ondansetron Injectable 4 milliGRAM(s) IV Push every 8 hours PRN Nausea and/or Vomiting      Allergies    No Known Drug Allergies  Seafood (Hives)    Intolerances        LABS:                        12.2   15.73 )-----------( 149      ( 05 Aug 2022 10:13 )             37.6     08-05    142  |  111<H>  |  15  ----------------------------<  126<H>  3.6   |  26  |  0.88    Ca    8.6      05 Aug 2022 10:13  Phos  2.3     -  Mg     2.2         TPro  6.3  /  Alb  2.7<L>  /  TBili  3.1<H>  /  DBili  x   /  AST  925<H>  /  ALT  670<H>  /  AlkPhos  279<H>        Urinalysis Basic - ( 05 Aug 2022 01:48 )    Color: Yellow / Appearance: Clear / S.015 / pH: x  Gluc: x / Ketone: Negative  / Bili: Negative / Urobili: 4   Blood: x / Protein: 30 mg/dL / Nitrite: Negative   Leuk Esterase: Negative / RBC: 5-10 /HPF / WBC 0-2 /HPF   Sq Epi: x / Non Sq Epi: Few /HPF / Bacteria: Few /HPF            CAPILLARY BLOOD GLUCOSE        pro-bnp 188  @ 20:35     d-dimer --   @ 20:35      RADIOLOGY & ADDITIONAL TESTS:  < from: US Hepatic & Pancreatic (22 @ 10:03) >  IMPRESSION:    The gallbladder is partially contracted.  No evidence of cholelithiasis   or acute cholecystitis.      < end of copied text >  < from: MR MRCP w/wo IV Cont (22 @ 17:26) >  IMPRESSION:  Choledocholithiasis with possible cholangitis.      < end of copied text >    CXR:    Ct scan chest:    ekg;    echo:

## 2022-08-07 RX ADMIN — Medication 650 MILLIGRAM(S): at 06:15

## 2022-08-07 RX ADMIN — PIPERACILLIN AND TAZOBACTAM 25 GRAM(S): 4; .5 INJECTION, POWDER, LYOPHILIZED, FOR SOLUTION INTRAVENOUS at 05:19

## 2022-08-07 RX ADMIN — PIPERACILLIN AND TAZOBACTAM 25 GRAM(S): 4; .5 INJECTION, POWDER, LYOPHILIZED, FOR SOLUTION INTRAVENOUS at 13:08

## 2022-08-07 RX ADMIN — PIPERACILLIN AND TAZOBACTAM 25 GRAM(S): 4; .5 INJECTION, POWDER, LYOPHILIZED, FOR SOLUTION INTRAVENOUS at 22:07

## 2022-08-07 RX ADMIN — LIDOCAINE 1 PATCH: 4 CREAM TOPICAL at 13:08

## 2022-08-07 RX ADMIN — MONTELUKAST 10 MILLIGRAM(S): 4 TABLET, CHEWABLE ORAL at 13:08

## 2022-08-07 RX ADMIN — Medication 650 MILLIGRAM(S): at 05:19

## 2022-08-07 RX ADMIN — LIDOCAINE 1 PATCH: 4 CREAM TOPICAL at 19:30

## 2022-08-07 RX ADMIN — LIDOCAINE 1 PATCH: 4 CREAM TOPICAL at 02:23

## 2022-08-07 NOTE — PROGRESS NOTE ADULT - SUBJECTIVE AND OBJECTIVE BOX
Patient is a 86y old  Female who presents with a chief complaint of Abdominal pain (06 Aug 2022 16:32)    pt seen in icu [  ], reg med floor [   ], bed [  ], chair at bedside [   ], a+o x3 [  ], lethargic [  ],  nad [  ]    mcmullen [  ], ngt [  ], peg [  ], et tube [  ], cent line [  ], picc line [  ]      Allergies    No Known Drug Allergies  Seafood (Hives)        Vitals    T(F): 100.7 (08-07-22 @ 05:04), Max: 100.7 (08-07-22 @ 05:04)  HR: 66 (08-07-22 @ 05:04) (66 - 71)  BP: 129/56 (08-07-22 @ 05:04) (120/53 - 136/53)  RR: 18 (08-07-22 @ 05:04) (18 - 18)  SpO2: 93% (08-07-22 @ 05:04) (90% - 96%)  Wt(kg): --  CAPILLARY BLOOD GLUCOSE          Labs                          11.9   7.73  )-----------( 130      ( 06 Aug 2022 11:56 )             37.1       08-06    142  |  110<H>  |  11  ----------------------------<  91  3.6   |  25  |  0.76    Ca    8.8      06 Aug 2022 11:56  Phos  2.7     08-06  Mg     2.1     08-06    TPro  6.8  /  Alb  2.6<L>  /  TBili  4.2<H>  /  DBili  x   /  AST  289<H>  /  ALT  397<H>  /  AlkPhos  262<H>  08-06          Troponin I, High Sensitivity Result: 7.2 ng/L (08-04-22 @ 20:35)    Clean Catch Clean Catch (Midstream)  08-05 @ 01:48   <10,000 CFU/mL Normal Urogenital Liberty  --  --      Clean Catch Clean Catch (Midstream)  07-23 @ 14:48   <10,000 CFU/mL Normal Urogenital Liberty  --  --          Radiology Results      Meds    MEDICATIONS  (STANDING):  atorvastatin 20 milliGRAM(s) Oral at bedtime  lidocaine   4% Patch 1 Patch Transdermal daily  montelukast 10 milliGRAM(s) Oral daily  piperacillin/tazobactam IVPB.. 3.375 Gram(s) IV Intermittent every 8 hours      MEDICATIONS  (PRN):  acetaminophen     Tablet .. 650 milliGRAM(s) Oral every 6 hours PRN Moderate Pain (4 - 6)  acetaminophen     Tablet .. 650 milliGRAM(s) Oral every 6 hours PRN Temp greater or equal to 38C (100.4F)  ondansetron Injectable 4 milliGRAM(s) IV Push every 8 hours PRN Nausea and/or Vomiting      Physical Exam    Neuro :  no focal deficits  Respiratory: CTA B/L  CV: RRR, S1S2, no murmurs,   Abdominal: Soft, NT, ND +BS,  Extremities: No edema, + peripheral pulses    ASSESSMENT    cholangitis,   choledocholithiasis,   h/o hypertension,   GERD, gallstones,   PE (not currently on blood thinners)  PSHx of appendectomy       Calculus of gallbladder without cholecystitis without obstruction    Yes    HTN (hypertension)    GERD (gastroesophageal reflux disease)    Pulmonary emboli    Gallstones    No significant past surgical history    S/P appendectomy    History of ERCP        PLAN    admit to med,   zosyn,   cont preadmit home meds,   gi and dvt prophylaxis  cbc,   bmp,   mg,   phos,   lipids,   tsh,   bld cx,   ua,   ucx,     mrcp    id cons  gi cons.           Patient is a 86y old  Female who presents with a chief complaint of Abdominal pain (06 Aug 2022 16:32)    pt seen in icu [  ], reg med floor [ x  ], bed [ x ], chair at bedside [   ], a+o x3 [ x ], lethargic [  ],  nad [ x ]    Allergies    No Known Drug Allergies  Seafood (Hives)        Vitals    T(F): 100.7 (08-07-22 @ 05:04), Max: 100.7 (08-07-22 @ 05:04)  HR: 66 (08-07-22 @ 05:04) (66 - 71)  BP: 129/56 (08-07-22 @ 05:04) (120/53 - 136/53)  RR: 18 (08-07-22 @ 05:04) (18 - 18)  SpO2: 93% (08-07-22 @ 05:04) (90% - 96%)  Wt(kg): --  CAPILLARY BLOOD GLUCOSE          Labs                          11.9   7.73  )-----------( 130      ( 06 Aug 2022 11:56 )             37.1       08-06    142  |  110<H>  |  11  ----------------------------<  91  3.6   |  25  |  0.76    Ca    8.8      06 Aug 2022 11:56  Phos  2.7     08-06  Mg     2.1     08-06    TPro  6.8  /  Alb  2.6<L>  /  TBili  4.2<H>  /  DBili  x   /  AST  289<H>  /  ALT  397<H>  /  AlkPhos  262<H>  08-06          Troponin I, High Sensitivity Result: 7.2 ng/L (08-04-22 @ 20:35)    Clean Catch Clean Catch (Midstream)  08-05 @ 01:48   <10,000 CFU/mL Normal Urogenital Liberty  --  --      Radiology Results      Meds    MEDICATIONS  (STANDING):  atorvastatin 20 milliGRAM(s) Oral at bedtime  lidocaine   4% Patch 1 Patch Transdermal daily  montelukast 10 milliGRAM(s) Oral daily  piperacillin/tazobactam IVPB.. 3.375 Gram(s) IV Intermittent every 8 hours      MEDICATIONS  (PRN):  acetaminophen     Tablet .. 650 milliGRAM(s) Oral every 6 hours PRN Moderate Pain (4 - 6)  acetaminophen     Tablet .. 650 milliGRAM(s) Oral every 6 hours PRN Temp greater or equal to 38C (100.4F)  ondansetron Injectable 4 milliGRAM(s) IV Push every 8 hours PRN Nausea and/or Vomiting      Physical Exam    Neuro :  no focal deficits  Respiratory: CTA B/L  CV: RRR, S1S2, no murmurs,   Abdominal: Soft, NT, ND +BS,  Extremities: No edema, + peripheral pulses    ASSESSMENT    sepsis   cholangitis,   choledocholithiasis,   atelectasis  h/o hypertension,   GERD,   gallstones,   PE (not currently on blood thinners)  appendectomy   HTN (hypertension)        PLAN    mrcp with Choledocholithiasis with possible cholangitis noted   cont zosyn,   id f/u   f/u blood cx   ucx neg noted above  gi f/u   f/u ERCP  Protonix daily   pulm f/u   incentive spirometry  Bronchodilators  chest pt   completed her treatment for PE   phys tx eval noted and rec phys tx 3-5x/week x 2-3 weeks at home w/ home PT   cont current meds

## 2022-08-07 NOTE — PROGRESS NOTE ADULT - ASSESSMENT
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 On admission, she found to have low grade fever, Leukocytosis and elevated Transaminitis. The Ct abd/pelvis shows Mild intrahepatic and extrahepatic biliary ductal dilatation with common bile duct measuring up to 1 cm up to the level of the ampulla. Mild  enhancement of the walls of the bile ducts which raises concern for  cholangitis. Previously seen pneumobilia is no longer visualized. She has started on Zosyn and the ID consult requested to assist with further evaluation and antibiotic management.     # Sepsis ( Low grade fever + Leukocytosis)  # Cholangitis - on CT abd/pelvis  # Transaminitis- improving, ? transient  obstruction     would recommend:    1. The Blood cultures, are in process   2. Trend LFts , trending down   3. Continue Zosyn until work up is done  4. OOB to chair     Attending Attestation:    Spent more than 35 minutes on total encounter, more than 50 % of the visit was spent counseling and/or coordinating care by the Attending physician.  Complexity:  - Sepsis  - Cholangitis  - Gall bladder stone

## 2022-08-07 NOTE — PROGRESS NOTE ADULT - ASSESSMENT
1. Abdominal pain  2. Gall stone  3. Cholangitis  4. Dilated biliary ducts    Suggestions:    1. Follow up LFT's  2. Antibiotics IV  3. ID follow up  4. ERCP  5. Protonix daily  6. DVT prophylaxis

## 2022-08-07 NOTE — PROGRESS NOTE ADULT - SUBJECTIVE AND OBJECTIVE BOX
Patient is a 86y old  Female who presents with a chief complaint of Abdominal pain (07 Aug 2022 09:44)  Awake, alert, comfortable out of bed to chair in NAD. RUQ pain improved    INTERVAL HPI/OVERNIGHT EVENTS:      VITAL SIGNS:  T(F): 100.7 (08-07-22 @ 05:04)  HR: 66 (08-07-22 @ 05:04)  BP: 129/56 (08-07-22 @ 05:04)  RR: 18 (08-07-22 @ 05:04)  SpO2: 93% (08-07-22 @ 05:04)  Wt(kg): --  I&O's Detail          REVIEW OF SYSTEMS:    CONSTITUTIONAL:  No fevers, chills, sweats    HEENT:  Eyes:  No diplopia or blurred vision. ENT:  No earache, sore throat or runny nose.    CARDIOVASCULAR:  No pressure, squeezing, tightness, or heaviness about the chest; no palpitations.    RESPIRATORY:  Per HPI    GASTROINTESTINAL:  No abdominal pain, nausea, vomiting or diarrhea.    GENITOURINARY:  No dysuria, frequency or urgency.    NEUROLOGIC:  No paresthesias, fasciculations, seizures or weakness.    PSYCHIATRIC:  No disorder of thought or mood.      PHYSICAL EXAM:    Constitutional: Well developed and nourished  Eyes:Perrla  ENMT: normal  Neck:supple  Respiratory: good air entry  Cardiovascular: S1 S2 regular  Gastrointestinal: Soft, Non tender  Extremities: No edema  Vascular:normal  Neurological:Awake, alert,Ox3  Musculoskeletal:Normal      MEDICATIONS  (STANDING):  lidocaine   4% Patch 1 Patch Transdermal daily  montelukast 10 milliGRAM(s) Oral daily  piperacillin/tazobactam IVPB.. 3.375 Gram(s) IV Intermittent every 8 hours    MEDICATIONS  (PRN):  acetaminophen     Tablet .. 650 milliGRAM(s) Oral every 6 hours PRN Moderate Pain (4 - 6)  acetaminophen     Tablet .. 650 milliGRAM(s) Oral every 6 hours PRN Temp greater or equal to 38C (100.4F)  ondansetron Injectable 4 milliGRAM(s) IV Push every 8 hours PRN Nausea and/or Vomiting      Allergies    No Known Drug Allergies  Seafood (Hives)    Intolerances        LABS:                        11.9   7.73  )-----------( 130      ( 06 Aug 2022 11:56 )             37.1     08-06    142  |  110<H>  |  11  ----------------------------<  91  3.6   |  25  |  0.76    Ca    8.8      06 Aug 2022 11:56  Phos  2.7     08-06  Mg     2.1     08-06    TPro  6.8  /  Alb  2.6<L>  /  TBili  4.2<H>  /  DBili  x   /  AST  289<H>  /  ALT  397<H>  /  AlkPhos  262<H>  08-06    PT/INR - ( 06 Aug 2022 11:56 )   PT: 17.2 sec;   INR: 1.44 ratio         PTT - ( 06 Aug 2022 11:56 )  PTT:27.2 sec    Culture - Urine (08.05.22 @ 01:48)   Specimen Source: Clean Catch Clean Catch (Midstream)   Culture Results:   <10,000 CFU/mL Normal Urogenital Liberty           CAPILLARY BLOOD GLUCOSE        pro-bnp 188 08-04 @ 20:35     d-dimer --  08-04 @ 20:35      RADIOLOGY & ADDITIONAL TESTS:    CXR:    Ct scan chest:    ekg;    echo:

## 2022-08-07 NOTE — PROGRESS NOTE ADULT - CARDIOVASCULAR
details… regular rate and rhythm/S1 S2 present/no gallops/no rub/no murmur/no JVD/normal PMI/no pedal edema

## 2022-08-07 NOTE — PROGRESS NOTE ADULT - SUBJECTIVE AND OBJECTIVE BOX
Patient is seen and examined at the bed side, is afebrile. The blood cultures are still in process. The GI follow up recommendation noted.       REVIEW OF SYSTEMS: All other review systems are negative      ALLERGIES: No Known Drug Allergies  Seafood (Hives)      Vital Signs Last 24 Hrs  T(C): 37.3 (07 Aug 2022 13:27), Max: 38.2 (07 Aug 2022 05:04)  T(F): 99.2 (07 Aug 2022 13:27), Max: 100.7 (07 Aug 2022 05:04)  HR: 63 (07 Aug 2022 13:27) (63 - 71)  BP: 130/58 (07 Aug 2022 13:27) (120/53 - 130/58)  BP(mean): --  RR: 18 (07 Aug 2022 13:) (18 - 18)  SpO2: 93% (07 Aug 2022 13:) (92% - 93%)    Parameters below as of 07 Aug 2022 13:27  Patient On (Oxygen Delivery Method): room air        PHYSICAL EXAM:  GENERAL: Not in distress   CHEST/LUNG:  Not using accessory muscles   HEART: s1 and s2 present  ABDOMEN:  ? Tenderness resolved   EXTREMITIES: No pedal  edema  CNS: Awake and Alert      LABS:No new Labs                         11.9   7.73  )-----------( 130      ( 06 Aug 2022 11:56 )             37.1                  12.2   15.73 )-----------( 149      ( 05 Aug 2022 10:13 )             37.6         08-06    142  |  110<H>  |  11  ----------------------------<  91  3.6   |  25  |  0.76    Ca    8.8      06 Aug 2022 11:56  Phos  2.7     08-06  Mg     2.1     08-06    TPro  6.8  /  Alb  2.6<L>  /  TBili  4.2<H>  /  DBili  x   /  AST  289<H>  /  ALT  397<H>  /  AlkPhos  262<H>  08    08-05    142  |  111<H>  |  15  ----------------------------<  126<H>  3.6   |  26  |  0.88    Ca    8.6      05 Aug 2022 10:13  Phos  2.3     08-  Mg     2.2     08-    TPro  6.3  /  Alb  2.7<L>  /  TBili  3.1<H>  /  DBili  x   /  AST  925<H>  /  ALT  670<H>  /  AlkPhos  279<H>  08-      Urinalysis Basic - ( 05 Aug 2022 01:48 )  Color: Yellow / Appearance: Clear / S.015 / pH: x  Gluc: x / Ketone: Negative  / Bili: Negative / Urobili: 4   Blood: x / Protein: 30 mg/dL / Nitrite: Negative   Leuk Esterase: Negative / RBC: 5-10 /HPF / WBC 0-2 /HPF   Sq Epi: x / Non Sq Epi: Few /HPF / Bacteria: Few /HPF        MEDICATIONS  (STANDING):    lidocaine   4% Patch 1 Patch Transdermal daily  montelukast 10 milliGRAM(s) Oral daily  piperacillin/tazobactam IVPB.. 3.375 Gram(s) IV Intermittent every 8 hours      RADIOLOGY & ADDITIONAL TESTS:    22: CT Abdomen and Pelvis w/ IV Cont (22 @ 01:24) Mild intrahepatic and extrahepatic biliary ductal dilatation with common   bile duct measuring up to 1 cm up to the level of the ampulla. Mild  enhancement of the walls of the bile ducts which raises concern for   cholangitis. Previously seen pneumobilia is no longer visualized. Subtle hyperdensity in the mid common bile duct which may represent   sludge/stones. Correlate with LFTs. MRI/MRCP should be considered.    Under distended gallbladder with air in the cystic duct and gallbladder.  Nonspecific pericholecystic fluid.      22: US Hepatic & Pancreatic (22 @ 10:03) The gallbladder is partially contracted.  No evidence of cholelithiasis or acute cholecystitis.        MICROBIOLOGY DATA:    COVID-19 PCR . (22 @ 03:13)   COVID-19 PCR: NotDetec:

## 2022-08-07 NOTE — PROGRESS NOTE ADULT - GASTROINTESTINAL
details… soft/nontender/nondistended/no guarding/no rigidity/no organomegaly/no palpable loki/no masses palpable

## 2022-08-07 NOTE — PROGRESS NOTE ADULT - NEUROLOGICAL
cranial nerves II-XII intact/sensation intact/responds to pain/responds to verbal commands/cranial nerves intact details…

## 2022-08-07 NOTE — PROGRESS NOTE ADULT - SUBJECTIVE AND OBJECTIVE BOX
[   ] ICU                                          [   ] CCU                                      [ X  ] Medical Floor    Patient is a 86 year old female with abdominal pain. GI consulted to evaluate.          Patient is a 86 y o female with  Past medical history significant for GERD, HTN, Chronic back pain, found to be bulging discs on MRI, incidental finding of unprovoked PE on the MRI, s/p several months of eliquis now DC'd, gal bladder calcification found on that CT angio, declined surgery 2018, was for OP f/u, later found with gall stone pancreatitis with CBD stone in 2019, s/p ERCP and removal of 1.2cm stone March 2019 complicated by pneumobilia, recent ED visit last month for abdominal pain, dc home due to no change in pneumobilia, now comes in due to 2 week hx of persistent,  constant, 10/10, cramping mid epigastric pain that does not radiate, no alleviating factors, but worse with food, a/w nausea, and NBNB emesis x4 in the ED. No chest pain, no shortness of breath, no palpitations, no changes in bowel or bladder function.    Patient is comfortable. No new complaints reported, No abdominal pain, N/V, hematemesis, hematochezia, melena, fever, chills, chest pain, SOB, cough or diarrhea reported.         PAIN MANAGEMENT:  Pain Scale:                 /10  Pain Location:      Prior Colonoscopy:  Unknown    PAST MEDICAL HISTORY  HTN (hypertension)  GERD   Pulmonary emboli  Gallstones        PAST SURGICAL HISTORY  Appendectomy         Allergies    No Known Drug Allergies  Seafood (Hives)    Intolerances  None         MEDICATIONS  (STANDING):  atorvastatin 20 milliGRAM(s) Oral at bedtime  dextrose 5% + sodium chloride 0.9%. 1000 milliLiter(s) (75 mL/Hr) IV Continuous <Continuous>  dextrose 5% + sodium chloride 0.9%. 1000 milliLiter(s) (75 mL/Hr) IV Continuous <Continuous>  montelukast 10 milliGRAM(s) Oral daily  piperacillin/tazobactam IVPB.. 3.375 Gram(s) IV Intermittent every 8 hours  potassium phosphate / sodium phosphate Tablet (K-PHOS No. 2) 1 Tablet(s) Oral two times a day    MEDICATIONS  (PRN):  acetaminophen     Tablet .. 650 milliGRAM(s) Oral every 6 hours PRN Moderate Pain (4 - 6)  ondansetron Injectable 4 milliGRAM(s) IV Push every 8 hours PRN Nausea and/or Vomiting      SOCIAL HISTORY  Advanced Directives:       [  ] Full Code       [ X ] DNR  Marital Status:         [  ] M      [ X ] S      [  ] D       [  ] W  Children:       [X  ] Yes      [  ] No  Occupation:        [  ] Employed       [ X ] Unemployed       [  ] Retired  Diet:       [ X ] Regular       [  ] PEG feeding          [  ] NG tube feeding  Drug Use:           [X  ] Patient denied          [  ] Yes  Alcohol:           [ X ] No             [  ] Yes (socially)         [  ] Yes (chronic)  Tobacco:           [  ] Yes           [ X ] No      FAMILY HISTORY  [ X ] Heart Disease            [ X ] Diabetes             [ X ] HTN             [  ] Colon Cancer             [  ] Stomach Cancer              [  ] Pancreatic Cancer      VITALS  Vital Signs Last 24 Hrs  T(C): 38.2 (07 Aug 2022 05:04), Max: 38.2 (07 Aug 2022 05:04)  T(F): 100.7 (07 Aug 2022 05:04), Max: 100.7 (07 Aug 2022 05:04)  HR: 66 (07 Aug 2022 05:04) (66 - 71)  BP: 129/56 (07 Aug 2022 05:04) (120/53 - 136/53)  BP(mean): 75 (06 Aug 2022 13:10) (75 - 75)  RR: 18 (07 Aug 2022 05:04) (18 - 18)  SpO2: 93% (07 Aug 2022 05:04) (90% - 96%)  Parameters below as of 07 Aug 2022 05:04  Patient On (Oxygen Delivery Method): room air       MEDICATIONS  (STANDING):  lidocaine   4% Patch 1 Patch Transdermal daily  montelukast 10 milliGRAM(s) Oral daily  piperacillin/tazobactam IVPB.. 3.375 Gram(s) IV Intermittent every 8 hours    MEDICATIONS  (PRN):  acetaminophen     Tablet .. 650 milliGRAM(s) Oral every 6 hours PRN Moderate Pain (4 - 6)  acetaminophen     Tablet .. 650 milliGRAM(s) Oral every 6 hours PRN Temp greater or equal to 38C (100.4F)  ondansetron Injectable 4 milliGRAM(s) IV Push every 8 hours PRN Nausea and/or Vomiting                            11.9   7.73  )-----------( 130      ( 06 Aug 2022 11:56 )             37.1       08-06    142  |  110<H>  |  11  ----------------------------<  91  3.6   |  25  |  0.76    Ca    8.8      06 Aug 2022 11:56  Phos  2.7     08-06  Mg     2.1     08-06    TPro  6.8  /  Alb  2.6<L>  /  TBili  4.2<H>  /  DBili  x   /  AST  289<H>  /  ALT  397<H>  /  AlkPhos  262<H>  08-06      PT/INR - ( 06 Aug 2022 11:56 )   PT: 17.2 sec;   INR: 1.44 ratio         PTT - ( 06 Aug 2022 11:56 )  PTT:27.2 sec

## 2022-08-08 ENCOUNTER — TRANSCRIPTION ENCOUNTER (OUTPATIENT)
Age: 87
End: 2022-08-08

## 2022-08-08 DIAGNOSIS — K80.50 CALCULUS OF BILE DUCT WITHOUT CHOLANGITIS OR CHOLECYSTITIS WITHOUT OBSTRUCTION: ICD-10-CM

## 2022-08-08 DIAGNOSIS — D49.0 NEOPLASM OF UNSPECIFIED BEHAVIOR OF DIGESTIVE SYSTEM: ICD-10-CM

## 2022-08-08 DIAGNOSIS — Z02.9 ENCOUNTER FOR ADMINISTRATIVE EXAMINATIONS, UNSPECIFIED: ICD-10-CM

## 2022-08-08 LAB
ALBUMIN SERPL ELPH-MCNC: 2.2 G/DL — LOW (ref 3.5–5)
ALP SERPL-CCNC: 211 U/L — HIGH (ref 40–120)
ALT FLD-CCNC: 168 U/L DA — HIGH (ref 10–60)
ANION GAP SERPL CALC-SCNC: 9 MMOL/L — SIGNIFICANT CHANGE UP (ref 5–17)
AST SERPL-CCNC: 47 U/L — HIGH (ref 10–40)
BILIRUB SERPL-MCNC: 1.3 MG/DL — HIGH (ref 0.2–1.2)
BUN SERPL-MCNC: 9 MG/DL — SIGNIFICANT CHANGE UP (ref 7–18)
CALCIUM SERPL-MCNC: 8.8 MG/DL — SIGNIFICANT CHANGE UP (ref 8.4–10.5)
CHLORIDE SERPL-SCNC: 105 MMOL/L — SIGNIFICANT CHANGE UP (ref 96–108)
CO2 SERPL-SCNC: 25 MMOL/L — SIGNIFICANT CHANGE UP (ref 22–31)
CREAT SERPL-MCNC: 0.68 MG/DL — SIGNIFICANT CHANGE UP (ref 0.5–1.3)
EGFR: 85 ML/MIN/1.73M2 — SIGNIFICANT CHANGE UP
GLUCOSE SERPL-MCNC: 82 MG/DL — SIGNIFICANT CHANGE UP (ref 70–99)
HCT VFR BLD CALC: 32.6 % — LOW (ref 34.5–45)
HGB BLD-MCNC: 10.9 G/DL — LOW (ref 11.5–15.5)
MCHC RBC-ENTMCNC: 28.8 PG — SIGNIFICANT CHANGE UP (ref 27–34)
MCHC RBC-ENTMCNC: 33.4 GM/DL — SIGNIFICANT CHANGE UP (ref 32–36)
MCV RBC AUTO: 86 FL — SIGNIFICANT CHANGE UP (ref 80–100)
NRBC # BLD: 0 /100 WBCS — SIGNIFICANT CHANGE UP (ref 0–0)
PLATELET # BLD AUTO: 134 K/UL — LOW (ref 150–400)
POTASSIUM SERPL-MCNC: 3.3 MMOL/L — LOW (ref 3.5–5.3)
POTASSIUM SERPL-SCNC: 3.3 MMOL/L — LOW (ref 3.5–5.3)
PROT SERPL-MCNC: 5.8 G/DL — LOW (ref 6–8.3)
RBC # BLD: 3.79 M/UL — LOW (ref 3.8–5.2)
RBC # FLD: 13.5 % — SIGNIFICANT CHANGE UP (ref 10.3–14.5)
SODIUM SERPL-SCNC: 139 MMOL/L — SIGNIFICANT CHANGE UP (ref 135–145)
WBC # BLD: 5.46 K/UL — SIGNIFICANT CHANGE UP (ref 3.8–10.5)
WBC # FLD AUTO: 5.46 K/UL — SIGNIFICANT CHANGE UP (ref 3.8–10.5)

## 2022-08-08 PROCEDURE — 43264 ERCP REMOVE DUCT CALCULI: CPT

## 2022-08-08 PROCEDURE — 99222 1ST HOSP IP/OBS MODERATE 55: CPT | Mod: 25

## 2022-08-08 PROCEDURE — 43262 ENDO CHOLANGIOPANCREATOGRAPH: CPT | Mod: 59

## 2022-08-08 DEVICE — STENT BIL COTN-L 10FRX9CM: Type: IMPLANTABLE DEVICE | Status: FUNCTIONAL

## 2022-08-08 DEVICE — STENT BIL COTN-L 10FRX7CM: Type: IMPLANTABLE DEVICE | Status: FUNCTIONAL

## 2022-08-08 DEVICE — STENT BIL COTN-L 10FRX12CM: Type: IMPLANTABLE DEVICE | Status: FUNCTIONAL

## 2022-08-08 DEVICE — BLLN EXTRCTR PRO RX-S 12-15MM: Type: IMPLANTABLE DEVICE | Status: FUNCTIONAL

## 2022-08-08 DEVICE — JAGWIRE HYDRA STR .035 X 260CM: Type: IMPLANTABLE DEVICE | Status: FUNCTIONAL

## 2022-08-08 DEVICE — STENT BIL COTN-L 10FRX10CM: Type: IMPLANTABLE DEVICE | Status: FUNCTIONAL

## 2022-08-08 DEVICE — GWIRE JAGTOME REVOLUTION RX 260CM/0.025IN: Type: IMPLANTABLE DEVICE | Status: FUNCTIONAL

## 2022-08-08 DEVICE — HYDRATOME 44: Type: IMPLANTABLE DEVICE | Status: FUNCTIONAL

## 2022-08-08 DEVICE — DREAMWIRE STD .035IN STRT: Type: IMPLANTABLE DEVICE | Status: FUNCTIONAL

## 2022-08-08 DEVICE — STENT BIL WALL RX 10FRX7CM: Type: IMPLANTABLE DEVICE | Status: FUNCTIONAL

## 2022-08-08 DEVICE — STENT BIL OASIS 1ACT 11.5FR INTRO SYS: Type: IMPLANTABLE DEVICE | Status: FUNCTIONAL

## 2022-08-08 DEVICE — CATH 3 LUMEN EXTRACTIN BALLOON 15MM: Type: IMPLANTABLE DEVICE | Status: FUNCTIONAL

## 2022-08-08 RX ORDER — POTASSIUM CHLORIDE 20 MEQ
10 PACKET (EA) ORAL
Refills: 0 | Status: COMPLETED | OUTPATIENT
Start: 2022-08-08 | End: 2022-08-08

## 2022-08-08 RX ORDER — BENZOCAINE AND MENTHOL 5; 1 G/100ML; G/100ML
1 LIQUID ORAL THREE TIMES A DAY
Refills: 0 | Status: DISCONTINUED | OUTPATIENT
Start: 2022-08-08 | End: 2022-08-10

## 2022-08-08 RX ORDER — INDOMETHACIN 50 MG
50 CAPSULE ORAL ONCE
Refills: 0 | Status: DISCONTINUED | OUTPATIENT
Start: 2022-08-08 | End: 2022-08-08

## 2022-08-08 RX ORDER — INDOMETHACIN 50 MG
100 CAPSULE ORAL ONCE
Refills: 0 | Status: COMPLETED | OUTPATIENT
Start: 2022-08-08 | End: 2022-08-08

## 2022-08-08 RX ORDER — SODIUM CHLORIDE 9 MG/ML
1000 INJECTION, SOLUTION INTRAVENOUS
Refills: 0 | Status: DISCONTINUED | OUTPATIENT
Start: 2022-08-08 | End: 2022-08-10

## 2022-08-08 RX ORDER — POTASSIUM CHLORIDE 20 MEQ
10 PACKET (EA) ORAL
Refills: 0 | Status: DISCONTINUED | OUTPATIENT
Start: 2022-08-08 | End: 2022-08-08

## 2022-08-08 RX ADMIN — LIDOCAINE 1 PATCH: 4 CREAM TOPICAL at 12:22

## 2022-08-08 RX ADMIN — PIPERACILLIN AND TAZOBACTAM 25 GRAM(S): 4; .5 INJECTION, POWDER, LYOPHILIZED, FOR SOLUTION INTRAVENOUS at 21:27

## 2022-08-08 RX ADMIN — Medication 100 MILLIGRAM(S): at 14:25

## 2022-08-08 RX ADMIN — Medication 100 MILLIEQUIVALENT(S): at 12:19

## 2022-08-08 RX ADMIN — Medication 100 MILLIEQUIVALENT(S): at 17:29

## 2022-08-08 RX ADMIN — Medication 100 MILLIGRAM(S): at 13:55

## 2022-08-08 RX ADMIN — LIDOCAINE 1 PATCH: 4 CREAM TOPICAL at 20:09

## 2022-08-08 RX ADMIN — Medication 100 MILLIEQUIVALENT(S): at 18:40

## 2022-08-08 RX ADMIN — LIDOCAINE 1 PATCH: 4 CREAM TOPICAL at 00:30

## 2022-08-08 RX ADMIN — PIPERACILLIN AND TAZOBACTAM 25 GRAM(S): 4; .5 INJECTION, POWDER, LYOPHILIZED, FOR SOLUTION INTRAVENOUS at 06:10

## 2022-08-08 NOTE — CONSULT NOTE ADULT - SUBJECTIVE AND OBJECTIVE BOX
General Surgery Consultation Note    Patient is a 86y old  Female who presents with a chief complaint of Abdominal pain (08 Aug 2022 17:15)      HPI:  Hx obtained by daughter at bedside  Patient is a 86 y o female with  Hx of GERD, HTN, Chronic back pain, found to be bulging discs on MRI, incidental finding of unprovoked PE on the MRI, s/p several months of eliquis now DC'd, gal bladder calcification found on that CT angio, declined surgery 2018, was for OP f/u, later found with gall stone pancreatitits, with CBD stone in 2019, s/p ERCP and removal of 1.2cm stone March 2019 complicated by pneumobilia, recent ED visit last month for abdominal pain, dc home due to no change in pneumobilia, now comes in due to 2 week hx of persistent,  constant, 10/10, cramping mid epigastric pain that does not radiate, no alleviating factors, but worse with food, a/w nausea, and NBNB emesis x4 in the ED. No chest pain, no shortness of breath, no palpitations, no changes in bowel or bladder function.    In the ED: HD stable, sats fluctuate between mid 90s-high 80s, Afebrile, HR stable  Exam: tenderness in the epigastric region  labs: k of 3.4, transaminitis slightly worsened from prior ED visit, hematuria (microscopic)  CT abdomen and pelvis shows cholangitis with resolution of pneumobilia but dilatation of the cbd to 1cm (05 Aug 2022 05:37)    INTERVAL HPI:  85yo F PMH GERD, HTN, gallstone pancreatitis with choledocholithiasis s/p ERCP 2019 admitted for cholangitis s/p ERCP today. General surgery consulted for evaluation for laparoscopic cholecystectomy.  Pt hard of hearing. History obtained from daughter Anastasia ([phone]). As above, pt with mid epigastric pain x 2 weeks worse with eating. +nausea and vomiting.  NO fevers or chills.  Per daughter, would like patient to have surgery.     PAST MEDICAL & SURGICAL HISTORY:  HTN (hypertension)      GERD (gastroesophageal reflux disease)      Pulmonary emboli      Gallstones      S/P appendectomy      History of ERCP          Allergies    No Known Drug Allergies  Seafood (Hives)    Intolerances        MEDICATIONS  (STANDING):  lactated ringers. 1000 milliLiter(s) (75 mL/Hr) IV Continuous <Continuous>  lidocaine   4% Patch 1 Patch Transdermal daily  montelukast 10 milliGRAM(s) Oral daily  piperacillin/tazobactam IVPB.. 3.375 Gram(s) IV Intermittent every 8 hours    MEDICATIONS  (PRN):  acetaminophen     Tablet .. 650 milliGRAM(s) Oral every 6 hours PRN Moderate Pain (4 - 6)  acetaminophen     Tablet .. 650 milliGRAM(s) Oral every 6 hours PRN Temp greater or equal to 38C (100.4F)  ondansetron Injectable 4 milliGRAM(s) IV Push every 8 hours PRN Nausea and/or Vomiting      Vital Signs Last 24 Hrs  T(C): 36.6 (08 Aug 2022 15:29), Max: 36.9 (07 Aug 2022 20:11)  T(F): 97.9 (08 Aug 2022 15:29), Max: 98.4 (07 Aug 2022 20:11)  HR: 82 (08 Aug 2022 16:24) (59 - 89)  BP: 145/81 (08 Aug 2022 16:24) (123/59 - 145/81)  BP(mean): --  RR: 18 (08 Aug 2022 16:24) (16 - 20)  SpO2: 98% (08 Aug 2022 16:24) (95% - 98%)    Parameters below as of 08 Aug 2022 05:09  Patient On (Oxygen Delivery Method): room air        Physical Exam:  Gen: awake, alert oriented NAD  HEENT: anicteric  Abd: soft NT ND  Ext: warm to touch no c/c/e    Labs:                          10.9   5.46  )-----------( 134      ( 08 Aug 2022 06:35 )             32.6     08-08    139  |  105  |  9   ----------------------------<  82  3.3<L>   |  25  |  0.68    Ca    8.8      08 Aug 2022 06:35    TPro  5.8<L>  /  Alb  2.2<L>  /  TBili  1.3<H>  /  DBili  x   /  AST  47<H>  /  ALT  168<H>  /  AlkPhos  211<H>  08-08      Radiological Exams:  < from: CT Abdomen and Pelvis w/ IV Cont (08.05.22 @ 01:24) >  IMPRESSION:  Mild intrahepatic and extrahepatic biliary ductal dilatation with common   bile duct measuring up to 1 cm up to the level of the ampulla. Mild   enhancement of thewalls of the bile ducts which raises concern for   cholangitis. Previously seen pneumobilia is no longer visualized. Subtle   hyperdensity in the mid common bile duct which may represent   sludge/stones. Correlate with LFTs. MRI/MRCP should be considered.    < end of copied text >  < from: US Hepatic & Pancreatic (08.05.22 @ 10:03) >    ACC: [id number] EXAM:  US LIVER AND PANCREAS                          PROCEDURE DATE:  08/05/2022          INTERPRETATION:  CLINICAL INFORMATION: Upper abdominal pain.    COMPARISON: Abdominal ultrasound from 06/08/2018.    TECHNIQUE: Sonography of the right upper quadrant.    FINDINGS:    Liver: Within normal limits.  Bile ducts: The partially visualized proximal  extra-hepatic duct   measures 7 mm, not significantly changed in size from 6 mm on 06/08/2018.  Gallbladder: Partially contracted.  No evidence of gallstones.  Pancreas: Obscured by bowel gas and not diagnostically assessed.  Right kidney: 9.9 cm.. No hydronephrosis.  Ascites: None.  IVC: Visualized portions are within normal limits.    IMPRESSION:    The gallbladder is partially contracted.  No evidence of cholelithiasis   or acute cholecystitis.    --- End of Report ---      < end of copied text >  < from: MR MRCP w/wo IV Cont (08.05.22 @ 17:26) >    IMPRESSION:  Choledocholithiasis with possible cholangitis.    < end of copied text >

## 2022-08-08 NOTE — CONSULT NOTE ADULT - ASSESSMENT
83yo F with PMH HTN, GERD s/p ERCP today for cholangitis. Previous h/o gallstone pancreatitis with CBD stone s/p ERCP with sphincterotomy, no stent  1. Plan for lap luis in this admission  2. Please provide medical clearance  3. Will d/w Dr. Trent. 
Patient is a 86 y o female with  Hx of GERD, HTN, Chronic back pain, found to be bulging discs on MRI, incidental finding of unprovoked PE on the MRI, s/p several months of eliquis now DC'd, gal bladder calcification found on that CT angio in 2018. Admitted wtih 2 week hx of persistent,  constant, 10/10, cramping mid epigastric pain . GI consulted for CBD dilation and obstruction. 
1. Abdominal pain  2. Gall stone  3. Cholangitis  4. Dilated biliary ducts    Suggestions:    1. Follow up LFT's  2. Antibiotics IV  3. ID evaluation  4. ERCP  5. Protonix daily  6. DVT prophylaxis
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 On admission, she found to have low grade fever, Leukocytosis and elevated Transaminitis. The Ct abd/pelvis shows Mild intrahepatic and extrahepatic biliary ductal dilatation with common bile duct measuring up to 1 cm up to the level of the ampulla. Mild  enhancement of the walls of the bile ducts which raises concern for  cholangitis. Previously seen pneumobilia is no longer visualized. She has started on Zosyn and the ID consult requested to assist with further evaluation and antibiotic management.     # Sepsis ( Low grade fever + Leukocytosis)  # Cholangitis - on CT abd/pelvis    would recommend:    1. Please obtain Blood cultures X 2  2. Trend LFts  and WBC count  3. GI evaluation  4. Continue Zosyn until work up is done    will follow the patient with you and make further recommendation based on the clinical course and Lab results  Thank you for the opportunity to participate in Ms. MELCHOR's care    Attending Attestation:    Spent more than 65 minutes on total encounter, more than 50 % of the visit was spent counseling and/or coordinating care by the Attending physician.  Complexity:  - Sepsis  - Cholangitis  - Gall bladder stone

## 2022-08-08 NOTE — PROGRESS NOTE ADULT - ASSESSMENT
Hx obtained by daughter at bedside  Patient is a 86 y o female with  Hx of GERD, HTN, Chronic back pain, found to be bulging discs on MRI, incidental finding of unprovoked PE on the MRI, s/p several months of eliquis now DC'd, gal bladder calcification found on that CT angio, declined surgery 2018, was for OP f/u, later found with gall stone pancreatitits, with CBD stone in 2019, s/p ERCP and removal of 1.2cm stone March 2019 complicated by pneumobilia, recent ED visit last month for abdominal pain, dc home due to no change in pneumobilia, now comes in due to 2 week hx of persistent,  constant, abdominal pain, CT abdomen and pelvis shows cholangitis with resolution of pneumobilia but dilatation of the cbd to 1cm: now admitted for cholangitis.   08/08: Plan for ERCP today. NPO. On Zosyn. Hypokalemia and replaced. Asymptomatic at this time.

## 2022-08-08 NOTE — PROGRESS NOTE ADULT - ASSESSMENT
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 On admission, she found to have low grade fever, Leukocytosis and elevated Transaminitis. The Ct abd/pelvis shows Mild intrahepatic and extrahepatic biliary ductal dilatation with common bile duct measuring up to 1 cm up to the level of the ampulla. Mild  enhancement of the walls of the bile ducts which raises concern for  cholangitis. Previously seen pneumobilia is no longer visualized. She has started on Zosyn and the ID consult requested to assist with further evaluation and antibiotic management.     # Sepsis ( Low grade fever + Leukocytosis)  # Cholangitis - on CT abd/pelvis  # Transaminitis- improving, ? transient  obstruction     would recommend:    1.   2. Trend LFts , trending down   3. Continue Zosyn until work up is done  4. OOB to chair     Attending Attestation:    Spent more than 35 minutes on total encounter, more than 50 % of the visit was spent counseling and/or coordinating care by the Attending physician.  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 On admission, she found to have low grade fever, Leukocytosis and elevated Transaminitis. The Ct abd/pelvis shows Mild intrahepatic and extrahepatic biliary ductal dilatation with common bile duct measuring up to 1 cm up to the level of the ampulla. Mild  enhancement of the walls of the bile ducts which raises concern for  cholangitis. Previously seen pneumobilia is no longer visualized. She has started on Zosyn and the ID consult requested to assist with further evaluation and antibiotic management.     # Sepsis ( Low grade fever + Leukocytosis)  # Cholangitis - on CT abd/pelvis  # Transaminitis- improving, ? transient  obstruction     would recommend:    1. Post-procedure labs  2. Follow up ERCP findings   3. Trend LFts , trending down   4. Continue Zosyn until work up is done    Attending Attestation:    Spent more than 35 minutes on total encounter, more than 50 % of the visit was spent counseling and/or coordinating care by the Attending physician.  Complexity:  - Sepsis  - Cholangitis  - Gall bladder stone

## 2022-08-08 NOTE — CONSULT NOTE ADULT - SUBJECTIVE AND OBJECTIVE BOX
INIITIAL GI CONSULTATION    Patient is a 86y old  Female who presents with a chief complaint of Abdominal pain (08 Aug 2022 08:49)    HPI:  Hx obtained by daughter at bedside  Patient is a 86 y o female with  Hx of GERD, HTN, Chronic back pain, found to be bulging discs on MRI, incidental finding of unprovoked PE on the MRI, s/p several months of eliquis now DC'd, gal bladder calcification found on that CT angio, declined surgery , was for OP f/u, later found with gall stone pancreatitits, with CBD stone in , s/p ERCP and removal of 1.2cm stone 2019 complicated by pneumobilia, recent ED visit last month for abdominal pain, dc home due to no change in pneumobilia, now comes in due to 2 week hx of persistent,  constant, 10/10, cramping mid epigastric pain that does not radiate, no alleviating factors, but worse with food, a/w nausea, and NBNB emesis x4 in the ED. No chest pain, no shortness of breath, no palpitations, no changes in bowel or bladder function.    In the ED: HD stable, sats fluctuate between mid 90s-high 80s, Afebrile, HR stable  Exam: tenderness in the epigastric region  labs: k of 3.4, transaminitis slightly worsened from prior ED visit, hematuria (microscopic)  CT abdomen and pelvis shows cholangitis with resolution of pneumobilia but dilatation of the cbd to 1cm (05 Aug 2022 05:37)    GI HPI   Patient is a 86 y o female with  Hx of GERD, HTN, Chronic back pain, found to be bulging discs on MRI, incidental finding of unprovoked PE on the MRI, s/p several months of eliquis now DC'd, gal bladder calcification found on that CT angio in . Admitted wtih 2 week hx of persistent,  constant, 10/10, cramping mid epigastric pain . GI consulted for CBD dilation and obstruction. In short patient still has her gallbladder. Refused to have it removed in , was for OP f/u, later found with gall stone pancreatitis with CBD stone in , s/p ERCP and removal of 1.2cm stone 2019 complicated by pneumobilia, recent ED visit last month for abdominal pain, dc home due to no change in pneumobilia. Lab significant for HH 10.9/32.6  Tbili 1.3  ASt 47 . MRCP showed Choledocholithiasis with possible cholangitis and CBD dilation of 11mm. Patient seen and examined at bedside. Hard of hearing. Patient denies abdominal pain. Abdomen non distended and no tender. Urine yellow. None jaundice.   Melissa Espana  [id number]      PMH/PSH:  PAST MEDICAL & SURGICAL HISTORY:  HTN (hypertension)      GERD (gastroesophageal reflux disease)      Pulmonary emboli      Gallstones      S/P appendectomy      History of ERCP        FH:  FAMILY HISTORY:  Family history of diabetes mellitus  Mother         MEDS:  MEDICATIONS  (STANDING):  indomethacin Suppository 100 milliGRAM(s) Rectal once  lidocaine   4% Patch 1 Patch Transdermal daily  montelukast 10 milliGRAM(s) Oral daily  piperacillin/tazobactam IVPB.. 3.375 Gram(s) IV Intermittent every 8 hours    MEDICATIONS  (PRN):  acetaminophen     Tablet .. 650 milliGRAM(s) Oral every 6 hours PRN Moderate Pain (4 - 6)  acetaminophen     Tablet .. 650 milliGRAM(s) Oral every 6 hours PRN Temp greater or equal to 38C (100.4F)  ondansetron Injectable 4 milliGRAM(s) IV Push every 8 hours PRN Nausea and/or Vomiting    Allergies    No Known Drug Allergies  Seafood (Hives)    Intolerances            CONSTITUTIONAL:  No weight loss, fever, chills, weakness or fatigue.  HEENT:  Eyes:  No visual loss, blurred vision, double vision or yellow sclerae. Ears, Nose, Throat:  No hearing loss, sneezing, congestion, runny nose or sore throat.  SKIN:  No rash or itching.  CARDIOVASCULAR:  No chest pain, chest pressure or chest discomfort. No palpitations or edema.  RESPIRATORY:  No shortness of breath, cough or sputum.  GASTROINTESTINAL:  SEE HPI  GENITOURINARY:  No dysuria, hematuria, urinary frequency  NEUROLOGICAL:  No headache, dizziness, syncope, paralysis, ataxia, numbness or tingling in the extremities. No change in bowel or bladder control.  MUSCULOSKELETAL:  No muscle, back pain, joint pain or stiffness.        ______________________________________________________________________  PHYSICAL EXAM:  T(C): 36.8 (22 @ 05:09), Max: 37.3 (22 @ 13:27)  HR: 68 (22 @ 05:09)  BP: 136/65 (22 @ 05:09)  RR: 17 (22 @ 05:09)  SpO2: 95% (22 @ 05:09)  Wt(kg): --      GEN: NAD, normocephalic  CVS: S1S2+  CHEST: clear to auscultation  ABD: soft , nontender, nondistended, bowel sounds present  EXTR: no cyanosis, no clubbing, no edema  NEURO: Awake and alert; oriented x3  SKIN:  warm;  non icteric    ______________________________________________________________________  LABS:                        10.9   5.46  )-----------( 134      ( 08 Aug 2022 06:35 )             32.6         139  |  105  |  9   ----------------------------<  82  3.3<L>   |  25  |  0.68    Ca    8.8      08 Aug 2022 06:35  Phos  2.7       Mg     2.1     -    TPro  5.8<L>  /  Alb  2.2<L>  /  TBili  1.3<H>  /  DBili  x   /  AST  47<H>  /  ALT  168<H>  /  AlkPhos  211<H>      LIVER FUNCTIONS - ( 08 Aug 2022 06:35 )  Alb: 2.2 g/dL / Pro: 5.8 g/dL / ALK PHOS: 211 U/L / ALT: 168 U/L DA / AST: 47 U/L / GGT: x           PT/INR - ( 06 Aug 2022 11:56 )   PT: 17.2 sec;   INR: 1.44 ratio         PTT - ( 06 Aug 2022 11:56 )  PTT:27.2 sec  ____________________________________________    IMAGING:    ______________________________________________________________________  < from: MR MRCP w/wo IV Cont (22 @ 17:26) >    ACC: [id number] EXAM:  MR MRCP WAW IC                          PROCEDURE DATE:  2022          INTERPRETATION:  CLINICAL INFORMATION: Question of cholangitis and   biliary stones on recent CT.    COMPARISON: 2022.    CONTRAST/COMPLICATIONS:  IV Contrast: Gadavist  7 cc administered   0.5 cc discarded  Oral Contrast: NONE  Complications: None reported at time of study completion    PROCEDURE:  MRI of the abdomen was performed.  MRCP was performed.    FINDINGS:  LOWER CHEST: Bibasilar subsegmental linear atelectasis. Mild   cardiomegaly. Small hiatal hernia.    LIVER: Within normal limits.  BILE DUCTS: Mild biliary ductal dilatation with CBD measuring up to 11 mm   with multiple stones in mid to distal lumen. Mild enhancement of the wall   of the extrahepatic bile duct.  GALLBLADDER: Contracted and contains a focus of air.  SPLEEN: Scattered cysts.  PANCREAS: Innumerable tiny side branch IPMNs.  ADRENALS: Within normal limits.  KIDNEYS/URETERS: Left renal cyst.    VISUALIZED PORTIONS:  BOWEL: Within normal limits.  PERITONEUM: No ascites.  VESSELS: Separate origin of common hepatic artery from the aorta.  RETROPERITONEUM/LYMPH NODES: No lymphadenopathy.  ABDOMINAL WALL: Within normal limits.  BONES: Within normal limits.    IMPRESSION:  Choledocholithiasis with possible cholangitis.        --- End of Report ---      < end of copied text >

## 2022-08-08 NOTE — CONSULT NOTE ADULT - PROBLEM SELECTOR RECOMMENDATION 2
incentive spirometry  Bronchodilators  chest pt
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  Patient denies any unintentional weight. Will need to follow up with MRI in 6 months as surveillance.

## 2022-08-08 NOTE — CONSULT NOTE ADULT - PROBLEM SELECTOR RECOMMENDATION 9
P/W abdominal pain . Noted to have transaminitis. Lab significant for HH 10.9/32.6  Tbili 1.3  ASt 47 . MRCP showed Choledocholithiasis with possible cholangitis and CBD dilation of 11mm.   -ERCP tentative today   -NPO   -Hold all AC for 24 hours  -covid swab within 72 hours
Panculture  antibiotics  ID consult  Surgical consult  GI consult  MRCP

## 2022-08-08 NOTE — PROGRESS NOTE ADULT - SUBJECTIVE AND OBJECTIVE BOX
Patient is a 86y old  Female who presents with a chief complaint of Abdominal pain (07 Aug 2022 14:40)    pt seen in icu [  ], reg med floor [ x  ], bed [ x ], chair at bedside [   ], a+o x3 [ x ], lethargic [  ],  nad [ x ]  Allergies    No Known Drug Allergies  Seafood (Hives)        Vitals    T(F): 98.2 (08-08-22 @ 05:09), Max: 99.2 (08-07-22 @ 13:27)  HR: 68 (08-08-22 @ 05:09) (59 - 68)  BP: 136/65 (08-08-22 @ 05:09) (130/58 - 145/55)  RR: 17 (08-08-22 @ 05:09) (17 - 18)  SpO2: 95% (08-08-22 @ 05:09) (93% - 95%)  Wt(kg): --  CAPILLARY BLOOD GLUCOSE          Labs                          10.9   5.46  )-----------( 134      ( 08 Aug 2022 06:35 )             32.6       08-08    139  |  105  |  9   ----------------------------<  82  3.3<L>   |  25  |  0.68    Ca    8.8      08 Aug 2022 06:35  Phos  2.7     08-06  Mg     2.1     08-06    TPro  5.8<L>  /  Alb  2.2<L>  /  TBili  1.3<H>  /  DBili  x   /  AST  47<H>  /  ALT  168<H>  /  AlkPhos  211<H>  08-08            .Blood Blood-Peripheral  08-06 @ 12:06   No growth to date.  --  --      .Blood Blood-Peripheral  08-06 @ 11:56   No growth to date.  --  --      Clean Catch Clean Catch (Midstream)  08-05 @ 01:48   <10,000 CFU/mL Normal Urogenital [place]  --  --        Radiology Results      Meds    MEDICATIONS  (STANDING):  indomethacin Suppository 100 milliGRAM(s) Rectal once  lidocaine   4% Patch 1 Patch Transdermal daily  montelukast 10 milliGRAM(s) Oral daily  piperacillin/tazobactam IVPB.. 3.375 Gram(s) IV Intermittent every 8 hours      MEDICATIONS  (PRN):  acetaminophen     Tablet .. 650 milliGRAM(s) Oral every 6 hours PRN Moderate Pain (4 - 6)  acetaminophen     Tablet .. 650 milliGRAM(s) Oral every 6 hours PRN Temp greater or equal to 38C (100.4F)  ondansetron Injectable 4 milliGRAM(s) IV Push every 8 hours PRN Nausea and/or Vomiting      Physical Exam    Neuro :  no focal deficits  Respiratory: CTA B/L  CV: RRR, S1S2, no murmurs,   Abdominal: Soft, NT, ND +BS,  Extremities: No edema, + peripheral pulses    ASSESSMENT    sepsis   cholangitis,   choledocholithiasis,   atelectasis  h/o hypertension,   GERD,   gallstones,   PE (not currently on blood thinners)  appendectomy   HTN (hypertension)        PLAN    mrcp with Choledocholithiasis with possible cholangitis noted   cont zosyn,   id f/u   blood and ucx neg noted above   wbc wnl   gi f/u   f/u ERCP   lft improving  Protonix daily   pulm f/u   incentive spirometry  Bronchodilators  chest pt   completed her treatment for PE   phys tx eval noted and rec phys tx 3-5x/week x 2-3 weeks at home w/ home PT   cont current meds

## 2022-08-08 NOTE — PROGRESS NOTE ADULT - SUBJECTIVE AND OBJECTIVE BOX
Patient is seen and examined at the bed side, is afebrile. The blood cultures from 22 have no growth to date.       REVIEW OF SYSTEMS: All other review systems are negative      ALLERGIES: No Known Drug Allergies  Seafood (Hives)      Vital Signs Last 24 Hrs  T(C): 36.8 (08 Aug 2022 05:09), Max: 36.9 (07 Aug 2022 20:11)  T(F): 98.2 (08 Aug 2022 05:09), Max: 98.4 (07 Aug 2022 20:11)  HR: 68 (08 Aug 2022 05:09) (59 - 68)  BP: 136/65 (08 Aug 2022 05:09) (136/65 - 145/55)  BP(mean): --  RR: 17 (08 Aug 2022 05:09) (17 - 18)  SpO2: 95% (08 Aug 2022 05:09) (95% - 95%)    Parameters below as of 08 Aug 2022 05:09  Patient On (Oxygen Delivery Method): room air        PHYSICAL EXAM:  GENERAL: Not in distress   CHEST/LUNG:  Not using accessory muscles   HEART: s1 and s2 present  ABDOMEN:  ? Tenderness resolved   EXTREMITIES: No pedal  edema  CNS: Awake and Alert      LABS:                        10.9   5.46  )-----------( 134      ( 08 Aug 2022 06:35 )             32.6                12.2   15.73 )-----------( 149      ( 05 Aug 2022 10:13 )             37.6             139  |  105  |  9   ----------------------------<  82  3.3<L>   |  25  |  0.68    Ca    8.8      08 Aug 2022 06:35    TPro  5.8<L>  /  Alb  2.2<L>  /  TBili  1.3<H>  /  DBili  x   /  AST  47<H>  /  ALT  168<H>  /  AlkPhos  211<H>  08    08    142  |  110<H>  |  11  ----------------------------<  91  3.6   |  25  |  0.76    Ca    8.8      06 Aug 2022 11:56  Phos  2.7     08-  Mg     2.1     -    TPro  6.8  /  Alb  2.6<L>  /  TBili  4.2<H>  /  DBili  x   /  AST  289<H>  /  ALT  397<H>  /  AlkPhos  262<H>          Urinalysis Basic - ( 05 Aug 2022 01:48 )  Color: Yellow / Appearance: Clear / S.015 / pH: x  Gluc: x / Ketone: Negative  / Bili: Negative / Urobili: 4   Blood: x / Protein: 30 mg/dL / Nitrite: Negative   Leuk Esterase: Negative / RBC: 5-10 /HPF / WBC 0-2 /HPF   Sq Epi: x / Non Sq Epi: Few /HPF / Bacteria: Few /HPF        MEDICATIONS  (STANDING):    lidocaine   4% Patch 1 Patch Transdermal daily  montelukast 10 milliGRAM(s) Oral daily  piperacillin/tazobactam IVPB.. 3.375 Gram(s) IV Intermittent every 8 hours  potassium chloride  10 mEq/100 mL IVPB 10 milliEquivalent(s) IV Intermittent every 1 hour        RADIOLOGY & ADDITIONAL TESTS:    22: CT Abdomen and Pelvis w/ IV Cont (22 @ 01:24) Mild intrahepatic and extrahepatic biliary ductal dilatation with common   bile duct measuring up to 1 cm up to the level of the ampulla. Mild  enhancement of the walls of the bile ducts which raises concern for   cholangitis. Previously seen pneumobilia is no longer visualized. Subtle hyperdensity in the mid common bile duct which may represent   sludge/stones. Correlate with LFTs. MRI/MRCP should be considered.    Under distended gallbladder with air in the cystic duct and gallbladder.  Nonspecific pericholecystic fluid.      22: US Hepatic & Pancreatic (22 @ 10:03) The gallbladder is partially contracted.  No evidence of cholelithiasis or acute cholecystitis.        MICROBIOLOGY DATA:    Culture - Blood (22 @ 12:06)   Specimen Source: .Blood Blood-Peripheral   Culture Results: No growth to date.     Culture - Blood (22 @ 11:56)   Specimen Source: .Blood Blood-Peripheral   Culture Results: No growth to date.     COVID-19 PCR . (22 @ 03:13)   COVID-19 PCR: NotDetec:           Patient is seen and examined at the bed side, is afebrile. The blood cultures from 22 have no growth to date. She is scheduled for ERCP today.       REVIEW OF SYSTEMS: All other review systems are negative      ALLERGIES: No Known Drug Allergies  Seafood (Hives)      Vital Signs Last 24 Hrs  T(C): 36.8 (08 Aug 2022 05:09), Max: 36.9 (07 Aug 2022 20:11)  T(F): 98.2 (08 Aug 2022 05:09), Max: 98.4 (07 Aug 2022 20:11)  HR: 68 (08 Aug 2022 05:09) (59 - 68)  BP: 136/65 (08 Aug 2022 05:09) (136/65 - 145/55)  BP(mean): --  RR: 17 (08 Aug 2022 05:09) (17 - 18)  SpO2: 95% (08 Aug 2022 05:09) (95% - 95%)    Parameters below as of 08 Aug 2022 05:09  Patient On (Oxygen Delivery Method): room air        PHYSICAL EXAM:  GENERAL: Not in distress   CHEST/LUNG:  Not using accessory muscles   HEART: s1 and s2 present  ABDOMEN:  ? Tenderness resolved   EXTREMITIES: No pedal  edema  CNS: Awake and Alert      LABS:                        10.9   5.46  )-----------( 134      ( 08 Aug 2022 06:35 )             32.6                12.2   15.73 )-----------( 149      ( 05 Aug 2022 10:13 )             37.6             139  |  105  |  9   ----------------------------<  82  3.3<L>   |  25  |  0.68    Ca    8.8      08 Aug 2022 06:35    TPro  5.8<L>  /  Alb  2.2<L>  /  TBili  1.3<H>  /  DBili  x   /  AST  47<H>  /  ALT  168<H>  /  AlkPhos  211<H>      142  |  110<H>  |  11  ----------------------------<  91  3.6   |  25  |  0.76    Ca    8.8      06 Aug 2022 11:56  Phos  2.7       Mg     2.1     -    TPro  6.8  /  Alb  2.6<L>  /  TBili  4.2<H>  /  DBili  x   /  AST  289<H>  /  ALT  397<H>  /  AlkPhos  262<H>  -        Urinalysis Basic - ( 05 Aug 2022 01:48 )  Color: Yellow / Appearance: Clear / S.015 / pH: x  Gluc: x / Ketone: Negative  / Bili: Negative / Urobili: 4   Blood: x / Protein: 30 mg/dL / Nitrite: Negative   Leuk Esterase: Negative / RBC: 5-10 /HPF / WBC 0-2 /HPF   Sq Epi: x / Non Sq Epi: Few /HPF / Bacteria: Few /HPF        MEDICATIONS  (STANDING):    lidocaine   4% Patch 1 Patch Transdermal daily  montelukast 10 milliGRAM(s) Oral daily  piperacillin/tazobactam IVPB.. 3.375 Gram(s) IV Intermittent every 8 hours  potassium chloride  10 mEq/100 mL IVPB 10 milliEquivalent(s) IV Intermittent every 1 hour        RADIOLOGY & ADDITIONAL TESTS:    22: CT Abdomen and Pelvis w/ IV Cont (22 @ 01:24) Mild intrahepatic and extrahepatic biliary ductal dilatation with common   bile duct measuring up to 1 cm up to the level of the ampulla. Mild  enhancement of the walls of the bile ducts which raises concern for   cholangitis. Previously seen pneumobilia is no longer visualized. Subtle hyperdensity in the mid common bile duct which may represent   sludge/stones. Correlate with LFTs. MRI/MRCP should be considered.    Under distended gallbladder with air in the cystic duct and gallbladder.  Nonspecific pericholecystic fluid.      22: US Hepatic & Pancreatic (22 @ 10:03) The gallbladder is partially contracted.  No evidence of cholelithiasis or acute cholecystitis.        MICROBIOLOGY DATA:    Culture - Blood (22 @ 12:06)   Specimen Source: .Blood Blood-Peripheral   Culture Results: No growth to date.     Culture - Blood (22 @ 11:56)   Specimen Source: .Blood Blood-Peripheral   Culture Results: No growth to date.     COVID-19 PCR . (22 @ 03:13)   COVID-19 PCR: NotDetec:

## 2022-08-08 NOTE — PROGRESS NOTE ADULT - SUBJECTIVE AND OBJECTIVE BOX
[   ] ICU                                          [   ] CCU                                      [ X  ] Medical Floor    Patient is a 86 year old female with abdominal pain. GI consulted to evaluate.          Patient is a 86 y o female with  Past medical history significant for GERD, HTN, Chronic back pain, found to be bulging discs on MRI, incidental finding of unprovoked PE on the MRI, s/p several months of eliquis now DC'd, gal bladder calcification found on that CT angio, declined surgery 2018, was for OP f/u, later found with gall stone pancreatitis with CBD stone in 2019, s/p ERCP and removal of 1.2cm stone March 2019 complicated by pneumobilia, recent ED visit last month for abdominal pain, dc home due to no change in pneumobilia, now comes in due to 2 week hx of persistent,  constant, 10/10, cramping mid epigastric pain that does not radiate, no alleviating factors, but worse with food, a/w nausea, and NBNB emesis x4 in the ED. No chest pain, no shortness of breath, no palpitations, no changes in bowel or bladder function.    Patient is comfortable. No new complaints reported, No abdominal pain, N/V, hematemesis, hematochezia, melena, fever, chills, chest pain, SOB, cough or diarrhea reported.         PAIN MANAGEMENT:  Pain Scale:                 /10  Pain Location:      Prior Colonoscopy:  Unknown    PAST MEDICAL HISTORY  HTN (hypertension)  GERD   Pulmonary emboli  Gallstones        PAST SURGICAL HISTORY  Appendectomy         Allergies    No Known Drug Allergies  Seafood (Hives)    Intolerances  None         MEDICATIONS  (STANDING):  atorvastatin 20 milliGRAM(s) Oral at bedtime  dextrose 5% + sodium chloride 0.9%. 1000 milliLiter(s) (75 mL/Hr) IV Continuous <Continuous>  dextrose 5% + sodium chloride 0.9%. 1000 milliLiter(s) (75 mL/Hr) IV Continuous <Continuous>  montelukast 10 milliGRAM(s) Oral daily  piperacillin/tazobactam IVPB.. 3.375 Gram(s) IV Intermittent every 8 hours  potassium phosphate / sodium phosphate Tablet (K-PHOS No. 2) 1 Tablet(s) Oral two times a day    MEDICATIONS  (PRN):  acetaminophen     Tablet .. 650 milliGRAM(s) Oral every 6 hours PRN Moderate Pain (4 - 6)  ondansetron Injectable 4 milliGRAM(s) IV Push every 8 hours PRN Nausea and/or Vomiting      SOCIAL HISTORY  Advanced Directives:       [  ] Full Code       [ X ] DNR  Marital Status:         [  ] M      [ X ] S      [  ] D       [  ] W  Children:       [X  ] Yes      [  ] No  Occupation:        [  ] Employed       [ X ] Unemployed       [  ] Retired  Diet:       [ X ] Regular       [  ] PEG feeding          [  ] NG tube feeding  Drug Use:           [X  ] Patient denied          [  ] Yes  Alcohol:           [ X ] No             [  ] Yes (socially)         [  ] Yes (chronic)  Tobacco:           [  ] Yes           [ X ] No      FAMILY HISTORY  [ X ] Heart Disease            [ X ] Diabetes             [ X ] HTN             [  ] Colon Cancer             [  ] Stomach Cancer              [  ] Pancreatic Cancer      VITALS  Vital Signs Last 24 Hrs  T(C): 36.8 (08 Aug 2022 05:09), Max: 37.3 (07 Aug 2022 13:27)  T(F): 98.2 (08 Aug 2022 05:09), Max: 99.2 (07 Aug 2022 13:27)  HR: 68 (08 Aug 2022 05:09) (59 - 68)  BP: 136/65 (08 Aug 2022 05:09) (130/58 - 145/55)   RR: 17 (08 Aug 2022 05:09) (17 - 18)  SpO2: 95% (08 Aug 2022 05:09) (93% - 95%)  Parameters below as of 08 Aug 2022 05:09  Patient On (Oxygen Delivery Method): room air       MEDICATIONS  (STANDING):  indomethacin Suppository 100 milliGRAM(s) Rectal once  lidocaine   4% Patch 1 Patch Transdermal daily  montelukast 10 milliGRAM(s) Oral daily  piperacillin/tazobactam IVPB.. 3.375 Gram(s) IV Intermittent every 8 hours  potassium chloride  10 mEq/100 mL IVPB 10 milliEquivalent(s) IV Intermittent every 1 hour    MEDICATIONS  (PRN):  acetaminophen     Tablet .. 650 milliGRAM(s) Oral every 6 hours PRN Moderate Pain (4 - 6)  acetaminophen     Tablet .. 650 milliGRAM(s) Oral every 6 hours PRN Temp greater or equal to 38C (100.4F)  ondansetron Injectable 4 milliGRAM(s) IV Push every 8 hours PRN Nausea and/or Vomiting                            10.9   5.46  )-----------( 134      ( 08 Aug 2022 06:35 )             32.6       08-08    139  |  105  |  9   ----------------------------<  82  3.3<L>   |  25  |  0.68    Ca    8.8      08 Aug 2022 06:35    TPro  5.8<L>  /  Alb  2.2<L>  /  TBili  1.3<H>  /  DBili  x   /  AST  47<H>  /  ALT  168<H>  /  AlkPhos  211<H>  08-08

## 2022-08-08 NOTE — PROGRESS NOTE ADULT - SUBJECTIVE AND OBJECTIVE BOX
Patient is a 86y old  Female who presents with a chief complaint of Abdominal pain (08 Aug 2022 09:11)  Awake, alert, comfortable out of bed in NAD. Currently on RA, Awaiting ERCP    INTERVAL HPI/OVERNIGHT EVENTS:      VITAL SIGNS:  T(F): 98.2 (08-08-22 @ 05:09)  HR: 68 (08-08-22 @ 05:09)  BP: 136/65 (08-08-22 @ 05:09)  RR: 17 (08-08-22 @ 05:09)  SpO2: 95% (08-08-22 @ 05:09)  Wt(kg): --  I&O's Detail          REVIEW OF SYSTEMS:    CONSTITUTIONAL:  No fevers, chills, sweats    HEENT:  Eyes:  No diplopia or blurred vision. ENT:  No earache, sore throat or runny nose.    CARDIOVASCULAR:  No pressure, squeezing, tightness, or heaviness about the chest; no palpitations.    RESPIRATORY:  Per HPI    GASTROINTESTINAL:  No abdominal pain, nausea, vomiting or diarrhea.    GENITOURINARY:  No dysuria, frequency or urgency.    NEUROLOGIC:  No paresthesias, fasciculations, seizures or weakness.    PSYCHIATRIC:  No disorder of thought or mood.      PHYSICAL EXAM:    Constitutional: Well developed and nourished  Eyes:Perrla  ENMT: normal  Neck:supple  Respiratory: good air entry  Cardiovascular: S1 S2 regular  Gastrointestinal: Soft, Non tender  Extremities: No edema  Vascular:normal  Neurological:Awake, alert,Ox3  Musculoskeletal:Normal      MEDICATIONS  (STANDING):  indomethacin Suppository 100 milliGRAM(s) Rectal once  lidocaine   4% Patch 1 Patch Transdermal daily  montelukast 10 milliGRAM(s) Oral daily  piperacillin/tazobactam IVPB.. 3.375 Gram(s) IV Intermittent every 8 hours    MEDICATIONS  (PRN):  acetaminophen     Tablet .. 650 milliGRAM(s) Oral every 6 hours PRN Moderate Pain (4 - 6)  acetaminophen     Tablet .. 650 milliGRAM(s) Oral every 6 hours PRN Temp greater or equal to 38C (100.4F)  ondansetron Injectable 4 milliGRAM(s) IV Push every 8 hours PRN Nausea and/or Vomiting      Allergies    No Known Drug Allergies  Seafood (Hives)    Intolerances        LABS:                        10.9   5.46  )-----------( 134      ( 08 Aug 2022 06:35 )             32.6     08-08    139  |  105  |  9   ----------------------------<  82  3.3<L>   |  25  |  0.68    Ca    8.8      08 Aug 2022 06:35  Phos  2.7     08-06  Mg     2.1     08-06    TPro  5.8<L>  /  Alb  2.2<L>  /  TBili  1.3<H>  /  DBili  x   /  AST  47<H>  /  ALT  168<H>  /  AlkPhos  211<H>  08-08    PT/INR - ( 06 Aug 2022 11:56 )   PT: 17.2 sec;   INR: 1.44 ratio         PTT - ( 06 Aug 2022 11:56 )  PTT:27.2 sec          CAPILLARY BLOOD GLUCOSE        pro-bnp 188 08-04 @ 20:35     d-dimer --  08-04 @ 20:35      RADIOLOGY & ADDITIONAL TESTS:    CXR:    Ct scan chest:    ekg;    echo:

## 2022-08-08 NOTE — PROGRESS NOTE ADULT - SUBJECTIVE AND OBJECTIVE BOX
NP Note discussed with  Primary Attending    Patient is a 86y old  Female who presents with a chief complaint of Abdominal pain (08 Aug 2022 14:54)      INTERVAL HPI/OVERNIGHT EVENTS: no new complaints.     MEDICATIONS  (STANDING):  lactated ringers. 1000 milliLiter(s) (75 mL/Hr) IV Continuous <Continuous>  lidocaine   4% Patch 1 Patch Transdermal daily  montelukast 10 milliGRAM(s) Oral daily  piperacillin/tazobactam IVPB.. 3.375 Gram(s) IV Intermittent every 8 hours  potassium chloride  10 mEq/100 mL IVPB 10 milliEquivalent(s) IV Intermittent every 1 hour    MEDICATIONS  (PRN):  acetaminophen     Tablet .. 650 milliGRAM(s) Oral every 6 hours PRN Moderate Pain (4 - 6)  acetaminophen     Tablet .. 650 milliGRAM(s) Oral every 6 hours PRN Temp greater or equal to 38C (100.4F)  ondansetron Injectable 4 milliGRAM(s) IV Push every 8 hours PRN Nausea and/or Vomiting      __________________________________________________  REVIEW OF SYSTEMS: No complaints.     CONSTITUTIONAL: No fever,   EYES: no acute visual disturbances  NECK: No pain or stiffness  RESPIRATORY: No cough; No shortness of breath  CARDIOVASCULAR: No chest pain, no palpitations  GASTROINTESTINAL: No pain. No nausea or vomiting; No diarrhea   NEUROLOGICAL: No headache or numbness, no tremors  MUSCULOSKELETAL: No joint pain, no muscle pain  GENITOURINARY: no dysuria, no frequency, no hesitancy  PSYCHIATRY: no depression , no anxiety  ALL OTHER  ROS negative        Vital Signs Last 24 Hrs  T(C): 36.6 (08 Aug 2022 15:29), Max: 36.9 (07 Aug 2022 20:11)  T(F): 97.9 (08 Aug 2022 15:29), Max: 98.4 (07 Aug 2022 20:11)  HR: 82 (08 Aug 2022 16:24) (59 - 89)  BP: 145/81 (08 Aug 2022 16:24) (123/59 - 145/81)  BP(mean): --  RR: 18 (08 Aug 2022 16:24) (16 - 20)  SpO2: 98% (08 Aug 2022 16:24) (95% - 98%)    Parameters below as of 08 Aug 2022 05:09  Patient On (Oxygen Delivery Method): room air    ________________________________________________  PHYSICAL EXAM:  GENERAL: NAD.   HEENT: Normocephalic;  conjunctivae and sclerae clear; moist mucous membranes;   NECK : supple  CHEST/LUNG: Clear to auscultation bilaterally with good air entry   HEART: S1 S2  regular; no murmurs, gallops or rubs  ABDOMEN: Soft, Nontender, Nondistended; Bowel sounds present  EXTREMITIES: no cyanosis; no edema; no calf tenderness  SKIN: warm and dry; no rash  NERVOUS SYSTEM:  Awake and alert; Oriented  to place, person and time ; no new deficits    _________________________________________________  LABS:                        10.9   5.46  )-----------( 134      ( 08 Aug 2022 06:35 )             32.6     08-08    139  |  105  |  9   ----------------------------<  82  3.3<L>   |  25  |  0.68    Ca    8.8      08 Aug 2022 06:35    TPro  5.8<L>  /  Alb  2.2<L>  /  TBili  1.3<H>  /  DBili  x   /  AST  47<H>  /  ALT  168<H>  /  AlkPhos  211<H>  08-08    CAPILLARY BLOOD GLUCOSE    RADIOLOGY & ADDITIONAL TESTS:    Imaging Personally Reviewed:  YES    Consultant(s) Notes Reviewed:   YES    Care Discussed with Consultants : GI     Plan of care was discussed with patient and family; all questions and concerns were addressed and care was aligned with patient's wishes.

## 2022-08-08 NOTE — PROGRESS NOTE ADULT - ASSESSMENT
1. Abdominal pain  2. Gall stone  3. Cholangitis  4. Dilated biliary ducts    Suggestions:    1. Follow up LFT's  2. Antibiotics IV  3. ID follow up  4. ERCP by Dr Shore  5. Protonix daily  6. DVT prophylaxis

## 2022-08-09 ENCOUNTER — TRANSCRIPTION ENCOUNTER (OUTPATIENT)
Age: 87
End: 2022-08-09

## 2022-08-09 LAB
ALBUMIN SERPL ELPH-MCNC: 2.2 G/DL — LOW (ref 3.5–5)
ALP SERPL-CCNC: 191 U/L — HIGH (ref 40–120)
ALT FLD-CCNC: 124 U/L DA — HIGH (ref 10–60)
APTT BLD: 25.9 SEC — LOW (ref 27.5–35.5)
AST SERPL-CCNC: 35 U/L — SIGNIFICANT CHANGE UP (ref 10–40)
BILIRUB DIRECT SERPL-MCNC: 0.5 MG/DL — HIGH (ref 0–0.3)
BILIRUB INDIRECT FLD-MCNC: 0.6 MG/DL — SIGNIFICANT CHANGE UP (ref 0.2–1)
BILIRUB SERPL-MCNC: 1.1 MG/DL — SIGNIFICANT CHANGE UP (ref 0.2–1.2)
HCT VFR BLD CALC: 31.4 % — LOW (ref 34.5–45)
HGB BLD-MCNC: 10.5 G/DL — LOW (ref 11.5–15.5)
INR BLD: 1.19 RATIO — HIGH (ref 0.88–1.16)
MCHC RBC-ENTMCNC: 28.5 PG — SIGNIFICANT CHANGE UP (ref 27–34)
MCHC RBC-ENTMCNC: 33.4 GM/DL — SIGNIFICANT CHANGE UP (ref 32–36)
MCV RBC AUTO: 85.3 FL — SIGNIFICANT CHANGE UP (ref 80–100)
NRBC # BLD: 0 /100 WBCS — SIGNIFICANT CHANGE UP (ref 0–0)
PLATELET # BLD AUTO: 141 K/UL — LOW (ref 150–400)
PROT SERPL-MCNC: 5.7 G/DL — LOW (ref 6–8.3)
PROTHROM AB SERPL-ACNC: 14.2 SEC — HIGH (ref 10.5–13.4)
RBC # BLD: 3.68 M/UL — LOW (ref 3.8–5.2)
RBC # FLD: 13.4 % — SIGNIFICANT CHANGE UP (ref 10.3–14.5)
SARS-COV-2 RNA SPEC QL NAA+PROBE: SIGNIFICANT CHANGE UP
WBC # BLD: 5.16 K/UL — SIGNIFICANT CHANGE UP (ref 3.8–10.5)
WBC # FLD AUTO: 5.16 K/UL — SIGNIFICANT CHANGE UP (ref 3.8–10.5)

## 2022-08-09 PROCEDURE — 99232 SBSQ HOSP IP/OBS MODERATE 35: CPT

## 2022-08-09 RX ORDER — PANTOPRAZOLE SODIUM 20 MG/1
40 TABLET, DELAYED RELEASE ORAL
Refills: 0 | Status: DISCONTINUED | OUTPATIENT
Start: 2022-08-09 | End: 2022-08-10

## 2022-08-09 RX ORDER — CHLORHEXIDINE GLUCONATE 213 G/1000ML
1 SOLUTION TOPICAL DAILY
Refills: 0 | Status: DISCONTINUED | OUTPATIENT
Start: 2022-08-10 | End: 2022-08-10

## 2022-08-09 RX ADMIN — PIPERACILLIN AND TAZOBACTAM 25 GRAM(S): 4; .5 INJECTION, POWDER, LYOPHILIZED, FOR SOLUTION INTRAVENOUS at 05:30

## 2022-08-09 RX ADMIN — BENZOCAINE AND MENTHOL 1 LOZENGE: 5; 1 LIQUID ORAL at 05:31

## 2022-08-09 RX ADMIN — PIPERACILLIN AND TAZOBACTAM 25 GRAM(S): 4; .5 INJECTION, POWDER, LYOPHILIZED, FOR SOLUTION INTRAVENOUS at 13:53

## 2022-08-09 RX ADMIN — SODIUM CHLORIDE 75 MILLILITER(S): 9 INJECTION, SOLUTION INTRAVENOUS at 12:12

## 2022-08-09 RX ADMIN — MONTELUKAST 10 MILLIGRAM(S): 4 TABLET, CHEWABLE ORAL at 12:11

## 2022-08-09 RX ADMIN — LIDOCAINE 1 PATCH: 4 CREAM TOPICAL at 18:49

## 2022-08-09 RX ADMIN — PIPERACILLIN AND TAZOBACTAM 25 GRAM(S): 4; .5 INJECTION, POWDER, LYOPHILIZED, FOR SOLUTION INTRAVENOUS at 21:14

## 2022-08-09 RX ADMIN — LIDOCAINE 1 PATCH: 4 CREAM TOPICAL at 12:11

## 2022-08-09 RX ADMIN — LIDOCAINE 1 PATCH: 4 CREAM TOPICAL at 00:30

## 2022-08-09 NOTE — PROGRESS NOTE ADULT - ASSESSMENT
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  Pt admitted for suspected cholangitis, started on IVF, bowel rest and abx. GI Dr. Garza on board. S/p ERCP on 8/8 which showed choledocholithiasis, pt recommended for lap luis. Surgery following.

## 2022-08-09 NOTE — PROGRESS NOTE ADULT - ASSESSMENT
1. Abdominal pain  2. Gall stone  3. Cholangitis  4. Dilated biliary ducts  5. S/p ERCP    Suggestions:    1. Follow up LFT's  2. Antibiotics IV  3. ID follow up  4. Cholecystectomy per surgery  5. Protonix daily  6. DVT prophylaxis

## 2022-08-09 NOTE — PROGRESS NOTE ADULT - SUBJECTIVE AND OBJECTIVE BOX
Patient is a 86y old  Female who presents with a chief complaint of Abdominal pain (09 Aug 2022 10:18)    Patient is awake, alert, comfortable lying on the bed, in no acute distress on RA. Patient had the ERCP done yesterday and laparoscopic cholecystectomy is planned.     INTERVAL HPI/OVERNIGHT EVENTS:      VITAL SIGNS:  T(F): 99.6 (08-09-22 @ 05:25)  HR: 55 (08-09-22 @ 05:25)  BP: 147/64 (08-09-22 @ 05:25)  RR: 18 (08-09-22 @ 05:25)  SpO2: 96% (08-09-22 @ 05:25)  Wt(kg): --  I&O's Detail          REVIEW OF SYSTEMS:    CONSTITUTIONAL:  No fevers, chills, sweats    HEENT:  Eyes:  No diplopia or blurred vision. ENT:  No earache, sore throat or runny nose.    CARDIOVASCULAR:  No pressure, squeezing, tightness, or heaviness about the chest; no palpitations.    RESPIRATORY:  Per HPI    GASTROINTESTINAL:  No abdominal pain, nausea, vomiting or diarrhea.    GENITOURINARY:  No dysuria, frequency or urgency.    NEUROLOGIC:  No paresthesias, fasciculations, seizures or weakness.    PSYCHIATRIC:  No disorder of thought or mood.      PHYSICAL EXAM:    Constitutional: Well developed and nourished  Eyes:Perrla  ENMT: normal  Neck:supple  Respiratory: good air entry  Cardiovascular: S1 S2 regular  Gastrointestinal: Soft, Non tender  Extremities: No edema  Vascular:normal  Neurological:Awake, alert,Ox3  Musculoskeletal:Normal      MEDICATIONS  (STANDING):  lactated ringers. 1000 milliLiter(s) (75 mL/Hr) IV Continuous <Continuous>  lidocaine   4% Patch 1 Patch Transdermal daily  montelukast 10 milliGRAM(s) Oral daily  pantoprazole    Tablet 40 milliGRAM(s) Oral before breakfast  piperacillin/tazobactam IVPB.. 3.375 Gram(s) IV Intermittent every 8 hours    MEDICATIONS  (PRN):  acetaminophen     Tablet .. 650 milliGRAM(s) Oral every 6 hours PRN Moderate Pain (4 - 6)  acetaminophen     Tablet .. 650 milliGRAM(s) Oral every 6 hours PRN Temp greater or equal to 38C (100.4F)  benzocaine 15 mG/menthol 3.6 mG Lozenge 1 Lozenge Oral three times a day PRN Sore Throat  ondansetron Injectable 4 milliGRAM(s) IV Push every 8 hours PRN Nausea and/or Vomiting      Allergies    No Known Drug Allergies  Seafood (Hives)    Intolerances        LABS:                        10.5   5.16  )-----------( 141      ( 09 Aug 2022 07:27 )             31.4     08-08    139  |  105  |  9   ----------------------------<  82  3.3<L>   |  25  |  0.68    Ca    8.8      08 Aug 2022 06:35    TPro  5.7<L>  /  Alb  2.2<L>  /  TBili  1.1  /  DBili  0.5<H>  /  AST  35  /  ALT  124<H>  /  AlkPhos  191<H>  08-09    PT/INR - ( 09 Aug 2022 07:27 )   PT: 14.2 sec;   INR: 1.19 ratio         PTT - ( 09 Aug 2022 07:27 )  PTT:25.9 sec          CAPILLARY BLOOD GLUCOSE        pro-bnp 188 08-04 @ 20:35     d-dimer --  08-04 @ 20:35      RADIOLOGY & ADDITIONAL TESTS:    < from: ERCP (08.08.22 @ 13:45) >  Choledocholithiasis. Prior sphincterotomy appeared to have been done although    not complete . .        Plan:        Continue Abx. Needs Lap luis on THIS admission. Clears. Follow LFT's    < end of copied text >    Ct scan chest:    ekg;    echo:

## 2022-08-09 NOTE — PROGRESS NOTE ADULT - ASSESSMENT
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 On admission, she found to have low grade fever, Leukocytosis and elevated Transaminitis. The Ct abd/pelvis shows Mild intrahepatic and extrahepatic biliary ductal dilatation with common bile duct measuring up to 1 cm up to the level of the ampulla. Mild  enhancement of the walls of the bile ducts which raises concern for  cholangitis. Previously seen pneumobilia is no longer visualized. She has started on Zosyn and the ID consult requested to assist with further evaluation and antibiotic management.     # Sepsis ( Low grade fever + Leukocytosis)  # Cholangitis - on CT abd/pelvis  # Transaminitis- improving, ? transient  obstruction     would recommend:    1.   3. Trend LFts , trending down   4. Continue Zosyn until work up is done    Attending Attestation:    Spent more than 35 minutes on total encounter, more than 50 % of the visit was spent counseling and/or coordinating care by the Attending physician.  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 On admission, she found to have low grade fever, Leukocytosis and elevated Transaminitis. The Ct abd/pelvis shows Mild intrahepatic and extrahepatic biliary ductal dilatation with common bile duct measuring up to 1 cm up to the level of the ampulla. Mild  enhancement of the walls of the bile ducts which raises concern for  cholangitis. Previously seen pneumobilia is no longer visualized. She has started on Zosyn and the ID consult requested to assist with further evaluation and antibiotic management.     # Sepsis ( Low grade fever + Leukocytosis)  # Cholangitis - on CT abd/pelvis-  s/p ERCP - 8/8/22- found Choledocholithiasis   # Transaminitis- improving, ? transient  obstruction     would recommend:    1. NPO after midnight for cholecystectomy tomorrow  2. Trend LFts , trending down   3. Continue Zosyn until work up is done  4. OOB to chair     d/w Covering Np and family at the bed side     Attending Attestation:    Spent more than 35 minutes on total encounter, more than 50 % of the visit was spent counseling and/or coordinating care by the Attending physician.  Complexity:  - Sepsis  - Cholangitis  - Gall bladder stone

## 2022-08-09 NOTE — PROGRESS NOTE ADULT - ASSESSMENT
86y.o. Female s/p ERCP 8/8 for cholangitis with previous h/o gallstone pancreatitis with CBD stone s/p ERCP with sphincterotomy, no stent  afebrile, wbc wnl   tbili wnl, mild transaminitis   consent obtained     - plan for laparoscopic cholecystectomy, with IOC, possible open 8/10  - NPO past midnight, IVF when NPO   - preop labs  - please document medical clearance   - remainder of care as per primary team   - discussed and agreed with Dr. Trent

## 2022-08-09 NOTE — PROGRESS NOTE ADULT - SUBJECTIVE AND OBJECTIVE BOX
Patient is a 86y old  Female who presents with a chief complaint of Abdominal pain (09 Aug 2022 09:08)  Pt seen and examined with daughter Lyn at bedside,  was offered but pt preferred daughter to interpret     OVERNIGHT EVENTS: no acute events overnight, offering no new complaints, sitting up in bed       REVIEW OF SYSTEMS:  CONSTITUTIONAL: No fever, chills  NECK: No pain or stiffness  RESPIRATORY: No cough, SOB  CARDIOVASCULAR: No chest pain, palpitations  GASTROINTESTINAL: No abdominal pain. No nausea, vomiting, or diarrhea  GENITOURINARY: No dysuria  NEUROLOGICAL: No HA  MUSCULOSKELETAL: No joint pain or swelling; No muscle, back, or extremity pain      T(C): 37.6 (08-09-22 @ 05:25), Max: 37.6 (08-09-22 @ 05:25)  HR: 55 (08-09-22 @ 05:25) (55 - 89)  BP: 147/64 (08-09-22 @ 05:25) (118/74 - 147/64)  RR: 18 (08-09-22 @ 05:25) (16 - 20)  SpO2: 96% (08-09-22 @ 05:25) (95% - 98%)  Wt(kg): --Vital Signs Last 24 Hrs  T(C): 37.6 (09 Aug 2022 05:25), Max: 37.6 (09 Aug 2022 05:25)  T(F): 99.6 (09 Aug 2022 05:25), Max: 99.6 (09 Aug 2022 05:25)  HR: 55 (09 Aug 2022 05:25) (55 - 89)  BP: 147/64 (09 Aug 2022 05:25) (118/74 - 147/64)  BP(mean): --  RR: 18 (09 Aug 2022 05:25) (16 - 20)  SpO2: 96% (09 Aug 2022 05:25) (95% - 98%)    Parameters below as of 09 Aug 2022 05:25  Patient On (Oxygen Delivery Method): room air    MEDICATIONS  (STANDING):  lactated ringers. 1000 milliLiter(s) (75 mL/Hr) IV Continuous <Continuous>  lidocaine   4% Patch 1 Patch Transdermal daily  montelukast 10 milliGRAM(s) Oral daily  piperacillin/tazobactam IVPB.. 3.375 Gram(s) IV Intermittent every 8 hours    MEDICATIONS  (PRN):  acetaminophen     Tablet .. 650 milliGRAM(s) Oral every 6 hours PRN Moderate Pain (4 - 6)  acetaminophen     Tablet .. 650 milliGRAM(s) Oral every 6 hours PRN Temp greater or equal to 38C (100.4F)  benzocaine 15 mG/menthol 3.6 mG Lozenge 1 Lozenge Oral three times a day PRN Sore Throat  ondansetron Injectable 4 milliGRAM(s) IV Push every 8 hours PRN Nausea and/or Vomiting        PHYSICAL EXAM:  GENERAL: NAD  EYES: clear conjunctiva  ENMT: Moist mucous membranes  NECK: Supple, No JVD  CHEST/LUNG: Clear to auscultation bilaterally; No rales, rhonchi, wheezing, or rubs  HEART: S1, S2, Regular rate and rhythm  ABDOMEN: Soft, Nontender, Nondistended; Bowel sounds present  NEURO: Alert & Oriented X3  EXTREMITIES: No LE edema, no calf tenderness  SKIN: No rashes or lesions    Consultant(s) Notes Reviewed:  [x ] YES  [ ] NO  Care Discussed with Consultants/Other Providers [ x] YES  [ ] NO    LABS:                        10.5   5.16  )-----------( 141      ( 09 Aug 2022 07:27 )             31.4     08-08    139  |  105  |  9   ----------------------------<  82  3.3<L>   |  25  |  0.68    Ca    8.8      08 Aug 2022 06:35    TPro  5.7<L>  /  Alb  2.2<L>  /  TBili  1.1  /  DBili  0.5<H>  /  AST  35  /  ALT  124<H>  /  AlkPhos  191<H>  08-09    PT/INR - ( 09 Aug 2022 07:27 )   PT: 14.2 sec;   INR: 1.19 ratio      PTT - ( 09 Aug 2022 07:27 )  PTT:25.9 sec  CAPILLARY BLOOD GLUCOSE    RADIOLOGY & ADDITIONAL TESTS:    < from: MR MRCP w/wo IV Cont (08.05.22 @ 17:26) >    ACC: [id number] EXAM:  MR MRCP WAW IC                          PROCEDURE DATE:  08/05/2022          INTERPRETATION:  CLINICAL INFORMATION: Question of cholangitis and   biliary stones on recent CT.    COMPARISON: 8/5/2022.    CONTRAST/COMPLICATIONS:  IV Contrast: Gadavist  7 cc administered   0.5 cc discarded  Oral Contrast: NONE  Complications: None reported at time of study completion    PROCEDURE:  MRI of the abdomen was performed.  MRCP was performed.    FINDINGS:  LOWER CHEST: Bibasilar subsegmental linear atelectasis. Mild   cardiomegaly. Small hiatal hernia.    LIVER: Within normal limits.  BILE DUCTS: Mild biliary ductal dilatation with CBD measuring up to 11 mm   with multiple stones in mid to distal lumen. Mild enhancement of the wall   of the extrahepatic bile duct.  GALLBLADDER: Contracted and contains a focus of air.  SPLEEN: Scattered cysts.  PANCREAS: Innumerable tiny side branch IPMNs.  ADRENALS: Within normal limits.  KIDNEYS/URETERS: Left renal cyst.    VISUALIZED PORTIONS:  BOWEL: Within normal limits.  PERITONEUM: No ascites.  VESSELS: Separate origin of common hepatic artery from the aorta.  RETROPERITONEUM/LYMPH NODES: No lymphadenopathy.  ABDOMINAL WALL: Within normal limits.  BONES: Within normal limits.    IMPRESSION:  Choledocholithiasis with possible cholangitis.

## 2022-08-09 NOTE — PROGRESS NOTE ADULT - SUBJECTIVE AND OBJECTIVE BOX
Patient is seen and examined at the bed side, is afebrile. The blood cultures from 22 have no growth to date. She is scheduled for ERCP today.       REVIEW OF SYSTEMS: All other review systems are negative      ALLERGIES: No Known Drug Allergies  Seafood (Hives)      Vital Signs Last 24 Hrs  T(C): 37.6 (09 Aug 2022 05:25), Max: 37.6 (09 Aug 2022 05:25)  T(F): 99.6 (09 Aug 2022 05:25), Max: 99.6 (09 Aug 2022 05:25)  HR: 55 (09 Aug 2022 05:25) (55 - 89)  BP: 147/64 (09 Aug 2022 05:25) (118/74 - 147/64)  BP(mean): --  RR: 18 (09 Aug 2022 05:25) (16 - 20)  SpO2: 96% (09 Aug 2022 05:25) (95% - 98%)    Parameters below as of 09 Aug 2022 05:25  Patient On (Oxygen Delivery Method): room air        PHYSICAL EXAM:  GENERAL: Not in distress   CHEST/LUNG:  Not using accessory muscles   HEART: s1 and s2 present  ABDOMEN:  ? Tenderness resolved   EXTREMITIES: No pedal  edema  CNS: Awake and Alert      LABS:                        10.5   5.16  )-----------( 141      ( 09 Aug 2022 07:27 )             31.4                        10.9   5.46  )-----------( 134      ( 08 Aug 2022 06:35 )             32.6                12.2   15.73 )-----------( 149      ( 05 Aug 2022 10:13 )             37.6           139  |  105  |  9   ----------------------------<  82  3.3<L>   |  25  |  0.68    Ca    8.8      08 Aug 2022 06:35    TPro  5.7<L>  /  Alb  2.2<L>  /  TBili  1.1  /  DBili  0.5<H>  /  AST  35  /  ALT  124<H>  /  AlkPhos  191<H>  -08    139  |  105  |  9   ----------------------------<  82  3.3<L>   |  25  |  0.68    Ca    8.8      08 Aug 2022 06:35    TPro  5.8<L>  /  Alb  2.2<L>  /  TBili  1.3<H>  /  DBili  x   /  AST  47<H>  /  ALT  168<H>  /  AlkPhos  211<H>  08-        Urinalysis Basic - ( 05 Aug 2022 01:48 )  Color: Yellow / Appearance: Clear / S.015 / pH: x  Gluc: x / Ketone: Negative  / Bili: Negative / Urobili: 4   Blood: x / Protein: 30 mg/dL / Nitrite: Negative   Leuk Esterase: Negative / RBC: 5-10 /HPF / WBC 0-2 /HPF   Sq Epi: x / Non Sq Epi: Few /HPF / Bacteria: Few /HPF        MEDICATIONS  (STANDING):    lactated ringers. 1000 milliLiter(s) (75 mL/Hr) IV Continuous <Continuous>  lidocaine   4% Patch 1 Patch Transdermal daily  montelukast 10 milliGRAM(s) Oral daily  pantoprazole    Tablet 40 milliGRAM(s) Oral before breakfast  piperacillin/tazobactam IVPB.. 3.375 Gram(s) IV Intermittent every 8 hours      RADIOLOGY & ADDITIONAL TESTS:    22: CT Abdomen and Pelvis w/ IV Cont (22 @ 01:24) Mild intrahepatic and extrahepatic biliary ductal dilatation with common   bile duct measuring up to 1 cm up to the level of the ampulla. Mild  enhancement of the walls of the bile ducts which raises concern for   cholangitis. Previously seen pneumobilia is no longer visualized. Subtle hyperdensity in the mid common bile duct which may represent   sludge/stones. Correlate with LFTs. MRI/MRCP should be considered.    Under distended gallbladder with air in the cystic duct and gallbladder.  Nonspecific pericholecystic fluid.      22: US Hepatic & Pancreatic (22 @ 10:03) The gallbladder is partially contracted.  No evidence of cholelithiasis or acute cholecystitis.        MICROBIOLOGY DATA:    Culture - Blood (22 @ 12:06)   Specimen Source: .Blood Blood-Peripheral   Culture Results: No growth to date.     Culture - Blood (22 @ 11:56)   Specimen Source: .Blood Blood-Peripheral   Culture Results: No growth to date.     COVID-19 PCR . (22 @ 03:13)   COVID-19 PCR: NotDetec:           Patient is seen and examined at the bed side, is afebrile.  She is s/p ERCP yesterday, 22, tolerated the procedure well.       REVIEW OF SYSTEMS: All other review systems are negative      ALLERGIES: No Known Drug Allergies  Seafood (Hives)      Vital Signs Last 24 Hrs  T(C): 37.6 (09 Aug 2022 05:25), Max: 37.6 (09 Aug 2022 05:25)  T(F): 99.6 (09 Aug 2022 05:25), Max: 99.6 (09 Aug 2022 05:25)  HR: 55 (09 Aug 2022 05:25) (55 - 89)  BP: 147/64 (09 Aug 2022 05:25) (118/74 - 147/64)  BP(mean): --  RR: 18 (09 Aug 2022 05:25) (16 - 20)  SpO2: 96% (09 Aug 2022 05:25) (95% - 98%)    Parameters below as of 09 Aug 2022 05:25  Patient On (Oxygen Delivery Method): room air        PHYSICAL EXAM:  GENERAL: Not in distress   CHEST/LUNG:  Not using accessory muscles   HEART: s1 and s2 present  ABDOMEN:  ? Tenderness resolved   EXTREMITIES: No pedal  edema  CNS: Awake and Alert      LABS:                        10.5   5.16  )-----------( 141      ( 09 Aug 2022 07:27 )             31.4                        12.2   15.73 )-----------( 149      ( 05 Aug 2022 10:13 )             37.6       08-08    139  |  105  |  9   ----------------------------<  82  3.3<L>   |  25  |  0.68    Ca    8.8      08 Aug 2022 06:35    TPro  5.7<L>  /  Alb  2.2<L>  /  TBili  1.1  /  DBili  0.5<H>  /  AST  35  /  ALT  124<H>  /  AlkPhos  191<H>  08-      Urinalysis Basic - ( 05 Aug 2022 01:48 )  Color: Yellow / Appearance: Clear / S.015 / pH: x  Gluc: x / Ketone: Negative  / Bili: Negative / Urobili: 4   Blood: x / Protein: 30 mg/dL / Nitrite: Negative   Leuk Esterase: Negative / RBC: 5-10 /HPF / WBC 0-2 /HPF   Sq Epi: x / Non Sq Epi: Few /HPF / Bacteria: Few /HPF      MEDICATIONS  (STANDING):    lactated ringers. 1000 milliLiter(s) (75 mL/Hr) IV Continuous <Continuous>  lidocaine   4% Patch 1 Patch Transdermal daily  montelukast 10 milliGRAM(s) Oral daily  pantoprazole    Tablet 40 milliGRAM(s) Oral before breakfast  piperacillin/tazobactam IVPB.. 3.375 Gram(s) IV Intermittent every 8 hours      RADIOLOGY & ADDITIONAL TESTS:    22: CT Abdomen and Pelvis w/ IV Cont (22 @ 01:24) Mild intrahepatic and extrahepatic biliary ductal dilatation with common   bile duct measuring up to 1 cm up to the level of the ampulla. Mild  enhancement of the walls of the bile ducts which raises concern for   cholangitis. Previously seen pneumobilia is no longer visualized. Subtle hyperdensity in the mid common bile duct which may represent   sludge/stones. Correlate with LFTs. MRI/MRCP should be considered.    Under distended gallbladder with air in the cystic duct and gallbladder.  Nonspecific pericholecystic fluid.      22: US Hepatic & Pancreatic (22 @ 10:03) The gallbladder is partially contracted.  No evidence of cholelithiasis or acute cholecystitis.        MICROBIOLOGY DATA:    Culture - Blood (22 @ 12:06)   Specimen Source: .Blood Blood-Peripheral   Culture Results: No growth to date.     Culture - Blood (22 @ 11:56)   Specimen Source: .Blood Blood-Peripheral   Culture Results: No growth to date.     COVID-19 PCR . (22 @ 03:13)   COVID-19 PCR: NotDetec:

## 2022-08-09 NOTE — PROGRESS NOTE ADULT - SUBJECTIVE AND OBJECTIVE BOX
Condition discussed with patient and daughter, options risks and benefits explained.  Questions answered.  Laparoscopic cholecystectomy, possible open in AM.  Consent signed.

## 2022-08-09 NOTE — PROGRESS NOTE ADULT - ASSESSMENT
86 y o female with  Hx of GERD, HTN, Chronic back pain, found to be bulging discs on MRI, incidental finding of unprovoked PE on the MRI, s/p several months of eliquis now DC'd, gal bladder calcification found on that CT angio in 2018. Admitted wtih 2 week hx of persistent,  constant, 10/10, cramping mid epigastric pain . Advance  GI consulted for CBD dilation and obstruction.

## 2022-08-09 NOTE — PROGRESS NOTE ADULT - SUBJECTIVE AND OBJECTIVE BOX
[   ] ICU                                          [   ] CCU                                      [x  ] Medical Floor    Patient is a 86 year old female with abdominal pain. GI consulted to evaluate.          Patient is a 86 y o female with  Past medical history significant for GERD, HTN, Chronic back pain, found to be bulging discs on MRI, incidental finding of unprovoked PE on the MRI, s/p several months of eliquis now DC'd, gal bladder calcification found on that CT angio, declined surgery 2018, was for OP f/u, later found with gall stone pancreatitis with CBD stone in 2019, s/p ERCP and removal of 1.2cm stone March 2019 complicated by pneumobilia, recent ED visit last month for abdominal pain, dc home due to no change in pneumobilia, now comes in due to 2 week hx of persistent,  constant, 10/10, cramping mid epigastric pain that does not radiate, no alleviating factors, but worse with food, a/w nausea, and NBNB emesis x4 in the ED. No chest pain, no shortness of breath, no palpitations, no changes in bowel or bladder function.    Patient is comfortable. No new complaints reported, No abdominal pain, N/V, hematemesis, hematochezia, melena, fever, chills, chest pain, SOB, cough or diarrhea reported.         PAIN MANAGEMENT:  Pain Scale:                 /10  Pain Location:      Prior Colonoscopy:  Unknown    PAST MEDICAL HISTORY  HTN (hypertension)  GERD   Pulmonary emboli  Gallstones        PAST SURGICAL HISTORY  Appendectomy         Allergies    No Known Drug Allergies  Seafood (Hives)    Intolerances  None         MEDICATIONS  (STANDING):  atorvastatin 20 milliGRAM(s) Oral at bedtime  dextrose 5% + sodium chloride 0.9%. 1000 milliLiter(s) (75 mL/Hr) IV Continuous <Continuous>  dextrose 5% + sodium chloride 0.9%. 1000 milliLiter(s) (75 mL/Hr) IV Continuous <Continuous>  montelukast 10 milliGRAM(s) Oral daily  piperacillin/tazobactam IVPB.. 3.375 Gram(s) IV Intermittent every 8 hours  potassium phosphate / sodium phosphate Tablet (K-PHOS No. 2) 1 Tablet(s) Oral two times a day    MEDICATIONS  (PRN):  acetaminophen     Tablet .. 650 milliGRAM(s) Oral every 6 hours PRN Moderate Pain (4 - 6)  ondansetron Injectable 4 milliGRAM(s) IV Push every 8 hours PRN Nausea and/or Vomiting      SOCIAL HISTORY  Advanced Directives:       [  ] Full Code       [ X ] DNR  Marital Status:         [  ] M      [ X ] S      [  ] D       [  ] W  Children:       [X  ] Yes      [  ] No  Occupation:        [  ] Employed       [ X ] Unemployed       [  ] Retired  Diet:       [ X ] Regular       [  ] PEG feeding          [  ] NG tube feeding  Drug Use:           [X  ] Patient denied          [  ] Yes  Alcohol:           [ X ] No             [  ] Yes (socially)         [  ] Yes (chronic)  Tobacco:           [  ] Yes           [ X ] No      FAMILY HISTORY  [ X ] Heart Disease            [ X ] Diabetes             [ X ] HTN             [  ] Colon Cancer             [  ] Stomach Cancer              [  ] Pancreatic Cancer      VITALS  Vital Signs Last 24 Hrs  T(C): 37.6 (09 Aug 2022 05:25), Max: 37.6 (09 Aug 2022 05:25)  T(F): 99.6 (09 Aug 2022 05:25), Max: 99.6 (09 Aug 2022 05:25)  HR: 55 (09 Aug 2022 05:25) (55 - 89)  BP: 147/64 (09 Aug 2022 05:25) (118/74 - 147/64)  BP(mean): --  RR: 18 (09 Aug 2022 05:25) (16 - 20)  SpO2: 96% (09 Aug 2022 05:25) (95% - 98%)    Parameters below as of 09 Aug 2022 05:25  Patient On (Oxygen Delivery Method): room air      BP:  P:  Temp:  [   ] On Ventelator    MEDICATIONS  (STANDING):  lactated ringers. 1000 milliLiter(s) (75 mL/Hr) IV Continuous <Continuous>  lidocaine   4% Patch 1 Patch Transdermal daily  montelukast 10 milliGRAM(s) Oral daily  pantoprazole    Tablet 40 milliGRAM(s) Oral before breakfast  piperacillin/tazobactam IVPB.. 3.375 Gram(s) IV Intermittent every 8 hours    MEDICATIONS  (PRN):  acetaminophen     Tablet .. 650 milliGRAM(s) Oral every 6 hours PRN Moderate Pain (4 - 6)  acetaminophen     Tablet .. 650 milliGRAM(s) Oral every 6 hours PRN Temp greater or equal to 38C (100.4F)  benzocaine 15 mG/menthol 3.6 mG Lozenge 1 Lozenge Oral three times a day PRN Sore Throat  ondansetron Injectable 4 milliGRAM(s) IV Push every 8 hours PRN Nausea and/or Vomiting                            10.5   5.16  )-----------( 141      ( 09 Aug 2022 07:27 )             31.4       08-08    139  |  105  |  9   ----------------------------<  82  3.3<L>   |  25  |  0.68    Ca    8.8      08 Aug 2022 06:35    TPro  5.7<L>  /  Alb  2.2<L>  /  TBili  1.1  /  DBili  0.5<H>  /  AST  35  /  ALT  124<H>  /  AlkPhos  191<H>  08-09      PT/INR - ( 09 Aug 2022 07:27 )   PT: 14.2 sec;   INR: 1.19 ratio         PTT - ( 09 Aug 2022 07:27 )  PTT:25.9 sec

## 2022-08-09 NOTE — PROGRESS NOTE ADULT - SUBJECTIVE AND OBJECTIVE BOX
[facility] Progress Note    Patient is a 86y old  Female who presents with a chief complaint of Abdominal pain (09 Aug 2022 13:47)       GI INTERVAL HPI/OVERNIGHT EVENTS: no new complaints  Pt seen and examined this morning. Pt is [place] speaking and prefers daughter Lyn at bedside to translate.  Pt awake/alert, sitting up , endorses feeling better today, tolerating po.     MEDICATIONS  (STANDING):  lactated ringers. 1000 milliLiter(s) (75 mL/Hr) IV Continuous <Continuous>  lidocaine   4% Patch 1 Patch Transdermal daily  montelukast 10 milliGRAM(s) Oral daily  pantoprazole    Tablet 40 milliGRAM(s) Oral before breakfast  piperacillin/tazobactam IVPB.. 3.375 Gram(s) IV Intermittent every 8 hours    MEDICATIONS  (PRN):  acetaminophen     Tablet .. 650 milliGRAM(s) Oral every 6 hours PRN Moderate Pain (4 - 6)  acetaminophen     Tablet .. 650 milliGRAM(s) Oral every 6 hours PRN Temp greater or equal to 38C (100.4F)  benzocaine 15 mG/menthol 3.6 mG Lozenge 1 Lozenge Oral three times a day PRN Sore Throat  ondansetron Injectable 4 milliGRAM(s) IV Push every 8 hours PRN Nausea and/or Vomiting      __________________________________________________  REVIEW OF SYSTEMS:    CONSTITUTIONAL:  No  fever, chills, weakness or fatigue.  HEENT:  Eyes:  No visual loss, blurred vision, double vision or yellow sclerae. Ears, Nose, Throat:  No hearing loss, sneezing, congestion, runny nose or sore throat.  SKIN:  No rash or itching.  CARDIOVASCULAR:  No chest pain, chest pressure or chest discomfort. No palpitations or edema.  RESPIRATORY: No SOB. . No  cough or sputum.  GASTROINTESTINAL:  SEE HPI  GENITOURINARY:  No dysuria, hematuria, urinary frequency  NEUROLOGICAL:  No headache, dizziness, syncope, paralysis, ataxia, numbness or tingling in the extremities. No change in bowel or bladder control.  MUSCULOSKELETAL:  No muscle, back pain, joint pain or stiffness.  HEMATOLOGIC:  No anemia, bleeding or bruising.  LYMPHATICS:  No enlarged nodes. No history of splenectomy.  PSYCHIATRIC:  No history of depression or anxiety.  ENDOCRINOLOGIC:  No reports of sweating, cold or heat intolerance. No polyuria or polydipsia.          ________________________________________________  PHYSICAL EXAM    Vital Signs Last 24 Hrs  T(C): 37.3 (09 Aug 2022 13:45), Max: 37.6 (09 Aug 2022 05:25)  T(F): 99.2 (09 Aug 2022 13:45), Max: 99.6 (09 Aug 2022 05:25)  HR: 58 (09 Aug 2022 13:45) (55 - 58)  BP: 157/55 (09 Aug 2022 13:45) (118/74 - 157/55)  BP(mean): 79 (09 Aug 2022 13:45) (79 - 79)  RR: 17 (09 Aug 2022 13:45) (17 - 18)  SpO2: 95% (09 Aug 2022 13:45) (95% - 96%)    Parameters below as of 09 Aug 2022 13:45  Patient On (Oxygen Delivery Method): room air    Gen: NAD  HEENT: PERRL, anicteric, no oral mucositis  Resp: Clear breath sounds on auscultation. No rales, no wheezing  CVS: S1S2 present, regular  GI: BS(+), Soft, ND, NT  Extremities : BLE No edema or calf tenderness. PPP  Neuro: Awake/alert.  no new neuro deficits  Skin: warm,dry,no rash      _________________________________________________  LABS:                        10.5   5.16  )-----------( 141      ( 09 Aug 2022 07:27 )             31.4     08-08    139  |  105  |  9   ----------------------------<  82  3.3<L>   |  25  |  0.68    Ca    8.8      08 Aug 2022 06:35    TPro  5.7<L>  /  Alb  2.2<L>  /  TBili  1.1  /  DBili  0.5<H>  /  AST  35  /  ALT  124<H>  /  AlkPhos  191<H>  08-09    PT/INR - ( 09 Aug 2022 07:27 )   PT: 14.2 sec;   INR: 1.19 ratio         PTT - ( 09 Aug 2022 07:27 )  PTT:25.9 sec    CAPILLARY BLOOD GLUCOSE        COVID-19 PCR: NotDetec (09 Aug 2022 18:35)  COVID-19 PCR: NotDetec (05 Aug 2022 03:13)      RADIOLOGY & ADDITIONAL TESTS:    < from: MR MRCP w/wo IV Cont (08.05.22 @ 17:26) >    PROCEDURE DATE:  08/05/2022          INTERPRETATION:  CLINICAL INFORMATION: Question of cholangitis and   biliary stones on recent CT.    COMPARISON: 8/5/2022.    CONTRAST/COMPLICATIONS:  IV Contrast: Gadavist  7 cc administered   0.5 cc discarded  Oral Contrast: NONE  Complications: None reported at time of study completion    PROCEDURE:  MRI of the abdomen was performed.  MRCP was performed.    FINDINGS:  LOWER CHEST: Bibasilar subsegmental linear atelectasis. Mild   cardiomegaly. Small hiatal hernia.    LIVER: Within normal limits.  BILE DUCTS: Mild biliary ductal dilatation with CBD measuring up to 11 mm   with multiple stones in mid to distal lumen. Mild enhancement of the wall   of the extrahepatic bile duct.  GALLBLADDER: Contracted and contains a focus of air.  SPLEEN: Scattered cysts.  PANCREAS: Innumerable tiny side branch IPMNs.  ADRENALS: Within normal limits.  KIDNEYS/URETERS: Left renal cyst.    VISUALIZED PORTIONS:  BOWEL: Within normal limits.  PERITONEUM: No ascites.  VESSELS: Separate origin of common hepatic artery from the aorta.  RETROPERITONEUM/LYMPH NODES: No lymphadenopathy.  ABDOMINAL WALL: Within normal limits.  BONES: Within normal limits.    IMPRESSION:  Choledocholithiasis with possible cholangitis.    < end of copied text >  < from: US Hepatic & Pancreatic (08.05.22 @ 10:03) >    INTERPRETATION:  CLINICAL INFORMATION: Upper abdominal pain.    COMPARISON: Abdominal ultrasound from 06/08/2018.    TECHNIQUE: Sonography of the right upper quadrant.    FINDINGS:    Liver: Within normal limits.  Bile ducts: The partially visualized proximal  extra-hepatic duct   measures 7 mm, not significantly changed in size from 6 mm on 06/08/2018.  Gallbladder: Partially contracted.  No evidence of gallstones.  Pancreas: Obscured by bowel gas and not diagnostically assessed.  Right kidney: 9.9 cm.. No hydronephrosis.  Ascites: None.  IVC: Visualized portions are within normal limits.    IMPRESSION:    The gallbladder is partially contracted.  No evidence of cholelithiasis   or acute cholecystitis.    < end of copied text >  < from: CT Abdomen and Pelvis w/ IV Cont (08.05.22 @ 01:24) >  CONTRAST/COMPLICATIONS:  IV Contrast: Omnipaque 350  90 cc administered   10 cc discarded  Oral Contrast: NONE  Complications: None reported at time of study completion    PROCEDURE:  CT of the Abdomen and Pelvis was performed.  Sagittal and coronal reformats were performed.    FINDINGS:  LOWER CHEST: Bibasilar dependent and platelike atelectasis. Heart is   enlarged.    LIVER: Within normal limits.  BILE DUCTS: Mild intrahepatic and extrahepatic biliary ductal dilatation   with common bile duct measuring up to 1 cm up to the level of the   ampulla. Mild enhancement of the walls of the bile ducts. Previously seen   pneumobilia is no longer visualized. Subtle high density in the mid   common bile duct. Small volume of air in the cystic duct and gallbladder.  GALLBLADDER: Underdistended. Pericholecystic fluid.  SPLEEN: Few tiny subcentimeter low-attenuation lesions that are too small   to characterize.  PANCREAS: Atrophy of the pancreas. No peripancreatic inflammatory changes   or peripancreatic fluid.  ADRENALS: Within normal limits.  KIDNEYS/URETERS: Kidneys enhance symmetrically without hydronephrosis.   Left renal cysts. Few tiny subcentimeter low-attenuation lesions in both   kidneys which are too small to characterize. Nonspecific symmetric   bilateral perinephric stranding.    BLADDER: Within normal limits.  REPRODUCTIVE ORGANS: Uterus and adnexa are unremarkable.    BOWEL: Small hiatus hernia. No bowel obstruction or overt bowel wall   thickening. Appendix is not discretely visualized.  PERITONEUM: No ascites, pneumoperitoneum, or loculated collection. No   mesenteric lymphadenopathy.  VESSELS: Atherosclerotic calcifications of the aortoiliac tree and   abdominal aortic branches. Normal caliber abdominal aorta.  RETROPERITONEUM/LYMPH NODES: No lymphadenopathy.  ABDOMINALWALL: Within normal limits.  BONES: Degenerative changes of the spine.    IMPRESSION:  Mild intrahepatic and extrahepatic biliary ductal dilatation with common   bile duct measuring up to 1 cm up to the level of the ampulla. Mild   enhancement of thewalls of the bile ducts which raises concern for   cholangitis. Previously seen pneumobilia is no longer visualized. Subtle   hyperdensity in the mid common bile duct which may represent   sludge/stones. Correlate with LFTs. MRI/MRCP should be considered.    Under distended gallbladder with air in the cystic duct and gallbladder.   Nonspecific pericholecystic fluid.    < end of copied text >  < from: ERCP (08.08.22 @ 13:45) >      ERCP Findings:        ERCP scope advanced to area of papilla. It appeared as though patient had prior    sphincterotomy in past that was not complete. CBD injected after extension of    sphincterotomy.. Duct FILLED with thick sludge and multiple stones, one ~ 1 cm    in size. Duct finally cleared with innumerous sweeps. Good flow and duct emptied    of contrast. CBD also irrigated. Esophagus,stomach and duodenum grossly normal        Impressions:        Choledocholithiasis. Prior sphincterotomy appeared to have been done although    not complete . .        Plan:        Continue Abx. Needs Lap luis on THIS admission. Clears. Follow LFT's    < end of copied text >          Consultant(s) Notes Reviewed:   YES/ No

## 2022-08-09 NOTE — PROGRESS NOTE ADULT - SUBJECTIVE AND OBJECTIVE BOX
Patient is a 86y old  Female who presents with a chief complaint of Abdominal pain (08 Aug 2022 19:46)    pt seen in icu [  ], reg med floor [ x  ], bed [ x ], chair at bedside [   ], a+o x3 [ x ], lethargic [  ],  nad [ x ]    Allergies    No Known Drug Allergies  Seafood (Hives)        Vitals    T(F): 99.6 (08-09-22 @ 05:25), Max: 99.6 (08-09-22 @ 05:25)  HR: 55 (08-09-22 @ 05:25) (55 - 89)  BP: 147/64 (08-09-22 @ 05:25) (118/74 - 147/64)  RR: 18 (08-09-22 @ 05:25) (16 - 20)  SpO2: 96% (08-09-22 @ 05:25) (95% - 98%)  Wt(kg): --  CAPILLARY BLOOD GLUCOSE          Labs                          10.5   5.16  )-----------( 141      ( 09 Aug 2022 07:27 )             31.4       08-08    139  |  105  |  9   ----------------------------<  82  3.3<L>   |  25  |  0.68    Ca    8.8      08 Aug 2022 06:35    TPro  5.7<L>  /  Alb  2.2<L>  /  TBili  1.1  /  DBili  0.5<H>  /  AST  35  /  ALT  124<H>  /  AlkPhos  191<H>  08-09            .Blood Blood-Peripheral  08-06 @ 12:06   No growth to date.  --  --      .Blood Blood-Peripheral  08-06 @ 11:56   No growth to date.  --  --      Clean Catch Clean Catch (Midstream)  08-05 @ 01:48   <10,000 CFU/mL Normal Urogenital Liberty  --  --    Radiology Results      Meds    MEDICATIONS  (STANDING):  lactated ringers. 1000 milliLiter(s) (75 mL/Hr) IV Continuous <Continuous>  lidocaine   4% Patch 1 Patch Transdermal daily  montelukast 10 milliGRAM(s) Oral daily  piperacillin/tazobactam IVPB.. 3.375 Gram(s) IV Intermittent every 8 hours      MEDICATIONS  (PRN):  acetaminophen     Tablet .. 650 milliGRAM(s) Oral every 6 hours PRN Moderate Pain (4 - 6)  acetaminophen     Tablet .. 650 milliGRAM(s) Oral every 6 hours PRN Temp greater or equal to 38C (100.4F)  benzocaine 15 mG/menthol 3.6 mG Lozenge 1 Lozenge Oral three times a day PRN Sore Throat  ondansetron Injectable 4 milliGRAM(s) IV Push every 8 hours PRN Nausea and/or Vomiting      Physical Exam    Neuro :  no focal deficits  Respiratory: CTA B/L  CV: RRR, S1S2, no murmurs,   Abdominal: Soft, NT, ND +BS,  Extremities: No edema, + peripheral pulses    ASSESSMENT    sepsis   cholangitis,   choledocholithiasis,   atelectasis  h/o hypertension,   GERD,   gallstones,   PE (not currently on blood thinners)  appendectomy   HTN (hypertension)        PLAN    mrcp with Choledocholithiasis with possible cholangitis noted   cont zosyn,   id f/u   blood and ucx neg noted above   wbc wnl   gi f/u   s/p ERCP with Duct FILLED with thick sludge and multiple stones, one ~ 1 cm in size. Duct finally cleared with innumerous sweeps. Good flow and duct emptied  of contrast. CBD also irrigated. Esophagus, stomach and duodenum grossly normal  Continue Abx.   pt Needs Lap luis on THIS admission.   cont Clears.   lft improving   Protonix daily   surg f/u  pt to be scheduled for cholecystectomy  pulm f/u   incentive spirometry  Bronchodilators  chest pt   completed her treatment for PE   phys tx eval noted and rec phys tx 3-5x/week x 2-3 weeks at home w/ home PT   cont current meds

## 2022-08-09 NOTE — PROGRESS NOTE ADULT - SUBJECTIVE AND OBJECTIVE BOX
INTERVAL HPI/OVERNIGHT EVENTS:  Patient seen and examined at bedside.  Pt resting comfortably. No acute complaints.   Tolerating clear liquid diet. Denies N/V.   States that she came to the ED with abdominal pain but denies any abdominal pain at this time.   Patient denies chest pain, shortness of breath, fever, chills.     MEDICATIONS  (STANDING):  lactated ringers. 1000 milliLiter(s) (75 mL/Hr) IV Continuous <Continuous>  lidocaine   4% Patch 1 Patch Transdermal daily  montelukast 10 milliGRAM(s) Oral daily  pantoprazole    Tablet 40 milliGRAM(s) Oral before breakfast  piperacillin/tazobactam IVPB.. 3.375 Gram(s) IV Intermittent every 8 hours    MEDICATIONS  (PRN):  acetaminophen     Tablet .. 650 milliGRAM(s) Oral every 6 hours PRN Moderate Pain (4 - 6)  acetaminophen     Tablet .. 650 milliGRAM(s) Oral every 6 hours PRN Temp greater or equal to 38C (100.4F)  benzocaine 15 mG/menthol 3.6 mG Lozenge 1 Lozenge Oral three times a day PRN Sore Throat  ondansetron Injectable 4 milliGRAM(s) IV Push every 8 hours PRN Nausea and/or Vomiting      Vital Signs Last 24 Hrs  T(C): 37.6 (09 Aug 2022 05:25), Max: 37.6 (09 Aug 2022 05:25)  T(F): 99.6 (09 Aug 2022 05:25), Max: 99.6 (09 Aug 2022 05:25)  HR: 55 (09 Aug 2022 05:25) (55 - 89)  BP: 147/64 (09 Aug 2022 05:25) (118/74 - 147/64)  BP(mean): --  RR: 18 (09 Aug 2022 05:25) (16 - 20)  SpO2: 96% (09 Aug 2022 05:25) (95% - 98%)    Parameters below as of 09 Aug 2022 05:25  Patient On (Oxygen Delivery Method): room air      Physical:  General: A&Ox3. NAD.  Respiratory: non labored  Skin: warm and dry   Abdomen: Soft, nondistended, nontender, no rebound tenderness,       I&O's Detail      LABS:                        10.5   5.16  )-----------( 141      ( 09 Aug 2022 07:27 )             31.4             08-08    139  |  105  |  9   ----------------------------<  82  3.3<L>   |  25  |  0.68    Ca    8.8      08 Aug 2022 06:35    TPro  5.7<L>  /  Alb  2.2<L>  /  TBili  1.1  /  DBili  0.5<H>  /  AST  35  /  ALT  124<H>  /  AlkPhos  191<H>  08-09    PT/INR - ( 09 Aug 2022 07:27 )   PT: 14.2 sec;   INR: 1.19 ratio         PTT - ( 09 Aug 2022 07:27 )  PTT:25.9 sec

## 2022-08-10 ENCOUNTER — TRANSCRIPTION ENCOUNTER (OUTPATIENT)
Age: 87
End: 2022-08-10

## 2022-08-10 ENCOUNTER — RESULT REVIEW (OUTPATIENT)
Age: 87
End: 2022-08-10

## 2022-08-10 LAB
ALBUMIN SERPL ELPH-MCNC: 2.4 G/DL — LOW (ref 3.5–5)
ALP SERPL-CCNC: 198 U/L — HIGH (ref 40–120)
ALT FLD-CCNC: 104 U/L DA — HIGH (ref 10–60)
ANION GAP SERPL CALC-SCNC: 8 MMOL/L — SIGNIFICANT CHANGE UP (ref 5–17)
AST SERPL-CCNC: 31 U/L — SIGNIFICANT CHANGE UP (ref 10–40)
BILIRUB SERPL-MCNC: 0.9 MG/DL — SIGNIFICANT CHANGE UP (ref 0.2–1.2)
BLD GP AB SCN SERPL QL: SIGNIFICANT CHANGE UP
BUN SERPL-MCNC: 6 MG/DL — LOW (ref 7–18)
CALCIUM SERPL-MCNC: 8.9 MG/DL — SIGNIFICANT CHANGE UP (ref 8.4–10.5)
CHLORIDE SERPL-SCNC: 110 MMOL/L — HIGH (ref 96–108)
CO2 SERPL-SCNC: 26 MMOL/L — SIGNIFICANT CHANGE UP (ref 22–31)
CREAT SERPL-MCNC: 0.67 MG/DL — SIGNIFICANT CHANGE UP (ref 0.5–1.3)
EGFR: 86 ML/MIN/1.73M2 — SIGNIFICANT CHANGE UP
GLUCOSE SERPL-MCNC: 94 MG/DL — SIGNIFICANT CHANGE UP (ref 70–99)
HCT VFR BLD CALC: 33.6 % — LOW (ref 34.5–45)
HGB BLD-MCNC: 11.1 G/DL — LOW (ref 11.5–15.5)
MCHC RBC-ENTMCNC: 29 PG — SIGNIFICANT CHANGE UP (ref 27–34)
MCHC RBC-ENTMCNC: 33 GM/DL — SIGNIFICANT CHANGE UP (ref 32–36)
MCV RBC AUTO: 87.7 FL — SIGNIFICANT CHANGE UP (ref 80–100)
NRBC # BLD: 0 /100 WBCS — SIGNIFICANT CHANGE UP (ref 0–0)
PLATELET # BLD AUTO: 160 K/UL — SIGNIFICANT CHANGE UP (ref 150–400)
POTASSIUM SERPL-MCNC: 3.7 MMOL/L — SIGNIFICANT CHANGE UP (ref 3.5–5.3)
POTASSIUM SERPL-SCNC: 3.7 MMOL/L — SIGNIFICANT CHANGE UP (ref 3.5–5.3)
PROT SERPL-MCNC: 6.2 G/DL — SIGNIFICANT CHANGE UP (ref 6–8.3)
RBC # BLD: 3.83 M/UL — SIGNIFICANT CHANGE UP (ref 3.8–5.2)
RBC # FLD: 13.6 % — SIGNIFICANT CHANGE UP (ref 10.3–14.5)
SODIUM SERPL-SCNC: 144 MMOL/L — SIGNIFICANT CHANGE UP (ref 135–145)
WBC # BLD: 4.81 K/UL — SIGNIFICANT CHANGE UP (ref 3.8–10.5)
WBC # FLD AUTO: 4.81 K/UL — SIGNIFICANT CHANGE UP (ref 3.8–10.5)

## 2022-08-10 PROCEDURE — 88304 TISSUE EXAM BY PATHOLOGIST: CPT | Mod: 26

## 2022-08-10 PROCEDURE — 47563 LAPARO CHOLECYSTECTOMY/GRAPH: CPT | Mod: 79

## 2022-08-10 PROCEDURE — 99232 SBSQ HOSP IP/OBS MODERATE 35: CPT

## 2022-08-10 PROCEDURE — 47563 LAPARO CHOLECYSTECTOMY/GRAPH: CPT | Mod: AS

## 2022-08-10 DEVICE — CLIP APPLIER COVIDIEN ENDOCLIP 10MM LARGE: Type: IMPLANTABLE DEVICE | Status: FUNCTIONAL

## 2022-08-10 DEVICE — IMPLANTABLE DEVICE: Type: IMPLANTABLE DEVICE | Status: FUNCTIONAL

## 2022-08-10 DEVICE — CLIP APPLIER COVIDIEN ENDOCLIP II 10MM MED/LG: Type: IMPLANTABLE DEVICE | Status: FUNCTIONAL

## 2022-08-10 RX ORDER — ACETAMINOPHEN 500 MG
650 TABLET ORAL EVERY 6 HOURS
Refills: 0 | Status: DISCONTINUED | OUTPATIENT
Start: 2022-08-10 | End: 2022-08-11

## 2022-08-10 RX ORDER — OXYCODONE AND ACETAMINOPHEN 5; 325 MG/1; MG/1
1 TABLET ORAL EVERY 4 HOURS
Refills: 0 | Status: DISCONTINUED | OUTPATIENT
Start: 2022-08-10 | End: 2022-08-11

## 2022-08-10 RX ORDER — MONTELUKAST 4 MG/1
10 TABLET, CHEWABLE ORAL DAILY
Refills: 0 | Status: DISCONTINUED | OUTPATIENT
Start: 2022-08-10 | End: 2022-08-11

## 2022-08-10 RX ORDER — ONDANSETRON 8 MG/1
4 TABLET, FILM COATED ORAL ONCE
Refills: 0 | Status: DISCONTINUED | OUTPATIENT
Start: 2022-08-10 | End: 2022-08-10

## 2022-08-10 RX ORDER — LIDOCAINE 4 G/100G
1 CREAM TOPICAL DAILY
Refills: 0 | Status: DISCONTINUED | OUTPATIENT
Start: 2022-08-10 | End: 2022-08-11

## 2022-08-10 RX ORDER — BENZOCAINE AND MENTHOL 5; 1 G/100ML; G/100ML
1 LIQUID ORAL THREE TIMES A DAY
Refills: 0 | Status: DISCONTINUED | OUTPATIENT
Start: 2022-08-10 | End: 2022-08-11

## 2022-08-10 RX ORDER — ONDANSETRON 8 MG/1
4 TABLET, FILM COATED ORAL EVERY 8 HOURS
Refills: 0 | Status: DISCONTINUED | OUTPATIENT
Start: 2022-08-10 | End: 2022-08-11

## 2022-08-10 RX ORDER — HYDROMORPHONE HYDROCHLORIDE 2 MG/ML
0.25 INJECTION INTRAMUSCULAR; INTRAVENOUS; SUBCUTANEOUS
Refills: 0 | Status: DISCONTINUED | OUTPATIENT
Start: 2022-08-10 | End: 2022-08-10

## 2022-08-10 RX ORDER — PIPERACILLIN AND TAZOBACTAM 4; .5 G/20ML; G/20ML
3.38 INJECTION, POWDER, LYOPHILIZED, FOR SOLUTION INTRAVENOUS EVERY 8 HOURS
Refills: 0 | Status: DISCONTINUED | OUTPATIENT
Start: 2022-08-10 | End: 2022-08-11

## 2022-08-10 RX ORDER — HEPARIN SODIUM 5000 [USP'U]/ML
5000 INJECTION INTRAVENOUS; SUBCUTANEOUS EVERY 8 HOURS
Refills: 0 | Status: DISCONTINUED | OUTPATIENT
Start: 2022-08-11 | End: 2022-08-11

## 2022-08-10 RX ORDER — PANTOPRAZOLE SODIUM 20 MG/1
40 TABLET, DELAYED RELEASE ORAL
Refills: 0 | Status: DISCONTINUED | OUTPATIENT
Start: 2022-08-10 | End: 2022-08-11

## 2022-08-10 RX ADMIN — OXYCODONE AND ACETAMINOPHEN 1 TABLET(S): 5; 325 TABLET ORAL at 17:46

## 2022-08-10 RX ADMIN — PIPERACILLIN AND TAZOBACTAM 25 GRAM(S): 4; .5 INJECTION, POWDER, LYOPHILIZED, FOR SOLUTION INTRAVENOUS at 22:00

## 2022-08-10 RX ADMIN — OXYCODONE AND ACETAMINOPHEN 1 TABLET(S): 5; 325 TABLET ORAL at 18:16

## 2022-08-10 RX ADMIN — CHLORHEXIDINE GLUCONATE 1 APPLICATION(S): 213 SOLUTION TOPICAL at 05:58

## 2022-08-10 RX ADMIN — PIPERACILLIN AND TAZOBACTAM 25 GRAM(S): 4; .5 INJECTION, POWDER, LYOPHILIZED, FOR SOLUTION INTRAVENOUS at 05:31

## 2022-08-10 RX ADMIN — LIDOCAINE 1 PATCH: 4 CREAM TOPICAL at 00:25

## 2022-08-10 NOTE — PROGRESS NOTE ADULT - SUBJECTIVE AND OBJECTIVE BOX
[   ] ICU                                          [   ] CCU                                      [  X ] Medical Floor    Patient is a 86 year old female with abdominal pain. GI consulted to evaluate.          Patient is a 86 y o female with  Past medical history significant for GERD, HTN, Chronic back pain, found to be bulging discs on MRI, incidental finding of unprovoked PE on the MRI, s/p several months of eliquis now DC'd, gal bladder calcification found on that CT angio, declined surgery 2018, was for OP f/u, later found with gall stone pancreatitis with CBD stone in 2019, s/p ERCP and removal of 1.2cm stone March 2019 complicated by pneumobilia, recent ED visit last month for abdominal pain, dc home due to no change in pneumobilia, now comes in due to 2 week hx of persistent,  constant, 10/10, cramping mid epigastric pain that does not radiate, no alleviating factors, but worse with food, a/w nausea, and NBNB emesis x4 in the ED. No chest pain, no shortness of breath, no palpitations, no changes in bowel or bladder function.    Patient is comfortable. No new complaints reported, No abdominal pain, N/V, hematemesis, hematochezia, melena, fever, chills, chest pain, SOB, cough or diarrhea reported.         PAIN MANAGEMENT:  Pain Scale:                 /10  Pain Location:      Prior Colonoscopy:  Unknown    PAST MEDICAL HISTORY  HTN (hypertension)  GERD   Pulmonary emboli  Gallstones        PAST SURGICAL HISTORY  Appendectomy         Allergies    No Known Drug Allergies  Seafood (Hives)    Intolerances  None         MEDICATIONS  (STANDING):  atorvastatin 20 milliGRAM(s) Oral at bedtime  dextrose 5% + sodium chloride 0.9%. 1000 milliLiter(s) (75 mL/Hr) IV Continuous <Continuous>  dextrose 5% + sodium chloride 0.9%. 1000 milliLiter(s) (75 mL/Hr) IV Continuous <Continuous>  montelukast 10 milliGRAM(s) Oral daily  piperacillin/tazobactam IVPB.. 3.375 Gram(s) IV Intermittent every 8 hours  potassium phosphate / sodium phosphate Tablet (K-PHOS No. 2) 1 Tablet(s) Oral two times a day    MEDICATIONS  (PRN):  acetaminophen     Tablet .. 650 milliGRAM(s) Oral every 6 hours PRN Moderate Pain (4 - 6)  ondansetron Injectable 4 milliGRAM(s) IV Push every 8 hours PRN Nausea and/or Vomiting      SOCIAL HISTORY  Advanced Directives:       [  ] Full Code       [ X ] DNR  Marital Status:         [  ] M      [ X ] S      [  ] D       [  ] W  Children:       [X  ] Yes      [  ] No  Occupation:        [  ] Employed       [ X ] Unemployed       [  ] Retired  Diet:       [ X ] Regular       [  ] PEG feeding          [  ] NG tube feeding  Drug Use:           [X  ] Patient denied          [  ] Yes  Alcohol:           [ X ] No             [  ] Yes (socially)         [  ] Yes (chronic)  Tobacco:           [  ] Yes           [ X ] No      FAMILY HISTORY  [ X ] Heart Disease            [ X ] Diabetes             [ X ] HTN             [  ] Colon Cancer             [  ] Stomach Cancer              [  ] Pancreatic Cancer      VITALS  Vital Signs Last 24 Hrs  T(C): 37 (10 Aug 2022 05:05), Max: 37.3 (09 Aug 2022 13:45)  T(F): 98.6 (10 Aug 2022 05:05), Max: 99.2 (09 Aug 2022 13:45)  HR: 58 (10 Aug 2022 05:05) (58 - 65)  BP: 165/55 (10 Aug 2022 05:05) (135/56 - 165/55)  BP(mean): 79 (09 Aug 2022 13:45) (79 - 79)  RR: 16 (10 Aug 2022 05:05) (16 - 17)  SpO2: 95% (10 Aug 2022 05:05) (95% - 95%)  Parameters below as of 10 Aug 2022 05:05  Patient On (Oxygen Delivery Method): room air       MEDICATIONS  (STANDING):  chlorhexidine 2% Cloths 1 Application(s) Topical daily  lactated ringers. 1000 milliLiter(s) (75 mL/Hr) IV Continuous <Continuous>  lidocaine   4% Patch 1 Patch Transdermal daily  montelukast 10 milliGRAM(s) Oral daily  pantoprazole    Tablet 40 milliGRAM(s) Oral before breakfast  piperacillin/tazobactam IVPB.. 3.375 Gram(s) IV Intermittent every 8 hours    MEDICATIONS  (PRN):  acetaminophen     Tablet .. 650 milliGRAM(s) Oral every 6 hours PRN Moderate Pain (4 - 6)  acetaminophen     Tablet .. 650 milliGRAM(s) Oral every 6 hours PRN Temp greater or equal to 38C (100.4F)  benzocaine 15 mG/menthol 3.6 mG Lozenge 1 Lozenge Oral three times a day PRN Sore Throat  ondansetron Injectable 4 milliGRAM(s) IV Push every 8 hours PRN Nausea and/or Vomiting                            11.1   4.81  )-----------( 160      ( 10 Aug 2022 06:20 )             33.6       08-10    144  |  110<H>  |  6<L>  ----------------------------<  94  3.7   |  26  |  0.67    Ca    8.9      10 Aug 2022 06:20    TPro  6.2  /  Alb  2.4<L>  /  TBili  0.9  /  DBili  x   /  AST  31  /  ALT  104<H>  /  AlkPhos  198<H>  08-10      PT/INR - ( 09 Aug 2022 07:27 )   PT: 14.2 sec;   INR: 1.19 ratio         PTT - ( 09 Aug 2022 07:27 )  PTT:25.9 sec

## 2022-08-10 NOTE — DIETITIAN INITIAL EVALUATION ADULT - FACTORS AFF FOOD INTAKE
chronic back pain, Atelectasis, GERD/[place]/ethnic/cultural/personal food preferences/other (specify)

## 2022-08-10 NOTE — PROGRESS NOTE ADULT - PROBLEM SELECTOR PLAN 5
-discharge disposition home pending shwetha smith this admission    Spoke to pt's daughter Lyn at bedside, updated on clinical course, treatment plan/options, all questions answered
scd for now   AC on hold for ERCP  GI ppx: PPI
-discharge disposition home pending shwetha smith today

## 2022-08-10 NOTE — CHART NOTE - NSCHARTNOTEFT_GEN_A_CORE
Patient seen at bedside with family present  Questions answered.  NPO for procedure today    Vital Signs Last 24 Hrs  T(F): 98.6 (08-10-22 @ 05:05), Max: 99.2 (08-09-22 @ 13:45)  HR: 58 (08-10-22 @ 05:05)  BP: 165/55 (08-10-22 @ 05:05)  RR: 16 (08-10-22 @ 05:05)  SpO2: 95% (08-10-22 @ 05:05)    LABS:                        11.1   4.81  )-----------( 160      ( 10 Aug 2022 06:20 )             33.6     08-10    144  |  110<H>  |  6<L>  ----------------------------<  94  3.7   |  26  |  0.67    Ca    8.9      10 Aug 2022 06:20    TPro  6.2  /  Alb  2.4<L>  /  TBili  0.9  /  DBili  x   /  AST  31  /  ALT  104<H>  /  AlkPhos  198<H>  08-10    PT/INR - ( 09 Aug 2022 07:27 )   PT: 14.2 sec;   INR: 1.19 ratio       PTT - ( 09 Aug 2022 07:27 )  PTT:25.9 sec    86 year old female with cholelithiasis. S/p ERCP 2/2 cholangitis    - laparoscopic cholecystectomy today  - labs reviewed  - patient agreeable  - family aware  - discuss with Dr. Trent

## 2022-08-10 NOTE — PROGRESS NOTE ADULT - NEGATIVE GASTROINTESTINAL SYMPTOMS
no nausea/no vomiting/no diarrhea/no melena/no hematochezia/no steatorrhea/no jaundice/no hiccoughs

## 2022-08-10 NOTE — DIETITIAN INITIAL EVALUATION ADULT - PERTINENT LABORATORY DATA
08-10    144  |  110<H>  |  6<L>  ----------------------------<  94  3.7   |  26  |  0.67    Ca    8.9      10 Aug 2022 06:20    TPro  6.2  /  Alb  2.4<L>  /  TBili  0.9  /  DBili  x   /  AST  31  /  ALT  104<H>  /  AlkPhos  198<H>  08-10  A1C with Estimated Average Glucose Result: 6.2 % (08-06-22 @ 11:56)

## 2022-08-10 NOTE — PROGRESS NOTE ADULT - SUBJECTIVE AND OBJECTIVE BOX
GI Progress Note    Patient is a 86y old  Female who presents with a chief complaint of Abdominal pain (10 Aug 2022 11:43)       GI INTERVAL HPI/OVERNIGHT EVENTS: no new complaints.  Pt seen and examined this morning.    # [id number]  Pt awake/alert, sitting up in chair, endorses feeling much better, "everything is good." Last BM yesterday, normal.   Maintaining NPO for lap luis today per Surgery.     MEDICATIONS  (STANDING):  chlorhexidine 2% Cloths 1 Application(s) Topical daily  lactated ringers. 1000 milliLiter(s) (75 mL/Hr) IV Continuous <Continuous>  lidocaine   4% Patch 1 Patch Transdermal daily  montelukast 10 milliGRAM(s) Oral daily  pantoprazole    Tablet 40 milliGRAM(s) Oral before breakfast  piperacillin/tazobactam IVPB.. 3.375 Gram(s) IV Intermittent every 8 hours    MEDICATIONS  (PRN):  acetaminophen     Tablet .. 650 milliGRAM(s) Oral every 6 hours PRN Moderate Pain (4 - 6)  acetaminophen     Tablet .. 650 milliGRAM(s) Oral every 6 hours PRN Temp greater or equal to 38C (100.4F)  benzocaine 15 mG/menthol 3.6 mG Lozenge 1 Lozenge Oral three times a day PRN Sore Throat  ondansetron Injectable 4 milliGRAM(s) IV Push every 8 hours PRN Nausea and/or Vomiting      __________________________________________________  REVIEW OF SYSTEMS:    In addition to above,   Patient denies fever, chills, headache, cough, SOB, chest discomfort, N/V/D/C, abdominal discomfort, dysuria, urinary frequency/urgency, joint/muscle discomfort.            ________________________________________________  PHYSICAL EXAM    Vital Signs Last 24 Hrs  T(C): 37 (10 Aug 2022 05:05), Max: 37.3 (09 Aug 2022 13:45)  T(F): 98.6 (10 Aug 2022 05:05), Max: 99.2 (09 Aug 2022 13:45)  HR: 58 (10 Aug 2022 05:05) (58 - 65)  BP: 165/55 (10 Aug 2022 05:05) (135/56 - 165/55)  BP(mean): 79 (09 Aug 2022 13:45) (79 - 79)  RR: 16 (10 Aug 2022 05:05) (16 - 17)  SpO2: 95% (10 Aug 2022 05:05) (95% - 95%)    Parameters below as of 10 Aug 2022 05:05  Patient On (Oxygen Delivery Method): room air    Gen: NAD  HEENT: PERRL, anicteric, no oral mucositis  Resp: Clear breath sounds on auscultation. No rales, no wheezing  CVS: S1S2 present, regular  GI: BS(+), Soft, ND, NT  Extremities : BLE No edema or calf tenderness. PPP  Neuro: Awake/alert.  no new neuro deficits  Skin: warm,dry,no rash  _________________________________________________  LABS:                        11.1   4.81  )-----------( 160      ( 10 Aug 2022 06:20 )             33.6     08-10    144  |  110<H>  |  6<L>  ----------------------------<  94  3.7   |  26  |  0.67    Ca    8.9      10 Aug 2022 06:20    TPro  6.2  /  Alb  2.4<L>  /  TBili  0.9  /  DBili  x   /  AST  31  /  ALT  104<H>  /  AlkPhos  198<H>  08-10    PT/INR - ( 09 Aug 2022 07:27 )   PT: 14.2 sec;   INR: 1.19 ratio         PTT - ( 09 Aug 2022 07:27 )  PTT:25.9 sec    CAPILLARY BLOOD GLUCOSE    COVID-19 PCR: NotDetec (09 Aug 2022 18:35)  COVID-19 PCR: NotDetec (05 Aug 2022 03:13)      RADIOLOGY & ADDITIONAL TESTS:    < from: ERCP (08.08.22 @ 13:45) >ERCP Findings:    ERCP scope advanced to area of papilla. It appeared as though patient had prior    sphincterotomy in past that was not complete. CBD injected after extension of    sphincterotomy.. Duct FILLED with thick sludge and multiple stones, one ~ 1 cm    in size. Duct finally cleared with innumerous sweeps. Good flow and duct emptied    of contrast. CBD also irrigated. Esophagus,stomach and duodenum grossly normal      Impressions:    Choledocholithiasis. Prior sphincterotomy appeared to have been done although    not complete .     Plan: Continue Abx. Needs Lap luis on THIS admission. Clears. Follow LFT's  [place]    < end of copied text >      < from: MR MRCP w/wo IV Cont (08.05.22 @ 17:26) >  COMPARISON: 8/5/2022.    CONTRAST/COMPLICATIONS:  IV Contrast: Gadavist  7 cc administered   0.5 cc discarded  Oral Contrast: NONE  Complications: None reported at time of study completion    PROCEDURE:  MRI of the abdomen was performed.  MRCP was performed.    FINDINGS:  LOWER CHEST: Bibasilar subsegmental linear atelectasis. Mild   cardiomegaly. Small hiatal hernia.    LIVER: Within normal limits.  BILE DUCTS: Mild biliary ductal dilatation with CBD measuring up to 11 mm   with multiple stones in mid to distal lumen. Mild enhancement of the wall   of the extrahepatic bile duct.  GALLBLADDER: Contracted and contains a focus of air.  SPLEEN: Scattered cysts.  PANCREAS: Innumerable tiny side branch IPMNs.  ADRENALS: Within normal limits.  KIDNEYS/URETERS: Left renal cyst.    VISUALIZED PORTIONS:  BOWEL: Within normal limits.  PERITONEUM: No ascites.  VESSELS: Separate origin of common hepatic artery from the aorta.  RETROPERITONEUM/LYMPH NODES: No lymphadenopathy.  ABDOMINAL WALL: Within normal limits.  BONES: Within normal limits.    IMPRESSION:  Choledocholithiasis with possible cholangitis.    < end of copied text >  < from: US Hepatic & Pancreatic (08.05.22 @ 10:03) >  FINDINGS:    Liver: Within normal limits.  Bile ducts: The partially visualized proximal  extra-hepatic duct   measures 7 mm, not significantly changed in size from 6 mm on 06/08/2018.  Gallbladder: Partially contracted.  No evidence of gallstones.  Pancreas: Obscured by bowel gas and not diagnostically assessed.  Right kidney: 9.9 cm.. No hydronephrosis.  Ascites: None.  IVC: Visualized portions are within normal limits.    IMPRESSION:    The gallbladder is partially contracted.  No evidence of cholelithiasis   or acute cholecystitis.    < end of copied text >  < from: CT Abdomen and Pelvis w/ IV Cont (08.05.22 @ 01:24) >  COMPARISON: 7/23/2022.    CONTRAST/COMPLICATIONS:  IV Contrast: Omnipaque 350  90 cc administered   10 cc discarded  Oral Contrast: NONE  Complications: None reported at time of study completion    PROCEDURE:  CT of the Abdomen and Pelvis was performed.  Sagittal and coronal reformats were performed.    FINDINGS:  LOWER CHEST: Bibasilar dependent and platelike atelectasis. Heart is   enlarged.    LIVER: Within normal limits.  BILE DUCTS: Mild intrahepatic and extrahepatic biliary ductal dilatation   with common bile duct measuring up to 1 cm up to the level of the   ampulla. Mild enhancement of the walls of the bile ducts. Previously seen   pneumobilia is no longer visualized. Subtle high density in the mid   common bile duct. Small volume of air in the cystic duct and gallbladder.  GALLBLADDER: Underdistended. Pericholecystic fluid.  SPLEEN: Few tiny subcentimeter low-attenuation lesions that are too small   to characterize.  PANCREAS: Atrophy of the pancreas. No peripancreatic inflammatory changes   or peripancreatic fluid.  ADRENALS: Within normal limits.  KIDNEYS/URETERS: Kidneys enhance symmetrically without hydronephrosis.   Left renal cysts. Few tiny subcentimeter low-attenuation lesions in both   kidneys which are too small to characterize. Nonspecific symmetric   bilateral perinephric stranding.    BLADDER: Within normal limits.  REPRODUCTIVE ORGANS: Uterus and adnexa are unremarkable.    BOWEL: Small hiatus hernia. No bowel obstruction or overt bowel wall   thickening. Appendix is not discretely visualized.  PERITONEUM: No ascites, pneumoperitoneum, or loculated collection. No   mesenteric lymphadenopathy.  VESSELS: Atherosclerotic calcifications of the aortoiliac tree and   abdominal aortic branches. Normal caliber abdominal aorta.  RETROPERITONEUM/LYMPH NODES: No lymphadenopathy.  ABDOMINALWALL: Within normal limits.  BONES: Degenerative changes of the spine.    IMPRESSION:  Mild intrahepatic and extrahepatic biliary ductal dilatation with common   bile duct measuring up to 1 cm up to the level of the ampulla. Mild   enhancement of thewalls of the bile ducts which raises concern for   cholangitis. Previously seen pneumobilia is no longer visualized. Subtle   hyperdensity in the mid common bile duct which may represent   sludge/stones. Correlate with LFTs. MRI/MRCP should be considered.    Under distended gallbladder with air in the cystic duct and gallbladder.   Nonspecific pericholecystic fluid.    < end of copied text >          Consultant(s) Notes Reviewed:   YES/ No

## 2022-08-10 NOTE — PROGRESS NOTE ADULT - SUBJECTIVE AND OBJECTIVE BOX
Patient is seen and examined at the bed side, is afebrile.  She is scheduled  for Cholecystectomy today.       REVIEW OF SYSTEMS: All other review systems are negative      ALLERGIES: No Known Drug Allergies  Seafood (Hives)      Vital Signs Last 24 Hrs  T(C): 37 (10 Aug 2022 05:05), Max: 37.2 (09 Aug 2022 20:20)  T(F): 98.6 (10 Aug 2022 05:05), Max: 98.9 (09 Aug 2022 20:20)  HR: 58 (10 Aug 2022 05:05) (58 - 65)  BP: 165/55 (10 Aug 2022 05:05) (135/56 - 165/55)  BP(mean): --  RR: 16 (10 Aug 2022 05:05) (16 - 17)  SpO2: 95% (10 Aug 2022 05:05) (95% - 95%)    Parameters below as of 10 Aug 2022 05:05  Patient On (Oxygen Delivery Method): room air        PHYSICAL EXAM:  GENERAL: Not in distress   CHEST/LUNG:  Not using accessory muscles   HEART: s1 and s2 present  ABDOMEN:  ? Tenderness resolved   EXTREMITIES: No pedal  edema  CNS: Awake and Alert      LABS:                        11.1   4.81  )-----------( 160      ( 10 Aug 2022 06:20 )             33.6                  12.2   15.73 )-----------( 149      ( 05 Aug 2022 10:13 )             37.6         0810    144  |  110<H>  |  6<L>  ----------------------------<  94  3.7   |  26  |  0.67    Ca    8.9      10 Aug 2022 06:20    TPro  6.2  /  Alb  2.4<L>  /  TBili  0.9  /  DBili  x   /  AST  31  /  ALT  104<H>  /  AlkPhos  198<H>  08-10    08-08    139  |  105  |  9   ----------------------------<  82  3.3<L>   |  25  |  0.68    Ca    8.8      08 Aug 2022 06:35    TPro  5.7<L>  /  Alb  2.2<L>  /  TBili  1.1  /  DBili  0.5<H>  /  AST  35  /  ALT  124<H>  /  AlkPhos  191<H>        Urinalysis Basic - ( 05 Aug 2022 01:48 )  Color: Yellow / Appearance: Clear / S.015 / pH: x  Gluc: x / Ketone: Negative  / Bili: Negative / Urobili: 4   Blood: x / Protein: 30 mg/dL / Nitrite: Negative   Leuk Esterase: Negative / RBC: 5-10 /HPF / WBC 0-2 /HPF   Sq Epi: x / Non Sq Epi: Few /HPF / Bacteria: Few /HPF      MEDICATIONS  (STANDING):    lactated ringers. 1000 milliLiter(s) (75 mL/Hr) IV Continuous <Continuous>  lidocaine   4% Patch 1 Patch Transdermal daily  montelukast 10 milliGRAM(s) Oral daily  pantoprazole    Tablet 40 milliGRAM(s) Oral before breakfast      RADIOLOGY & ADDITIONAL TESTS:    22: CT Abdomen and Pelvis w/ IV Cont (22 @ 01:24) Mild intrahepatic and extrahepatic biliary ductal dilatation with common   bile duct measuring up to 1 cm up to the level of the ampulla. Mild  enhancement of the walls of the bile ducts which raises concern for   cholangitis. Previously seen pneumobilia is no longer visualized. Subtle hyperdensity in the mid common bile duct which may represent   sludge/stones. Correlate with LFTs. MRI/MRCP should be considered.    Under distended gallbladder with air in the cystic duct and gallbladder.  Nonspecific pericholecystic fluid.      22: US Hepatic & Pancreatic (22 @ 10:03) The gallbladder is partially contracted.  No evidence of cholelithiasis or acute cholecystitis.        MICROBIOLOGY DATA:    Culture - Blood (22 @ 12:06)   Specimen Source: .Blood Blood-Peripheral   Culture Results: No growth to date.     Culture - Blood (22 @ 11:56)   Specimen Source: .Blood Blood-Peripheral   Culture Results: No growth to date.     COVID-19 PCR . (22 @ 03:13)   COVID-19 PCR: NotDetec:

## 2022-08-10 NOTE — DIETITIAN INITIAL EVALUATION ADULT - NS FNS DIET ORDER
Diet, NPO after Midnight:      NPO Start Date: 09-Aug-2022,   NPO Start Time: 23:59 (08-09-22 @ 17:08)

## 2022-08-10 NOTE — PROGRESS NOTE ADULT - GIT GEN HX ROS MEA POS PC
constipation/abdominal pain

## 2022-08-10 NOTE — DIETITIAN INITIAL EVALUATION ADULT - PERTINENT MEDS FT
MEDICATIONS  (STANDING):  chlorhexidine 2% Cloths 1 Application(s) Topical daily  lactated ringers. 1000 milliLiter(s) (75 mL/Hr) IV Continuous <Continuous>  lidocaine   4% Patch 1 Patch Transdermal daily  montelukast 10 milliGRAM(s) Oral daily  pantoprazole    Tablet 40 milliGRAM(s) Oral before breakfast  piperacillin/tazobactam IVPB.. 3.375 Gram(s) IV Intermittent every 8 hours    MEDICATIONS  (PRN):  acetaminophen     Tablet .. 650 milliGRAM(s) Oral every 6 hours PRN Moderate Pain (4 - 6)  acetaminophen     Tablet .. 650 milliGRAM(s) Oral every 6 hours PRN Temp greater or equal to 38C (100.4F)  benzocaine 15 mG/menthol 3.6 mG Lozenge 1 Lozenge Oral three times a day PRN Sore Throat  ondansetron Injectable 4 milliGRAM(s) IV Push every 8 hours PRN Nausea and/or Vomiting

## 2022-08-10 NOTE — PROGRESS NOTE ADULT - ASSESSMENT
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  Pt admitted for suspected cholangitis, started on IVF, bowel rest and abx. GI Dr. Garza on board. S/p ERCP on 8/8 which showed choledocholithiasis, pt recommended for lap luis. Surgery following, for lap luis today

## 2022-08-10 NOTE — PROGRESS NOTE ADULT - CONSTITUTIONAL
well-groomed/no distress/cachectic

## 2022-08-10 NOTE — PROGRESS NOTE ADULT - ASSESSMENT
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 On admission, she found to have low grade fever, Leukocytosis and elevated Transaminitis. The Ct abd/pelvis shows Mild intrahepatic and extrahepatic biliary ductal dilatation with common bile duct measuring up to 1 cm up to the level of the ampulla. Mild  enhancement of the walls of the bile ducts which raises concern for  cholangitis. Previously seen pneumobilia is no longer visualized. She has started on Zosyn and the ID consult requested to assist with further evaluation and antibiotic management.     # Sepsis ( Low grade fever + Leukocytosis)  # Cholangitis - on CT abd/pelvis-  s/p ERCP - 8/8/22- found Choledocholithiasis   # Transaminitis- improving, ? transient  obstruction     would recommend:    1. NPO  for cholecystectomy tomorrow  2. Trend LFts , trending down   3. Continue Zosyn until work up is done  4. OOB to chair       Attending Attestation:    Spent more than 35 minutes on total encounter, more than 50 % of the visit was spent counseling and/or coordinating care by the Attending physician.  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 On admission, she found to have low grade fever, Leukocytosis and elevated Transaminitis. The Ct abd/pelvis shows Mild intrahepatic and extrahepatic biliary ductal dilatation with common bile duct measuring up to 1 cm up to the level of the ampulla. Mild  enhancement of the walls of the bile ducts which raises concern for  cholangitis. Previously seen pneumobilia is no longer visualized. She has started on Zosyn and the ID consult requested to assist with further evaluation and antibiotic management.     # Sepsis ( Low grade fever + Leukocytosis)  # Cholangitis - on CT abd/pelvis-  s/p ERCP - 8/8/22- found Choledocholithiasis   # Transaminitis- improving, ? transient  obstruction     would recommend:    1. Post- op Labs  2. Pain management as needed  3. Continue Zosyn until work up is done  4. OOB to chair       Attending Attestation:    Spent more than 35 minutes on total encounter, more than 50 % of the visit was spent counseling and/or coordinating care by the Attending physician.  Complexity:  - Sepsis  - Cholangitis  - Gall bladder stone

## 2022-08-10 NOTE — PROGRESS NOTE ADULT - SUBJECTIVE AND OBJECTIVE BOX
"  Outpatient Physical Therapy  DAILY TREATMENT     [facility] [address]  [address]  [place] [address]  Phone:  [phone]  Fax:  [phone]    Date: 12/18/2020    Patient: Marya Bhardwaj  YOB: 1981  MRN: [id number]     Patient is a 86y old  Female who presents with a chief complaint of Abdominal pain (10 Aug 2022 07:49)  Seen and examined using  [facility] ID # [id number]    OVERNIGHT EVENTS: sitting up in chair, offering no new complaints     REVIEW OF SYSTEMS:  CONSTITUTIONAL: No fever, chills  NECK: No pain or stiffness  RESPIRATORY: No cough, SOB  CARDIOVASCULAR: No chest pain, palpitations  GASTROINTESTINAL: No abdominal pain. No nausea, vomiting, or diarrhea  GENITOURINARY: No dysuria  NEUROLOGICAL: No HA  MUSCULOSKELETAL: No joint pain or swelling; No muscle, back, or extremity pain    T(C): 37 (08-10-22 @ 05:05), Max: 37.3 (08-09-22 @ 13:45)  HR: 58 (08-10-22 @ 05:05) (58 - 65)  BP: 165/55 (08-10-22 @ 05:05) (135/56 - 165/55)  RR: 16 (08-10-22 @ 05:05) (16 - 17)  SpO2: 95% (08-10-22 @ 05:05) (95% - 95%)  Wt(kg): --Vital Signs Last 24 Hrs  T(C): 37 (10 Aug 2022 05:05), Max: 37.3 (09 Aug 2022 13:45)  T(F): 98.6 (10 Aug 2022 05:05), Max: 99.2 (09 Aug 2022 13:45)  HR: 58 (10 Aug 2022 05:05) (58 - 65)  BP: 165/55 (10 Aug 2022 05:05) (135/56 - 165/55)  BP(mean): 79 (09 Aug 2022 13:45) (79 - 79)  RR: 16 (10 Aug 2022 05:05) (16 - 17)  SpO2: 95% (10 Aug 2022 05:05) (95% - 95%)    Parameters below as of 10 Aug 2022 05:05  Patient On (Oxygen Delivery Method): room air    MEDICATIONS  (STANDING):  chlorhexidine 2% Cloths 1 Application(s) Topical daily  lactated ringers. 1000 milliLiter(s) (75 mL/Hr) IV Continuous <Continuous>  lidocaine   4% Patch 1 Patch Transdermal daily  montelukast 10 milliGRAM(s) Oral daily  pantoprazole    Tablet 40 milliGRAM(s) Oral before breakfast  piperacillin/tazobactam IVPB.. 3.375 Gram(s) IV Intermittent every 8 hours    MEDICATIONS  (PRN):  acetaminophen     Tablet .. 650 milliGRAM(s) Oral every 6 hours PRN Moderate Pain (4 - 6)  acetaminophen     Tablet .. 650 milliGRAM(s) Oral every 6 hours PRN Temp greater or equal to 38C (100.4F)  benzocaine 15 mG/menthol 3.6 mG Lozenge 1 Lozenge Oral three times a day PRN Sore Throat  ondansetron Injectable 4 milliGRAM(s) IV Push every 8 hours PRN Nausea and/or Vomiting    PHYSICAL EXAM:  GENERAL: NAD  EYES: clear conjunctiva  ENMT: Moist mucous membranes  NECK: Supple, No JVD  CHEST/LUNG: Clear to auscultation bilaterally; No rales, rhonchi, wheezing, or rubs  HEART: S1, S2, Regular rate and rhythm  ABDOMEN: Soft, Nontender, Nondistended; Bowel sounds present  NEURO: Alert & Oriented X3  EXTREMITIES: No LE edema, no calf tenderness  SKIN: No rashes or lesions    Consultant(s) Notes Reviewed:  [x ] YES  [ ] NO  Care Discussed with Consultants/Other Providers [ x] YES  [ ] NO    LABS:                        11.1   4.81  )-----------( 160      ( 10 Aug 2022 06:20 )             33.6     08-10    144  |  110<H>  |  6<L>  ----------------------------<  94  3.7   |  26  |  0.67    Ca    8.9      10 Aug 2022 06:20    TPro  6.2  /  Alb  2.4<L>  /  TBili  0.9  /  DBili  x   /  AST  31  /  ALT  104<H>  /  AlkPhos  198<H>  08-10    PT/INR - ( 09 Aug 2022 07:27 )   PT: 14.2 sec;   INR: 1.19 ratio       PTT - ( 09 Aug 2022 07:27 )  PTT:25.9 sec  CAPILLARY BLOOD GLUCOSE    RADIOLOGY & ADDITIONAL TESTS:    < from: MR BOYCE w/wo IV Cont (08.05.22 @ 17:26) >    ACC: [id number] EXAM:  MR BOYCE WAW IC                          PROCEDURE DATE:  08/05/2022          INTERPRETATION:  CLINICAL INFORMATION: Question of cholangitis and   biliary stones on recent CT.    COMPARISON: 8/5/2022.    CONTRAST/COMPLICATIONS:  IV Contrast: Gadavist  7 cc administered   0.5 cc discarded  Oral Contrast: NONE  Complications: None reported at time of study completion    PROCEDURE:  MRI of the abdomen was performed.  MRCP was performed.    FINDINGS:  LOWER CHEST: Bibasilar subsegmental linear atelectasis. Mild   cardiomegaly. Small hiatal hernia.    LIVER: Within normal limits.  BILE DUCTS: Mild biliary ductal dilatation with CBD measuring up to 11 mm   with multiple stones in mid to distal lumen. Mild enhancement of the wall   of the extrahepatic bile duct.  GALLBLADDER: Contracted and contains a focus of air.  SPLEEN: Scattered cysts.  PANCREAS: Innumerable tiny side branch IPMNs.  ADRENALS: Within normal limits.  KIDNEYS/URETERS: Left renal cyst.    VISUALIZED PORTIONS:  BOWEL: Within normal limits.  PERITONEUM: No ascites.  VESSELS: Separate origin of common hepatic artery from the aorta.  RETROPERITONEUM/LYMPH NODES: No lymphadenopathy.  ABDOMINAL WALL: Within normal limits.  BONES: Within normal limits.    IMPRESSION:  Choledocholithiasis with possible cholangitis.

## 2022-08-10 NOTE — BRIEF OPERATIVE NOTE - NSICDXBRIEFPROCEDURE_GEN_ALL_CORE_FT
PROCEDURES:  Cholecystectomy, laparoscopic, with possible cholangiogram 10-Aug-2022 14:55:18  Darien Hernandez

## 2022-08-10 NOTE — PROGRESS NOTE ADULT - NEGATIVE GENERAL GENITOURINARY SYMPTOMS
no hematuria/no renal colic/no incontinence/no dysuria

## 2022-08-10 NOTE — PROGRESS NOTE ADULT - ASSESSMENT
1. Abdominal pain  2. Anemia  3. No evidence of acute GI bleeding  4. Gall stone  5. Cholangitis  6. Dilated biliary ducts  7. S/p ERCP    Suggestions:    1. Monitor H/H  2. Transfuse PRBC as needed  3. Follow up LFT's  4. Antibiotics IV  5. Protonix daily  6. Cholecystectomy per surgery  7. DVT prophylaxis

## 2022-08-10 NOTE — PROGRESS NOTE ADULT - SUBJECTIVE AND OBJECTIVE BOX
Patient is a 86y old  Female who presents with a chief complaint of Abdominal pain (09 Aug 2022 19:18)    pt seen in icu [  ], reg med floor [ x  ], bed [ x ], chair at bedside [   ], a+o x3 [ x ], lethargic [  ],  nad [ x ]      Allergies    No Known Drug Allergies  Seafood (Hives)        Vitals    T(F): 98.6 (08-10-22 @ 05:05), Max: 99.2 (08-09-22 @ 13:45)  HR: 58 (08-10-22 @ 05:05) (58 - 65)  BP: 165/55 (08-10-22 @ 05:05) (135/56 - 165/55)  RR: 16 (08-10-22 @ 05:05) (16 - 17)  SpO2: 95% (08-10-22 @ 05:05) (95% - 95%)  Wt(kg): --  CAPILLARY BLOOD GLUCOSE          Labs                          11.1   4.81  )-----------( 160      ( 10 Aug 2022 06:20 )             33.6           TPro  5.7<L>  /  Alb  2.2<L>  /  TBili  1.1  /  DBili  0.5<H>  /  AST  35  /  ALT  124<H>  /  AlkPhos  191<H>  08-09            .Blood Blood-Peripheral  08-06 @ 12:06   No growth to date.  --  --      .Blood Blood-Peripheral  08-06 @ 11:56   No growth to date.  --  --      Clean Catch Clean Catch (Midstream)  08-05 @ 01:48   <10,000 CFU/mL Normal Urogenital Liberty  --  --        Radiology Results      Meds    MEDICATIONS  (STANDING):  chlorhexidine 2% Cloths 1 Application(s) Topical daily  lactated ringers. 1000 milliLiter(s) (75 mL/Hr) IV Continuous <Continuous>  lidocaine   4% Patch 1 Patch Transdermal daily  montelukast 10 milliGRAM(s) Oral daily  pantoprazole    Tablet 40 milliGRAM(s) Oral before breakfast  piperacillin/tazobactam IVPB.. 3.375 Gram(s) IV Intermittent every 8 hours      MEDICATIONS  (PRN):  acetaminophen     Tablet .. 650 milliGRAM(s) Oral every 6 hours PRN Moderate Pain (4 - 6)  acetaminophen     Tablet .. 650 milliGRAM(s) Oral every 6 hours PRN Temp greater or equal to 38C (100.4F)  benzocaine 15 mG/menthol 3.6 mG Lozenge 1 Lozenge Oral three times a day PRN Sore Throat  ondansetron Injectable 4 milliGRAM(s) IV Push every 8 hours PRN Nausea and/or Vomiting      Physical Exam    Neuro :  no focal deficits  Respiratory: CTA B/L  CV: RRR, S1S2, no murmurs,   Abdominal: Soft, NT, ND +BS,  Extremities: No edema, + peripheral pulses    ASSESSMENT    sepsis   cholangitis,   choledocholithiasis,   atelectasis  h/o hypertension,   GERD,   gallstones,   PE (not currently on blood thinners)  appendectomy   HTN (hypertension)        PLAN    mrcp with Choledocholithiasis with possible cholangitis noted   cont zosyn,   id f/u   blood and ucx neg noted above   wbc wnl   gi f/u   s/p ERCP with Duct FILLED with thick sludge and multiple stones, one ~ 1 cm in size. Duct finally cleared with innumerous sweeps. Good flow and duct emptied  of contrast. CBD also irrigated. 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  Patient denies any unintentional weight.   pt Will need to follow up with MRI in 6 months as surveillance.  Continue Abx.   pt Needs Lap luis on THIS admission.   pt npo for procedure  lft improving   Protonix daily   surg f/u  Laparoscopic cholecystectomy, possible open today    pt presents with moderate risk for periop cardiac complication for the proposed surgical procedure  pulm f/u   incentive spirometry  Bronchodilators  chest pt   completed her treatment for PE   phys tx eval noted and rec phys tx 3-5x/week x 2-3 weeks at home w/ home PT   cont current meds

## 2022-08-10 NOTE — DIETITIAN INITIAL EVALUATION ADULT - OTHER INFO
Patient from home lives with family. Visited pt. in am, alert & awaking, daughter at beside, states pt. with worsening "back pain" past 2 wks, denies nausea/vomiting or diarrhea PTA, unsure of UBW & denies wt. changes? dosing wt. 145.5 Lbs on 08/10/22. Pt. tolerated clears & presently NPO, agreed to laparoscopic cholecystectomy today & awaiting, GI/team following,

## 2022-08-10 NOTE — PROGRESS NOTE ADULT - NEGATIVE NEUROLOGICAL SYMPTOMS
no syncope/no tremors/no vertigo/no loss of sensation/no difficulty walking/no headache

## 2022-08-10 NOTE — DIETITIAN INITIAL EVALUATION ADULT - ADD RECOMMEND
Advance diet with nutrition supplement as medically feasible or may consider alternate nutrition support if NPO/on clears prolonged

## 2022-08-10 NOTE — PROGRESS NOTE ADULT - SUBJECTIVE AND OBJECTIVE BOX
Patient is a 86y old  Female who presents with a chief complaint of Calculus of gallbladder without cholecystitis without obstruction  Awake, alert, comfortable in bed in NAD. No cough or sob. RUQ pain improved. For lap luis today   (10 Aug 2022 11:27)      INTERVAL HPI/OVERNIGHT EVENTS:      VITAL SIGNS:  T(F): 98.6 (08-10-22 @ 05:05)  HR: 58 (08-10-22 @ 05:05)  BP: 165/55 (08-10-22 @ 05:05)  RR: 16 (08-10-22 @ 05:05)  SpO2: 95% (08-10-22 @ 05:05)  Wt(kg): --  I&O's Detail          REVIEW OF SYSTEMS:    CONSTITUTIONAL:  No fevers, chills, sweats    HEENT:  Eyes:  No diplopia or blurred vision. ENT:  No earache, sore throat or runny nose.    CARDIOVASCULAR:  No pressure, squeezing, tightness, or heaviness about the chest; no palpitations.    RESPIRATORY:  Per HPI    GASTROINTESTINAL:  No abdominal pain, nausea, vomiting or diarrhea.    GENITOURINARY:  No dysuria, frequency or urgency.    NEUROLOGIC:  No paresthesias, fasciculations, seizures or weakness.    PSYCHIATRIC:  No disorder of thought or mood.      PHYSICAL EXAM:    Constitutional: Well developed and nourished  Eyes:Perrla  ENMT: normal  Neck:supple  Respiratory: good air entry  Cardiovascular: S1 S2 regular  Gastrointestinal: Soft, Non tender  Extremities: No edema  Vascular:normal  Neurological:Awake, alert,Ox3  Musculoskeletal:Normal      MEDICATIONS  (STANDING):  chlorhexidine 2% Cloths 1 Application(s) Topical daily  lactated ringers. 1000 milliLiter(s) (75 mL/Hr) IV Continuous <Continuous>  lidocaine   4% Patch 1 Patch Transdermal daily  montelukast 10 milliGRAM(s) Oral daily  pantoprazole    Tablet 40 milliGRAM(s) Oral before breakfast  piperacillin/tazobactam IVPB.. 3.375 Gram(s) IV Intermittent every 8 hours    MEDICATIONS  (PRN):  acetaminophen     Tablet .. 650 milliGRAM(s) Oral every 6 hours PRN Moderate Pain (4 - 6)  acetaminophen     Tablet .. 650 milliGRAM(s) Oral every 6 hours PRN Temp greater or equal to 38C (100.4F)  benzocaine 15 mG/menthol 3.6 mG Lozenge 1 Lozenge Oral three times a day PRN Sore Throat  ondansetron Injectable 4 milliGRAM(s) IV Push every 8 hours PRN Nausea and/or Vomiting      Allergies    No Known Drug Allergies  Seafood (Hives)    Intolerances        LABS:                        11.1   4.81  )-----------( 160      ( 10 Aug 2022 06:20 )             33.6     08-10    144  |  110<H>  |  6<L>  ----------------------------<  94  3.7   |  26  |  0.67    Ca    8.9      10 Aug 2022 06:20    TPro  6.2  /  Alb  2.4<L>  /  TBili  0.9  /  DBili  x   /  AST  31  /  ALT  104<H>  /  AlkPhos  198<H>  08-10    PT/INR - ( 09 Aug 2022 07:27 )   PT: 14.2 sec;   INR: 1.19 ratio         PTT - ( 09 Aug 2022 07:27 )  PTT:25.9 sec          CAPILLARY BLOOD GLUCOSE        pro-bnp 188 08-04 @ 20:35     d-dimer --  08-04 @ 20:35      RADIOLOGY & ADDITIONAL TESTS:    CXR:    Ct scan chest:    ekg;    echo:

## 2022-08-10 NOTE — PROGRESS NOTE ADULT - ASSESSMENT
86 y o female with  Hx of GERD, HTN, Chronic back pain, found to be bulging discs on MRI, incidental finding of unprovoked PE on the MRI, s/p several months of eliquis now DC'd, gall bladder calcification found on that CT angio in 2018. Admitted wtih 2 week hx of persistent,  constant, 10/10, cramping mid epigastric pain . [facility] consulted for CBD dilation and obstruction.

## 2022-08-11 ENCOUNTER — TRANSCRIPTION ENCOUNTER (OUTPATIENT)
Age: 87
End: 2022-08-11

## 2022-08-11 VITALS
DIASTOLIC BLOOD PRESSURE: 57 MMHG | HEART RATE: 63 BPM | OXYGEN SATURATION: 95 % | TEMPERATURE: 99 F | SYSTOLIC BLOOD PRESSURE: 143 MMHG | RESPIRATION RATE: 16 BRPM

## 2022-08-11 LAB
ANION GAP SERPL CALC-SCNC: 8 MMOL/L — SIGNIFICANT CHANGE UP (ref 5–17)
BUN SERPL-MCNC: 7 MG/DL — SIGNIFICANT CHANGE UP (ref 7–18)
CALCIUM SERPL-MCNC: 8.9 MG/DL — SIGNIFICANT CHANGE UP (ref 8.4–10.5)
CHLORIDE SERPL-SCNC: 107 MMOL/L — SIGNIFICANT CHANGE UP (ref 96–108)
CO2 SERPL-SCNC: 26 MMOL/L — SIGNIFICANT CHANGE UP (ref 22–31)
CREAT SERPL-MCNC: 0.66 MG/DL — SIGNIFICANT CHANGE UP (ref 0.5–1.3)
CULTURE RESULTS: SIGNIFICANT CHANGE UP
CULTURE RESULTS: SIGNIFICANT CHANGE UP
EGFR: 86 ML/MIN/1.73M2 — SIGNIFICANT CHANGE UP
GLUCOSE SERPL-MCNC: 106 MG/DL — HIGH (ref 70–99)
HCT VFR BLD CALC: 34.2 % — LOW (ref 34.5–45)
HGB BLD-MCNC: 11.2 G/DL — LOW (ref 11.5–15.5)
MCHC RBC-ENTMCNC: 28.6 PG — SIGNIFICANT CHANGE UP (ref 27–34)
MCHC RBC-ENTMCNC: 32.7 GM/DL — SIGNIFICANT CHANGE UP (ref 32–36)
MCV RBC AUTO: 87.2 FL — SIGNIFICANT CHANGE UP (ref 80–100)
NRBC # BLD: 0 /100 WBCS — SIGNIFICANT CHANGE UP (ref 0–0)
PLATELET # BLD AUTO: 159 K/UL — SIGNIFICANT CHANGE UP (ref 150–400)
POTASSIUM SERPL-MCNC: 3.9 MMOL/L — SIGNIFICANT CHANGE UP (ref 3.5–5.3)
POTASSIUM SERPL-SCNC: 3.9 MMOL/L — SIGNIFICANT CHANGE UP (ref 3.5–5.3)
RBC # BLD: 3.92 M/UL — SIGNIFICANT CHANGE UP (ref 3.8–5.2)
RBC # FLD: 13.9 % — SIGNIFICANT CHANGE UP (ref 10.3–14.5)
SODIUM SERPL-SCNC: 141 MMOL/L — SIGNIFICANT CHANGE UP (ref 135–145)
SPECIMEN SOURCE: SIGNIFICANT CHANGE UP
SPECIMEN SOURCE: SIGNIFICANT CHANGE UP
WBC # BLD: 6.88 K/UL — SIGNIFICANT CHANGE UP (ref 3.8–10.5)
WBC # FLD AUTO: 6.88 K/UL — SIGNIFICANT CHANGE UP (ref 3.8–10.5)

## 2022-08-11 PROCEDURE — 86900 BLOOD TYPING SEROLOGIC ABO: CPT

## 2022-08-11 PROCEDURE — 80048 BASIC METABOLIC PNL TOTAL CA: CPT

## 2022-08-11 PROCEDURE — 83605 ASSAY OF LACTIC ACID: CPT

## 2022-08-11 PROCEDURE — 80061 LIPID PANEL: CPT

## 2022-08-11 PROCEDURE — 76705 ECHO EXAM OF ABDOMEN: CPT

## 2022-08-11 PROCEDURE — 83036 HEMOGLOBIN GLYCOSYLATED A1C: CPT

## 2022-08-11 PROCEDURE — C1889: CPT

## 2022-08-11 PROCEDURE — A9585: CPT

## 2022-08-11 PROCEDURE — 84484 ASSAY OF TROPONIN QUANT: CPT

## 2022-08-11 PROCEDURE — 87086 URINE CULTURE/COLONY COUNT: CPT

## 2022-08-11 PROCEDURE — 83735 ASSAY OF MAGNESIUM: CPT

## 2022-08-11 PROCEDURE — 76000 FLUOROSCOPY <1 HR PHYS/QHP: CPT

## 2022-08-11 PROCEDURE — 99285 EMERGENCY DEPT VISIT HI MDM: CPT | Mod: 25

## 2022-08-11 PROCEDURE — 85730 THROMBOPLASTIN TIME PARTIAL: CPT

## 2022-08-11 PROCEDURE — 85025 COMPLETE CBC W/AUTO DIFF WBC: CPT

## 2022-08-11 PROCEDURE — 81001 URINALYSIS AUTO W/SCOPE: CPT

## 2022-08-11 PROCEDURE — 86850 RBC ANTIBODY SCREEN: CPT

## 2022-08-11 PROCEDURE — 74183 MRI ABD W/O CNTR FLWD CNTR: CPT

## 2022-08-11 PROCEDURE — 83690 ASSAY OF LIPASE: CPT

## 2022-08-11 PROCEDURE — 87635 SARS-COV-2 COVID-19 AMP PRB: CPT

## 2022-08-11 PROCEDURE — 86901 BLOOD TYPING SEROLOGIC RH(D): CPT

## 2022-08-11 PROCEDURE — 96374 THER/PROPH/DIAG INJ IV PUSH: CPT

## 2022-08-11 PROCEDURE — 85027 COMPLETE CBC AUTOMATED: CPT

## 2022-08-11 PROCEDURE — 80076 HEPATIC FUNCTION PANEL: CPT

## 2022-08-11 PROCEDURE — 85610 PROTHROMBIN TIME: CPT

## 2022-08-11 PROCEDURE — C1769: CPT

## 2022-08-11 PROCEDURE — 83880 ASSAY OF NATRIURETIC PEPTIDE: CPT

## 2022-08-11 PROCEDURE — 74177 CT ABD & PELVIS W/CONTRAST: CPT | Mod: MA

## 2022-08-11 PROCEDURE — 71045 X-RAY EXAM CHEST 1 VIEW: CPT

## 2022-08-11 PROCEDURE — 93005 ELECTROCARDIOGRAM TRACING: CPT

## 2022-08-11 PROCEDURE — 36415 COLL VENOUS BLD VENIPUNCTURE: CPT

## 2022-08-11 PROCEDURE — C9399: CPT

## 2022-08-11 PROCEDURE — 87040 BLOOD CULTURE FOR BACTERIA: CPT

## 2022-08-11 PROCEDURE — 88304 TISSUE EXAM BY PATHOLOGIST: CPT

## 2022-08-11 PROCEDURE — 80053 COMPREHEN METABOLIC PANEL: CPT

## 2022-08-11 PROCEDURE — 84100 ASSAY OF PHOSPHORUS: CPT

## 2022-08-11 RX ORDER — PANTOPRAZOLE SODIUM 20 MG/1
1 TABLET, DELAYED RELEASE ORAL
Qty: 30 | Refills: 0
Start: 2022-08-11 | End: 2022-09-09

## 2022-08-11 RX ORDER — ACETAMINOPHEN 500 MG
2 TABLET ORAL
Qty: 0 | Refills: 0 | DISCHARGE
Start: 2022-08-11

## 2022-08-11 RX ADMIN — HEPARIN SODIUM 5000 UNIT(S): 5000 INJECTION INTRAVENOUS; SUBCUTANEOUS at 05:58

## 2022-08-11 RX ADMIN — PANTOPRAZOLE SODIUM 40 MILLIGRAM(S): 20 TABLET, DELAYED RELEASE ORAL at 05:59

## 2022-08-11 RX ADMIN — OXYCODONE AND ACETAMINOPHEN 1 TABLET(S): 5; 325 TABLET ORAL at 10:56

## 2022-08-11 RX ADMIN — OXYCODONE AND ACETAMINOPHEN 1 TABLET(S): 5; 325 TABLET ORAL at 11:29

## 2022-08-11 RX ADMIN — LIDOCAINE 1 PATCH: 4 CREAM TOPICAL at 12:34

## 2022-08-11 RX ADMIN — PIPERACILLIN AND TAZOBACTAM 25 GRAM(S): 4; .5 INJECTION, POWDER, LYOPHILIZED, FOR SOLUTION INTRAVENOUS at 05:59

## 2022-08-11 RX ADMIN — MONTELUKAST 10 MILLIGRAM(S): 4 TABLET, CHEWABLE ORAL at 12:34

## 2022-08-11 NOTE — PROGRESS NOTE ADULT - PROBLEM SELECTOR PROBLEM 2
Atelectasis
Atelectasis
IPMN (intraductal papillary mucinous neoplasm)
Atelectasis
IPMN (intraductal papillary mucinous neoplasm)
IPMN (intraductal papillary mucinous neoplasm)
Atelectasis
Pulmonary emboli
Atelectasis
Atelectasis
Pulmonary emboli
Abdominal pain

## 2022-08-11 NOTE — PROGRESS NOTE ADULT - PROBLEM SELECTOR PROBLEM 1
Choledocholithiasis
Choledocholithiasis
Cholangitis
Choledocholithiasis
Cholangitis
Choledocholithiasis
Cholangitis
Choledocholithiasis
Cholangitis

## 2022-08-11 NOTE — DISCHARGE NOTE NURSING/CASE MANAGEMENT/SOCIAL WORK - NSDCPEFALRISK_GEN_ALL_CORE
For information on Fall & Injury Prevention, visit: [email or web address] OR  [email or web address] OR  https://www.cdc.gov/steadi/patient.html

## 2022-08-11 NOTE — PROGRESS NOTE ADULT - SUBJECTIVE AND OBJECTIVE BOX
INTERVAL HPI/OVERNIGHT EVENTS:    Pt seen and examined at bedside. Admits to incisional pain, denies nausea or vomiting. Tolerating clear diet well. Denies fever, chills, SOB.     Vital Signs Last 24 Hrs  T(C): 37 (11 Aug 2022 05:12), Max: 37.3 (10 Aug 2022 17:30)  T(F): 98.6 (11 Aug 2022 05:12), Max: 99.2 (10 Aug 2022 17:30)  HR: 62 (11 Aug 2022 05:12) (57 - 83)  BP: 147/68 (11 Aug 2022 05:12) (129/60 - 161/66)  BP(mean): 77 (10 Aug 2022 20:33) (77 - 89)  RR: 16 (11 Aug 2022 05:12) (15 - 23)  SpO2: 96% (11 Aug 2022 05:12) (91% - 100%)    Parameters below as of 11 Aug 2022 05:12  Patient On (Oxygen Delivery Method): room air      I&O's Detail    pantoprazole    Tablet 40 milliGRAM(s) Oral before breakfast  piperacillin/tazobactam IVPB.. 3.375 Gram(s) IV Intermittent every 8 hours      Physical Exam  General: AAOx3, No acute distress  Skin: No jaundice, no icterus  Abdomen: soft,  nondistended, minimal incisional TTP, dressing c/d/i, no rebound tenderness, no guarding, no palpable masses  Extremities: non edematous, no calf pain bilaterally        Labs:                        11.1   4.81  )-----------( 160      ( 10 Aug 2022 06:20 )             33.6     08-10    144  |  110<H>  |  6<L>  ----------------------------<  94  3.7   |  26  |  0.67    Ca    8.9      10 Aug 2022 06:20    TPro  6.2  /  Alb  2.4<L>  /  TBili  0.9  /  DBili  x   /  AST  31  /  ALT  104<H>  /  AlkPhos  198<H>  08-10

## 2022-08-11 NOTE — DISCHARGE NOTE NURSING/CASE MANAGEMENT/SOCIAL WORK - PATIENT PORTAL LINK FT
You can access the FollowMyHealth Patient Portal offered by [facility] by registering at the following website: [email or web address]. By joining [facility]’s FollowMyHealth portal, you will also be able to view your health information using other applications (apps) compatible with our system.

## 2022-08-11 NOTE — PROGRESS NOTE ADULT - PROBLEM SELECTOR PLAN 2
-hx of unprovoked PE, completed Eliquis outpt   -cont  to hold Eliquis   -Heme/Onc follow up outpt
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  Patient denies any unintentional weight. Will need to follow up with MRI in 6 months as surveillance.
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  Patient denies any unintentional weight. Will need to follow up with MRI in 6 months as surveillance.
incentive spirometry  Bronchodilators  chest pt
due to cholangitis  c/w morphine PRN  NPO   zofran for N/V
incentive spirometry  Bronchodilators  chest pt
: IPMN (intraductal papillary mucinous neoplasm).   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  Patient denies any unintentional weight. Will need to follow up with MRI in 6 months as surveillance.
-hx of unprovoked PE, completed Eliquis outpt   -cont  to hold Eliquis   -Heme/Onc follow up outpt
incentive spirometry  Bronchodilators  chest pt
incentive spirometry  Bronchodilators  chest pt

## 2022-08-11 NOTE — PROGRESS NOTE ADULT - PROBLEM SELECTOR PROBLEM 3
GERD (gastroesophageal reflux disease)
GERD (gastroesophageal reflux disease)
Pulmonary emboli

## 2022-08-11 NOTE — DISCHARGE NOTE PROVIDER - NSDCCPCAREPLAN_GEN_ALL_CORE_FT
PRINCIPAL DISCHARGE DIAGNOSIS  Diagnosis: Cholelithiasis with cholangitis  Assessment and Plan of Treatment: You presented to the hospital with abdominal pain, you were found to have choledocholithiasis which is presence of gallstones you were evaluated by surgery and GI specialist, you had an ERCP done on 8/8 and you had Laparoscopic cholecystectomy done on 8/10  Continue medications as prescribed   Follow up with PCP and Surgery outpateint      SECONDARY DISCHARGE DIAGNOSES  Diagnosis: Status post laparoscopic cholecystectomy  Assessment and Plan of Treatment: You had a Laparoscopic cholecystectomy which a is surgery to remove gallstones and your gallbladder.  Care for the surgery area:  Keep the area clean and dry. You may take a shower the day after your surgery. Do not take baths, swim, or soak in a hot tub until follow up with Surgery   Check for signs of infection each day. Check the area for swelling, red streaks, or pus. Call  your surgeon right away if you see any of these.  Hug a pillow against the surgery area before you sneeze or cough. This will help prevent pain and protect the surgery area.    Diagnosis: GERD (gastroesophageal reflux disease)  Assessment and Plan of Treatment: continue taking protonix as prescibed     PRINCIPAL DISCHARGE DIAGNOSIS  Diagnosis: Cholelithiasis with cholangitis  Assessment and Plan of Treatment: You presented to the hospital with abdominal pain, you were found to have choledocholithiasis which is presence of gallstones you were evaluated by surgery and GI specialist, you had an ERCP done on 8/8 and you had Laparoscopic cholecystectomy done on 8/10  Continue medications as prescribed   Follow up with PCP and Surgery outpateint      SECONDARY DISCHARGE DIAGNOSES  Diagnosis: Status post laparoscopic cholecystectomy  Assessment and Plan of Treatment: You had a Laparoscopic cholecystectomy which a is surgery to remove gallstones and your gallbladder.  Care for the surgery area:  Keep the area clean and dry. You may take a shower the day after your surgery. Do not take baths, swim, or soak in a hot tub until follow up with Surgery   No heavy lifting   You have steristrips on the incision which would fall off on their own, do not peel them off   Check for signs of infection each day. Check the area for swelling, red streaks, or pus. Call  your surgeon right away if you see any of these.  Hug a pillow against the surgery area before you sneeze or cough. This will help prevent pain and protect the surgery area.  follow up with Surgeon within 2 weeks of discharge    Diagnosis: GERD (gastroesophageal reflux disease)  Assessment and Plan of Treatment: continue taking protonix as prescibed

## 2022-08-11 NOTE — PROGRESS NOTE ADULT - ASSESSMENT
85 y/o Female s/p Laparoscopic Cholecystectomy, IOC 8/10    -Advance diet as tolerated   -Pain medication PRN   -Abx   -OOB/Ambulate   -Incentive Spirometry   -Dc as per medical team

## 2022-08-11 NOTE — PROGRESS NOTE ADULT - PROBLEM SELECTOR PLAN 1
P/W abdominal pain . Noted to have transaminitis  MRCP showed choledocholithiasis with possible cholangitis and CBD dilation.   s/p ERCP 8/8 which shows Choledocholithiasis. Prior sphincterotomy appeared to have been done although not complete .  AST and Tbili normalized. ALT elevated, downtrending. Alk Phos uptrending.   Continue antibiotic.   Trend LFT's  Lap luis today  per Surgery.
check pending cultures  antibiotics  ID follow up  monitor temp and WBC  GI/ID/Surgical follow up
culture noted  antibiotics  ID follow up  monitor temp and WBC  GI/ID/Surgical follow up  Stable for ERCP from pulmonary standpoint
culture noted  antibiotics  Replace lytes  ID follow up  monitor temp and WBC  GI/ID/Surgical follow up  Stable for ERCP from pulmonary standpoint
-S/p ERCP on 8/8 which showed choledocholithiasis and incomplete prior sphincterotomy   -cont Zosyn   -cont clear liquid diet   -supportive measures   -pending lap luis this admission  -Surgery following
P/W abdominal pain . Noted to have transaminitis, cholangitis  MRCP showed choledocholithiasis with possible cholangitis and CBD dilation.   s/p ERCP 8/8 which shows Choledocholithiasis. Prior sphincterotomy appeared to have been done although not complete .  s/p lap luis 8/10  AST and Tbili normalized. ALT elevated, downtrending. Alk Phos uptrending.   Continue antibiotic per ID  Trend LFT's  Reconsult [facility] prn
Subtle hyperdensity in the mid common bile duct which may represent sludge/stones.   patient now agreable for the cholecystectomy if it will improve outcome, see GOC.  Plan for ERCP today  NPO  Dr. Oakley following  c/w Moncho
culture noted  antibiotics  Replace lytes  ID follow up  monitor temp and WBC  GI/ID/Surgical follow up  ERCP done and findings noted.   Laparoscopic cholecystectomy planned.
P/W abdominal pain . Noted to have transaminitis  s/p ERCP 8/8 which shows Choledocholithiasis. Prior sphincterotomy appeared to have been done although not complete .  Transaminitis downtrending  Continue antibiotic.   Trend LFT's  Lap luis tomorrow per Surgery
-S/p ERCP on 8/8 which showed choledocholithiasis and incomplete prior sphincterotomy   -cont Zosyn   -NPO   -supportive measures   -for lap luis today  -Surgery following
culture noted  antibiotics  Replace lytes  ID follow up  monitor temp and WBC  GI/ID/Surgical follow up  ERCP done and findings noted.   S/p lap luis and IOC.
culture noted  antibiotics  Replace lytes  ID follow up  monitor temp and WBC  GI/ID/Surgical follow up  ERCP done and findings noted.   Laparoscopic cholecystectomy today.

## 2022-08-11 NOTE — PROGRESS NOTE ADULT - SUBJECTIVE AND OBJECTIVE BOX
Patient is a 86y old  Female who presents with a chief complaint of Abdominal pain (11 Aug 2022 07:40)  Awake, alert, comfortable out of bed in NAD. S/p lap luis and IOC    INTERVAL HPI/OVERNIGHT EVENTS:      VITAL SIGNS:  T(F): 98.6 (08-11-22 @ 05:12)  HR: 62 (08-11-22 @ 05:12)  BP: 147/68 (08-11-22 @ 05:12)  RR: 16 (08-11-22 @ 05:12)  SpO2: 96% (08-11-22 @ 05:12)  Wt(kg): --  I&O's Detail          REVIEW OF SYSTEMS:    CONSTITUTIONAL:  No fevers, chills, sweats    HEENT:  Eyes:  No diplopia or blurred vision. ENT:  No earache, sore throat or runny nose.    CARDIOVASCULAR:  No pressure, squeezing, tightness, or heaviness about the chest; no palpitations.    RESPIRATORY:  Per HPI    GASTROINTESTINAL:  No abdominal pain, nausea, vomiting or diarrhea.    GENITOURINARY:  No dysuria, frequency or urgency.    NEUROLOGIC:  No paresthesias, fasciculations, seizures or weakness.    PSYCHIATRIC:  No disorder of thought or mood.      PHYSICAL EXAM:    Constitutional: Well developed and nourished  Eyes:Perrla  ENMT: normal  Neck:supple  Respiratory: good air entry  Cardiovascular: S1 S2 regular  Gastrointestinal: Soft, Non tender  Extremities: No edema  Vascular:normal  Neurological:Awake, alert,Ox3  Musculoskeletal:Normal      MEDICATIONS  (STANDING):  heparin   Injectable 5000 Unit(s) SubCutaneous every 8 hours  lidocaine   4% Patch 1 Patch Transdermal daily  montelukast 10 milliGRAM(s) Oral daily  pantoprazole    Tablet 40 milliGRAM(s) Oral before breakfast  piperacillin/tazobactam IVPB.. 3.375 Gram(s) IV Intermittent every 8 hours    MEDICATIONS  (PRN):  acetaminophen     Tablet .. 650 milliGRAM(s) Oral every 6 hours PRN Temp greater or equal to 38C (100.4F)  acetaminophen     Tablet .. 650 milliGRAM(s) Oral every 6 hours PRN Moderate Pain (4 - 6)  benzocaine 15 mG/menthol 3.6 mG Lozenge 1 Lozenge Oral three times a day PRN Sore Throat  ondansetron Injectable 4 milliGRAM(s) IV Push every 8 hours PRN Nausea and/or Vomiting  oxycodone    5 mG/acetaminophen 325 mG 1 Tablet(s) Oral every 4 hours PRN Moderate Pain (4 - 6)      Allergies    No Known Drug Allergies  Seafood (Hives)    Intolerances        LABS:                        11.2   6.88  )-----------( 159      ( 11 Aug 2022 07:01 )             34.2     08-11    141  |  107  |  7   ----------------------------<  106<H>  3.9   |  26  |  0.66    Ca    8.9      11 Aug 2022 07:01    TPro  6.2  /  Alb  2.4<L>  /  TBili  0.9  /  DBili  x   /  AST  31  /  ALT  104<H>  /  AlkPhos  198<H>  08-10              CAPILLARY BLOOD GLUCOSE        pro-bnp 188 08-04 @ 20:35     d-dimer --  08-04 @ 20:35      RADIOLOGY & ADDITIONAL TESTS:    CXR:    Ct scan chest:    ekg;    echo:

## 2022-08-11 NOTE — PROGRESS NOTE ADULT - NS ATTEND AMEND GEN_ALL_CORE FT
Agree with above, I have seen and examined the patient  Advance diet.  D/C planning.
- Pancreatic cysts.  - Choledocholithiasis.  - S/p ERCP.

## 2022-08-11 NOTE — PROGRESS NOTE ADULT - SUBJECTIVE AND OBJECTIVE BOX
POST-OPERATIVE NOTE    Subjective:   86y Female s/p lap luis with IOC POD #0  . Seen and examined at bed side . Denies nausea, vomiting, chest pain, sob, fevers chills. Pain is well controlled. . Voiding .    Vital Signs Last 24 Hrs  T(C): 37.1 (10 Aug 2022 20:33), Max: 37.3 (10 Aug 2022 17:30)  T(F): 98.8 (10 Aug 2022 20:33), Max: 99.2 (10 Aug 2022 17:30)  HR: 70 (10 Aug 2022 20:33) (57 - 83)  BP: 129/60 (10 Aug 2022 20:33) (129/60 - 165/55)  BP(mean): 77 (10 Aug 2022 20:33) (77 - 89)  RR: 17 (10 Aug 2022 20:33) (15 - 23)  SpO2: 91% (10 Aug 2022 20:33) (91% - 100%)    Parameters below as of 10 Aug 2022 20:33  Patient On (Oxygen Delivery Method): nasal cannula  O2 Flow (L/min): 2    I&O's Detail      Physical Exam:  General: NAD, resting comfortably in bed  Pulmonary: Nonlabored breathing, no respiratory distress  Cardiovascular: NSR, S1, S2  Abdominal: soft, NT/ND, dressing c/d/i  Extremities: no edema, no calf tenderness, distal pulses are palpable     LABS:                        11.1   4.81  )-----------( 160      ( 10 Aug 2022 06:20 )             33.6     08-10    144  |  110<H>  |  6<L>  ----------------------------<  94  3.7   |  26  |  0.67    Ca    8.9      10 Aug 2022 06:20    TPro  6.2  /  Alb  2.4<L>  /  TBili  0.9  /  DBili  x   /  AST  31  /  ALT  104<H>  /  AlkPhos  198<H>  08-10    LIVER FUNCTIONS - ( 10 Aug 2022 06:20 )  Alb: 2.4 g/dL / Pro: 6.2 g/dL / ALK PHOS: 198 U/L / ALT: 104 U/L DA / AST: 31 U/L / GGT: x             MEDICATIONS  (STANDING):  heparin   Injectable 5000 Unit(s) SubCutaneous every 8 hours  lidocaine   4% Patch 1 Patch Transdermal daily  montelukast 10 milliGRAM(s) Oral daily  pantoprazole    Tablet 40 milliGRAM(s) Oral before breakfast  piperacillin/tazobactam IVPB.. 3.375 Gram(s) IV Intermittent every 8 hours    MEDICATIONS  (PRN):  acetaminophen     Tablet .. 650 milliGRAM(s) Oral every 6 hours PRN Temp greater or equal to 38C (100.4F)  acetaminophen     Tablet .. 650 milliGRAM(s) Oral every 6 hours PRN Moderate Pain (4 - 6)  benzocaine 15 mG/menthol 3.6 mG Lozenge 1 Lozenge Oral three times a day PRN Sore Throat  ondansetron Injectable 4 milliGRAM(s) IV Push every 8 hours PRN Nausea and/or Vomiting  oxycodone    5 mG/acetaminophen 325 mG 1 Tablet(s) Oral every 4 hours PRN Moderate Pain (4 - 6)      Assessment:   86y Female who is s/p lap luis with IOC POD #0 Stable    Plan:  - Pain control prn  - Dressing change prn   - Reg diet and DC IV fluids  - Incentive Spirometry  - OOB and ambulating as tolerated  - F/u AM labs  - DVT ppx

## 2022-08-11 NOTE — DISCHARGE NOTE PROVIDER - NSDCMRMEDTOKEN_GEN_ALL_CORE_FT
acetaminophen 325 mg oral tablet: 2 tab(s) orally every 6 hours, As needed, Moderate Pain (4 - 6)  amoxicillin-clavulanate 875 mg-125 mg oral tablet: 1 tab(s) orally 2 times a day   olmesartan-hydrochlorothiazide 20 mg-12.5 mg oral tablet: 1 tab(s) orally once a day   Out patient Physical therapy : Apply topically to affected area once a day   pantoprazole 40 mg oral delayed release tablet: 1 tab(s) orally once a day (before a meal)  rosuvastatin 10 mg oral tablet: 1 tab(s) orally once a day (at bedtime)  Singulair 10 mg oral tablet: 1 tab(s) orally once a day

## 2022-08-11 NOTE — CHART NOTE - NSCHARTNOTEFT_GEN_A_CORE
Pt seen and examined this AM via  [facility] ID # [id number], offering no new complaints, tolerating PO diet, ambulated in hallway   Pt for discharge home today, outpt follow up reviewed with pt and pt's daughter Greta at bedside with good understanding, all questions answered     D/c plan d/w Dr. Mccabe, Dr. Maradiaga and Surgery team

## 2022-08-11 NOTE — DISCHARGE NOTE PROVIDER - PROVIDER TOKENS
PROVIDER:[TOKEN:[[id number]],FOLLOWUP:[1 week]],PROVIDER:[TOKEN:[[id number]],FOLLOWUP:[1 week]]

## 2022-08-11 NOTE — PROGRESS NOTE ADULT - PROBLEM SELECTOR PLAN 4
PPI
PPI
-dvt ppx: SCDs, hold chemo ppx prior to OR
PPI
protonix
-dvt ppx: SCDs, hold chemo ppx prior to OR

## 2022-08-11 NOTE — PROGRESS NOTE ADULT - PROVIDER SPECIALTY LIST ADULT
Gastroenterology
Infectious Disease
Internal Medicine
Internal Medicine
Infectious Disease
Internal Medicine
Surgery
Infectious Disease
Infectious Disease
Internal Medicine
Pulmonology
Surgery
Surgery
Infectious Disease
Pulmonology
Surgery
Gastroenterology
Pulmonology
Gastroenterology
Pulmonology
Gastroenterology
Internal Medicine
Internal Medicine
Gastroenterology

## 2022-08-11 NOTE — PROGRESS NOTE ADULT - SUBJECTIVE AND OBJECTIVE BOX
Patient is a 86y old  Female who presents with a chief complaint of Abdominal pain (11 Aug 2022 02:02)    pt seen in icu [  ], reg med floor [ x  ], bed [ x ], chair at bedside [   ], a+o x3 [ x ], lethargic [  ],    nad [ x ]        Allergies    No Known Drug Allergies  Seafood (Hives)        Vitals    T(F): 98.6 (08-11-22 @ 05:12), Max: 99.2 (08-10-22 @ 17:30)  HR: 62 (08-11-22 @ 05:12) (57 - 83)  BP: 147/68 (08-11-22 @ 05:12) (129/60 - 161/66)  RR: 16 (08-11-22 @ 05:12) (15 - 23)  SpO2: 96% (08-11-22 @ 05:12) (91% - 100%)  Wt(kg): --  CAPILLARY BLOOD GLUCOSE          Labs                          11.1   4.81  )-----------( 160      ( 10 Aug 2022 06:20 )             33.6       08-10    144  |  110<H>  |  6<L>  ----------------------------<  94  3.7   |  26  |  0.67    Ca    8.9      10 Aug 2022 06:20    TPro  6.2  /  Alb  2.4<L>  /  TBili  0.9  /  DBili  x   /  AST  31  /  ALT  104<H>  /  AlkPhos  198<H>  08-10            .Blood Blood-Peripheral  08-06 @ 12:06   No growth to date.  --  --      .Blood Blood-Peripheral  08-06 @ 11:56   No growth to date.  --  --      Clean Catch Clean Catch (Midstream)  08-05 @ 01:48   <10,000 CFU/mL Normal Urogenital Liberty  --  --      Clean Catch Clean Catch (Midstream)  07-23 @ 14:48   <10,000 CFU/mL Normal Urogenital Liberty  --  --          Radiology Results      Meds    MEDICATIONS  (STANDING):  heparin   Injectable 5000 Unit(s) SubCutaneous every 8 hours  lidocaine   4% Patch 1 Patch Transdermal daily  montelukast 10 milliGRAM(s) Oral daily  pantoprazole    Tablet 40 milliGRAM(s) Oral before breakfast  piperacillin/tazobactam IVPB.. 3.375 Gram(s) IV Intermittent every 8 hours      MEDICATIONS  (PRN):  acetaminophen     Tablet .. 650 milliGRAM(s) Oral every 6 hours PRN Temp greater or equal to 38C (100.4F)  acetaminophen     Tablet .. 650 milliGRAM(s) Oral every 6 hours PRN Moderate Pain (4 - 6)  benzocaine 15 mG/menthol 3.6 mG Lozenge 1 Lozenge Oral three times a day PRN Sore Throat  ondansetron Injectable 4 milliGRAM(s) IV Push every 8 hours PRN Nausea and/or Vomiting  oxycodone    5 mG/acetaminophen 325 mG 1 Tablet(s) Oral every 4 hours PRN Moderate Pain (4 - 6)      Physical Exam    Neuro :  no focal deficits  Respiratory: CTA B/L  CV: RRR, S1S2, no murmurs,   Abdominal: Soft, NT, ND +BS,  Extremities: No edema, + peripheral pulses    ASSESSMENT    sepsis   cholangitis,   choledocholithiasis,   atelectasis  h/o hypertension,   GERD,   gallstones,   PE (not currently on blood thinners)  appendectomy   HTN (hypertension)        PLAN    mrcp with Choledocholithiasis with possible cholangitis noted   cont zosyn,   id f/u   blood and ucx neg noted above   wbc wnl   gi f/u   s/p ERCP with Duct FILLED with thick sludge and multiple stones, one ~ 1 cm in size. Duct finally cleared with innumerous sweeps. Good flow and duct emptied  of contrast. CBD also irrigated. 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  Patient denies any unintentional weight.   pt Will need to follow up with MRI in 6 months as surveillance.  Continue Abx.   pt Needs Lap luis on THIS admission.   pt npo for procedure  lft improving   Protonix daily   surg f/u  Laparoscopic cholecystectomy, possible open today    pt presents with moderate risk for periop cardiac complication for the proposed surgical procedure  pulm f/u   incentive spirometry  Bronchodilators  chest pt   completed her treatment for PE   phys tx eval noted and rec phys tx 3-5x/week x 2-3 weeks at home w/ home PT   cont current meds         Patient is a 86y old  Female who presents with a chief complaint of Abdominal pain (11 Aug 2022 02:02)    pt seen in icu [  ], reg med floor [ x  ], bed [ x ], chair at bedside [   ], a+o x3 [ x ], lethargic [  ],    nad [ x ]        Allergies    No Known Drug Allergies  Seafood (Hives)        Vitals    T(F): 98.6 (08-11-22 @ 05:12), Max: 99.2 (08-10-22 @ 17:30)  HR: 62 (08-11-22 @ 05:12) (57 - 83)  BP: 147/68 (08-11-22 @ 05:12) (129/60 - 161/66)  RR: 16 (08-11-22 @ 05:12) (15 - 23)  SpO2: 96% (08-11-22 @ 05:12) (91% - 100%)  Wt(kg): --  CAPILLARY BLOOD GLUCOSE          Labs                          11.1   4.81  )-----------( 160      ( 10 Aug 2022 06:20 )             33.6       08-10    144  |  110<H>  |  6<L>  ----------------------------<  94  3.7   |  26  |  0.67    Ca    8.9      10 Aug 2022 06:20    TPro  6.2  /  Alb  2.4<L>  /  TBili  0.9  /  DBili  x   /  AST  31  /  ALT  104<H>  /  AlkPhos  198<H>  08-10            .Blood Blood-Peripheral  08-06 @ 12:06   No growth to date.  --  --      .Blood Blood-Peripheral  08-06 @ 11:56   No growth to date.  --  --      Clean Catch Clean Catch (Midstream)  08-05 @ 01:48   <10,000 CFU/mL Normal Urogenital Liberty  --  --      Radiology Results      Meds    MEDICATIONS  (STANDING):  heparin   Injectable 5000 Unit(s) SubCutaneous every 8 hours  lidocaine   4% Patch 1 Patch Transdermal daily  montelukast 10 milliGRAM(s) Oral daily  pantoprazole    Tablet 40 milliGRAM(s) Oral before breakfast  piperacillin/tazobactam IVPB.. 3.375 Gram(s) IV Intermittent every 8 hours      MEDICATIONS  (PRN):  acetaminophen     Tablet .. 650 milliGRAM(s) Oral every 6 hours PRN Temp greater or equal to 38C (100.4F)  acetaminophen     Tablet .. 650 milliGRAM(s) Oral every 6 hours PRN Moderate Pain (4 - 6)  benzocaine 15 mG/menthol 3.6 mG Lozenge 1 Lozenge Oral three times a day PRN Sore Throat  ondansetron Injectable 4 milliGRAM(s) IV Push every 8 hours PRN Nausea and/or Vomiting  oxycodone    5 mG/acetaminophen 325 mG 1 Tablet(s) Oral every 4 hours PRN Moderate Pain (4 - 6)      Physical Exam    Neuro :  no focal deficits  Respiratory: CTA B/L  CV: RRR, S1S2, no murmurs,   Abdominal: Soft, NT, ND +BS, scope site dressing cdi  Extremities: No edema, + peripheral pulses    ASSESSMENT    sepsis   cholangitis,   choledocholithiasis,   atelectasis  h/o hypertension,   GERD,   gallstones,   PE (not currently on blood thinners)  appendectomy   HTN (hypertension)        PLAN    mrcp with Choledocholithiasis with possible cholangitis noted   cont zosyn,   id f/u   blood and ucx neg noted above   wbc wnl   gi f/u   s/p ERCP with Duct FILLED with thick sludge and multiple stones, one ~ 1 cm in size. Duct finally cleared with innumerous sweeps. Good flow and duct emptied  of contrast. CBD also irrigated. 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  Patient denies any unintentional weight.   pt Will need to follow up with MRI in 6 months as surveillance.  lft improving   Protonix daily   surg f/u  s/p Cholecystectomy, laparoscopic, with cholangiogram 10-Aug-2022   ioc with no obstruction   pain control prn  Dressing change prn   Reg diet and DC IV fluids  Incentive Spirometry  OOB and ambulating as tolerated  pulm f/u   incentive spirometry  Bronchodilators  chest pt   completed her treatment for PE   phys tx eval noted and rec phys tx 3-5x/week x 2-3 weeks at home w/ home PT   cont current meds

## 2022-08-11 NOTE — PROGRESS NOTE ADULT - SUBJECTIVE AND OBJECTIVE BOX
[facility] Progress Note    Patient is a 86y old  Female who presents with a chief complaint of Abdominal pain (11 Aug 2022 11:15)       GI INTERVAL HPI/OVERNIGHT EVENTS: no new complaints.  Pt seen and examined.   Luxembourgish speaking . Translated by daughter per pt preference.   Pt endorses feeling much better, tolerating po. No BM for 2 days. Tolerating ambulation in hallway with minimal assist.     MEDICATIONS  (STANDING):  heparin   Injectable 5000 Unit(s) SubCutaneous every 8 hours  lidocaine   4% Patch 1 Patch Transdermal daily  montelukast 10 milliGRAM(s) Oral daily  pantoprazole    Tablet 40 milliGRAM(s) Oral before breakfast  piperacillin/tazobactam IVPB.. 3.375 Gram(s) IV Intermittent every 8 hours    MEDICATIONS  (PRN):  acetaminophen     Tablet .. 650 milliGRAM(s) Oral every 6 hours PRN Temp greater or equal to 38C (100.4F)  acetaminophen     Tablet .. 650 milliGRAM(s) Oral every 6 hours PRN Moderate Pain (4 - 6)  benzocaine 15 mG/menthol 3.6 mG Lozenge 1 Lozenge Oral three times a day PRN Sore Throat  ondansetron Injectable 4 milliGRAM(s) IV Push every 8 hours PRN Nausea and/or Vomiting  oxycodone    5 mG/acetaminophen 325 mG 1 Tablet(s) Oral every 4 hours PRN Moderate Pain (4 - 6)      __________________________________________________  REVIEW OF SYSTEMS:    CONSTITUTIONAL:  No fever, chills, weakness or fatigue.  HEENT:  Eyes:  No visual loss, blurred vision, double vision or yellow sclerae. Ears, Nose, Throat:  No hearing loss, sneezing, congestion, runny nose or sore throat.  SKIN:  No rash or itching.  CARDIOVASCULAR:  No chest pain, chest pressure or chest discomfort. No palpitations or edema.  RESPIRATORY: No SOB. . No  cough or sputum.  GASTROINTESTINAL:  SEE HPI  GENITOURINARY:  No dysuria, hematuria, urinary frequency  NEUROLOGICAL:  No headache, dizziness, syncope, paralysis, ataxia, numbness or tingling in the extremities. No change in bowel or bladder control.  MUSCULOSKELETAL:  No muscle, back pain, joint pain or stiffness.  HEMATOLOGIC:  No anemia, bleeding or bruising.  LYMPHATICS:  No enlarged nodes. No history of splenectomy.  PSYCHIATRIC:  No history of depression or anxiety.  ENDOCRINOLOGIC:  No reports of sweating, cold or heat intolerance. No polyuria or polydipsia.          ________________________________________________  PHYSICAL EXAM    Vital Signs Last 24 Hrs  T(C): 37 (11 Aug 2022 05:12), Max: 37.3 (10 Aug 2022 17:30)  T(F): 98.6 (11 Aug 2022 05:12), Max: 99.2 (10 Aug 2022 17:30)  HR: 62 (11 Aug 2022 05:12) (57 - 83)  BP: 147/68 (11 Aug 2022 05:12) (129/60 - 161/66)  BP(mean): 77 (10 Aug 2022 20:33) (77 - 89)  RR: 16 (11 Aug 2022 05:12) (15 - 23)  SpO2: 96% (11 Aug 2022 05:12) (91% - 100%)    Parameters below as of 11 Aug 2022 05:12  Patient On (Oxygen Delivery Method): room air    Gen: NAD        _________________________________________________  LABS:                        11.2   6.88  )-----------( 159      ( 11 Aug 2022 07:01 )             34.2     08-11    141  |  107  |  7   ----------------------------<  106<H>  3.9   |  26  |  0.66    Ca    8.9      11 Aug 2022 07:01    TPro  6.2  /  Alb  2.4<L>  /  TBili  0.9  /  DBili  x   /  AST  31  /  ALT  104<H>  /  AlkPhos  198<H>  08-10        CAPILLARY BLOOD GLUCOSE    COVID-19 PCR: NotDetec (09 Aug 2022 18:35)  COVID-19 PCR: NotDetec (05 Aug 2022 03:13)      RADIOLOGY & ADDITIONAL TESTS:      < from: MR MRCP w/wo IV Cont (08.05.22 @ 17:26) >  PROCEDURE DATE:  08/05/2022          INTERPRETATION:  CLINICAL INFORMATION: Question of cholangitis and   biliary stones on recent CT.    COMPARISON: 8/5/2022.    CONTRAST/COMPLICATIONS:  IV Contrast: Gadavist  7 cc administered   0.5 cc discarded  Oral Contrast: NONE  Complications: None reported at time of study completion    PROCEDURE:  MRI of the abdomen was performed.  MRCP was performed.    FINDINGS:  LOWER CHEST: Bibasilar subsegmental linear atelectasis. Mild   cardiomegaly. Small hiatal hernia.    LIVER: Within normal limits.  BILE DUCTS: Mild biliary ductal dilatation with CBD measuring up to 11 mm   with multiple stones in mid to distal lumen. Mild enhancement of the wall   of the extrahepatic bile duct.  GALLBLADDER: Contracted and contains a focus of air.  SPLEEN: Scattered cysts.  PANCREAS: Innumerable tiny side branch IPMNs.  ADRENALS: Within normal limits.  KIDNEYS/URETERS: Left renal cyst.    VISUALIZED PORTIONS:  BOWEL: Within normal limits.  PERITONEUM: No ascites.  VESSELS: Separate origin of common hepatic artery from the aorta.  RETROPERITONEUM/LYMPH NODES: No lymphadenopathy.  ABDOMINAL WALL: Within normal limits.  BONES: Within normal limits.    IMPRESSION:  Choledocholithiasis with possible cholangitis.    < end of copied text >  < from: US Hepatic & Pancreatic (08.05.22 @ 10:03) >    INTERPRETATION:  CLINICAL INFORMATION: Upper abdominal pain.    COMPARISON: Abdominal ultrasound from 06/08/2018.    TECHNIQUE: Sonography of the right upper quadrant.    FINDINGS:    Liver: Within normal limits.  Bile ducts: The partially visualized proximal  extra-hepatic duct   measures 7 mm, not significantly changed in size from 6 mm on 06/08/2018.  Gallbladder: Partially contracted.  No evidence of gallstones.  Pancreas: Obscured by bowel gas and not diagnostically assessed.  Right kidney: 9.9 cm.. No hydronephrosis.  Ascites: None.  IVC: Visualized portions are within normal limits.    IMPRESSION:    The gallbladder is partially contracted.  No evidence of cholelithiasis   or acute cholecystitis.    < end of copied text >  < from: ERCP (08.08.22 @ 13:45) >    ERCP Findings:        ERCP scope advanced to area of papilla. It appeared as though patient had prior    sphincterotomy in past that was not complete. CBD injected after extension of    sphincterotomy.. Duct FILLED with thick sludge and multiple stones, one ~ 1 cm    in size. Duct finally cleared with innumerous sweeps. Good flow and duct emptied    of contrast. CBD also irrigated. Esophagus,stomach and duodenum grossly normal        Impressions:Choledocholithiasis. Prior sphincterotomy appeared to have been done although    not complete . .  Plan:Continue Abx. Needs Lap luis on THIS admission. Clears. Follow LFT's        [place]    < end of copied text >        Consultant(s) Notes Reviewed:   YES         [facility] Progress Note    Patient is a 86y old  Female who presents with a chief complaint of Abdominal pain (11 Aug 2022 11:15)       GI INTERVAL HPI/OVERNIGHT EVENTS: no new complaints.  Pt seen and examined.   Romanian speaking . Translated by daughter per pt preference.   Pt endorses feeling much better, tolerating po. No BM for 2 days. Tolerating ambulation in hallway with minimal assist.     MEDICATIONS  (STANDING):  heparin   Injectable 5000 Unit(s) SubCutaneous every 8 hours  lidocaine   4% Patch 1 Patch Transdermal daily  montelukast 10 milliGRAM(s) Oral daily  pantoprazole    Tablet 40 milliGRAM(s) Oral before breakfast  piperacillin/tazobactam IVPB.. 3.375 Gram(s) IV Intermittent every 8 hours    MEDICATIONS  (PRN):  acetaminophen     Tablet .. 650 milliGRAM(s) Oral every 6 hours PRN Temp greater or equal to 38C (100.4F)  acetaminophen     Tablet .. 650 milliGRAM(s) Oral every 6 hours PRN Moderate Pain (4 - 6)  benzocaine 15 mG/menthol 3.6 mG Lozenge 1 Lozenge Oral three times a day PRN Sore Throat  ondansetron Injectable 4 milliGRAM(s) IV Push every 8 hours PRN Nausea and/or Vomiting  oxycodone    5 mG/acetaminophen 325 mG 1 Tablet(s) Oral every 4 hours PRN Moderate Pain (4 - 6)      __________________________________________________  REVIEW OF SYSTEMS:    CONSTITUTIONAL:  No fever, chills, weakness or fatigue.  HEENT:  Eyes:  No visual loss, blurred vision, double vision or yellow sclerae. Ears, Nose, Throat:  No hearing loss, sneezing, congestion, runny nose or sore throat.  SKIN:  No rash or itching.  CARDIOVASCULAR:  No chest pain, chest pressure or chest discomfort. No palpitations or edema.  RESPIRATORY: No SOB. . No  cough or sputum.  GASTROINTESTINAL:  SEE HPI  GENITOURINARY:  No dysuria, hematuria, urinary frequency  NEUROLOGICAL:  No headache, dizziness, syncope, paralysis, ataxia, numbness or tingling in the extremities. No change in bowel or bladder control.  MUSCULOSKELETAL:  No muscle, back pain, joint pain or stiffness.  HEMATOLOGIC:  No anemia, bleeding or bruising.  LYMPHATICS:  No enlarged nodes. No history of splenectomy.  PSYCHIATRIC:  No history of depression or anxiety.  ENDOCRINOLOGIC:  No reports of sweating, cold or heat intolerance. No polyuria or polydipsia.          ________________________________________________  PHYSICAL EXAM    Vital Signs Last 24 Hrs  T(C): 37 (11 Aug 2022 05:12), Max: 37.3 (10 Aug 2022 17:30)  T(F): 98.6 (11 Aug 2022 05:12), Max: 99.2 (10 Aug 2022 17:30)  HR: 62 (11 Aug 2022 05:12) (57 - 83)  BP: 147/68 (11 Aug 2022 05:12) (129/60 - 161/66)  BP(mean): 77 (10 Aug 2022 20:33) (77 - 89)  RR: 16 (11 Aug 2022 05:12) (15 - 23)  SpO2: 96% (11 Aug 2022 05:12) (91% - 100%)    Parameters below as of 11 Aug 2022 05:12  Patient On (Oxygen Delivery Method): room air    Gen: NAD  HEENT: PERRL, anicteric, no oral mucositis  Resp: Clear breath sounds on auscultation. No rales, no wheezing  CVS: S1S2 present, regular  GI: BS(+), Soft, ND, NT  Extremities : BLE No edema or calf tenderness. PPP  Neuro: Awake/alert.  no new neuro deficits  Skin: warm,dry,no rash          _________________________________________________  LABS:                        11.2   6.88  )-----------( 159      ( 11 Aug 2022 07:01 )             34.2     08-11    141  |  107  |  7   ----------------------------<  106<H>  3.9   |  26  |  0.66    Ca    8.9      11 Aug 2022 07:01    TPro  6.2  /  Alb  2.4<L>  /  TBili  0.9  /  DBili  x   /  AST  31  /  ALT  104<H>  /  AlkPhos  198<H>  08-10        CAPILLARY BLOOD GLUCOSE    COVID-19 PCR: [facility] (09 Aug 2022 18:35)  COVID-19 PCR: [facility] (05 Aug 2022 03:13)      RADIOLOGY & ADDITIONAL TESTS:      < from: MR MRCP w/wo IV Cont (08.05.22 @ 17:26) >  PROCEDURE DATE:  08/05/2022          INTERPRETATION:  CLINICAL INFORMATION: Question of cholangitis and   biliary stones on recent CT.    COMPARISON: 8/5/2022.    CONTRAST/COMPLICATIONS:  IV Contrast: Gadavist  7 cc administered   0.5 cc discarded  Oral Contrast: NONE  Complications: None reported at time of study completion    PROCEDURE:  MRI of the abdomen was performed.  MRCP was performed.    FINDINGS:  LOWER CHEST: Bibasilar subsegmental linear atelectasis. Mild   cardiomegaly. Small hiatal hernia.    LIVER: Within normal limits.  BILE DUCTS: Mild biliary ductal dilatation with CBD measuring up to 11 mm   with multiple stones in mid to distal lumen. Mild enhancement of the wall   of the extrahepatic bile duct.  GALLBLADDER: Contracted and contains a focus of air.  SPLEEN: Scattered cysts.  PANCREAS: Innumerable tiny side branch IPMNs.  ADRENALS: Within normal limits.  KIDNEYS/URETERS: Left renal cyst.    VISUALIZED PORTIONS:  BOWEL: Within normal limits.  PERITONEUM: No ascites.  VESSELS: Separate origin of common hepatic artery from the aorta.  RETROPERITONEUM/LYMPH NODES: No lymphadenopathy.  ABDOMINAL WALL: Within normal limits.  BONES: Within normal limits.    IMPRESSION:  Choledocholithiasis with possible cholangitis.    < end of copied text >  < from: US Hepatic & Pancreatic (08.05.22 @ 10:03) >    INTERPRETATION:  CLINICAL INFORMATION: Upper abdominal pain.    COMPARISON: Abdominal ultrasound from 06/08/2018.    TECHNIQUE: Sonography of the right upper quadrant.    FINDINGS:    Liver: Within normal limits.  Bile ducts: The partially visualized proximal  extra-hepatic duct   measures 7 mm, not significantly changed in size from 6 mm on 06/08/2018.  Gallbladder: Partially contracted.  No evidence of gallstones.  Pancreas: Obscured by bowel gas and not diagnostically assessed.  Right kidney: 9.9 cm.. No hydronephrosis.  Ascites: None.  IVC: Visualized portions are within normal limits.    IMPRESSION:    The gallbladder is partially contracted.  No evidence of cholelithiasis   or acute cholecystitis.    < end of copied text >  < from: ERCP (08.08.22 @ 13:45) >    ERCP Findings:        ERCP scope advanced to area of papilla. It appeared as though patient had prior    sphincterotomy in past that was not complete. CBD injected after extension of    sphincterotomy.. Duct FILLED with thick sludge and multiple stones, one ~ 1 cm    in size. Duct finally cleared with innumerous sweeps. Good flow and duct emptied    of contrast. CBD also irrigated. Esophagus,stomach and duodenum grossly normal        Impressions:Choledocholithiasis. Prior sphincterotomy appeared to have been done although    not complete . .  Plan:Continue Abx. Needs Lap luis on THIS admission. Clears. Follow LFT's        [place]    < end of copied text >        Consultant(s) Notes Reviewed:   YES

## 2022-08-11 NOTE — DISCHARGE NOTE PROVIDER - HOSPITAL COURSE
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  Pt admitted for suspected cholangitis, started on IVF, bowel rest and abx. GI Dr. Garza on board. S/p ERCP on 8/8 which showed choledocholithiasis, pt recommended for lap luis. Surgery following, s/p lap luis 8/10, diet advanced and tolerated, ambulated in room   Clinically improving, optimized for discharge with outot follow up  Please note that this a brief summary of hospital course please refer to daily progress notes and consult notes for full course and events

## 2022-08-11 NOTE — DISCHARGE NOTE PROVIDER - CARE PROVIDERS DIRECT ADDRESSES
[email or web address]

## 2022-08-11 NOTE — PROGRESS NOTE ADULT - PROBLEM SELECTOR PLAN 3
No need for further anticoagulation
-cont PPI
No need for further anticoagulation
Incidental finding of  unprovoked PE on the MRI  s/p several months of Eliquis now discontinued.   she may still need AC  hold for now in the setting of ERCP   hemonc f/u as OP
No need for further anticoagulation
No need for further anticoagulation
-cont PPI

## 2022-08-11 NOTE — DISCHARGE NOTE PROVIDER - CARE PROVIDER_API CALL
Crow Trent)  Surgery  [address] [facility], [place] [address]  Phone: [phone]  Fax: [phone]  Follow Up Time: 1 week    Prabhjot Valera  INTERNAL MEDICINE  [address] [place] [address]  Phone: [phone]  Fax: [phone]  Follow Up Time: 1 week

## 2022-08-11 NOTE — PROGRESS NOTE ADULT - PROBLEM SELECTOR PROBLEM 4
GERD (gastroesophageal reflux disease)
Prophylactic measure
GERD (gastroesophageal reflux disease)
Prophylactic measure

## 2022-08-15 LAB — SURGICAL PATHOLOGY STUDY: SIGNIFICANT CHANGE UP

## 2022-08-18 ENCOUNTER — NON-APPOINTMENT (OUTPATIENT)
Age: 87
End: 2022-08-18

## 2022-08-29 ENCOUNTER — APPOINTMENT (OUTPATIENT)
Dept: SURGERY | Facility: CLINIC | Age: 87
End: 2022-08-29

## 2022-08-29 VITALS
BODY MASS INDEX: 24.17 KG/M2 | DIASTOLIC BLOOD PRESSURE: 66 MMHG | SYSTOLIC BLOOD PRESSURE: 110 MMHG | HEIGHT: 60.83 IN | HEART RATE: 80 BPM | WEIGHT: 128 LBS

## 2022-08-29 DIAGNOSIS — K82.8 OTHER SPECIFIED DISEASES OF GALLBLADDER: ICD-10-CM

## 2022-08-29 PROCEDURE — 99024 POSTOP FOLLOW-UP VISIT: CPT

## 2022-08-29 NOTE — PLAN
[FreeTextEntry1] : Ms. MELCHOR will follow up  if needed. Warning signs, follow up, and restrictions were discussed with the patient.

## 2022-08-29 NOTE — PHYSICAL EXAM
[Calm] : calm [de-identified] : She  is alert, well-groomed, and in NAD\par   [de-identified] : anicteric.  Nasal mucosa pink, septum midline. Oral mucosa pink.  Tongue midline, Pharynx without exudates.\par   [de-identified] : Surgical wounds are  healing well.   no signs of  inflammation or infection.

## 2022-08-29 NOTE — DATA REVIEWED
[FreeTextEntry1] : [facility] Accession Number : [id number]\par Patient:   DENICE MELCHOR\par \par \par Accession:                             [id number]\par \par Collected Date/Time:                   8/10/2022 10:06 EDT\par Received Date/Time:                    8/10/2022 16:00 EDT\par \par Surgical Pathology Report - Auth (Verified)\par \par Specimen(s) Submitted\par 1  Gallbladder\par \par Final Diagnosis\par Gallbladder; cholecystectomy:\par - Chronic cholecystitis, marked\par \par Verified by: Tila Buchanan M.D.\par (Electronic Signature)\par Reported on: 08/15/22 10:31 EDT, [address]\par Phone: [phone]   Fax: [phone]\par _________________________________________________________________\par \par Clinical Information\par Cholecystitis

## 2022-08-29 NOTE — ASSESSMENT
[FreeTextEntry1] : Ms. MELCHOR is doing well, with excellent post-operative recovery. All surgical incisions are healing well and as expected. There is no evidence of infection or complication, and she is progressing as expected. Post-operative wound care, activity, restrictions and precautions reinforced. Patient instructed to refrain from any heavy lifting greater than 10-15 pounds for at least 4 weeks post-operatively. pathology results were discussed in details.  Patient's questions and concerns addressed to patient's satisfaction.

## 2022-08-29 NOTE — HISTORY OF PRESENT ILLNESS
[de-identified] : Ms. MELCHOR  is s/p laparoscopic  cholecystectomy  on 08/10/2022. Today  Ms. MELCHOR offers no complaints. patient reports no fever or  chills. patient reports occasional discomfort in the surgical area.  Her surgical incisions are healing well. No signs of inflammation, infection or exudate. patient reports good bowel movements and appetite.

## 2022-09-29 NOTE — ED ADULT NURSE NOTE - CAS EDN DISCHARGE INTERVENTIONS
Total Body Energy: 850 Protocol: NBUVB Protocol For Photochemotherapy: Baby Oil And Nbuvb: The patient received Photochemotherapy: Baby Oil and NBUVB (baby oil applied to all lesions prior to phototherapy). Protocol For Uva1: The patient received UVA1. Protocol For Nbuvb: The patient received NBUVB. Protocol For Photochemotherapy: Mineral Oil And Broad Band Uvb: The patient received Photochemotherapy: Mineral Oil and Broad Band UVB. Consent: Written consent obtained.  The risks were reviewed with the patient including but not limited to: burn, pigmentary changes, pain, blistering, scabbing, redness, increased risk of skin cancers, and the remote possibility of scarring. Protocol For Photochemotherapy For Severe Photoresponsive Dermatoses: Petrolatum And Nbuvb: The patient received Photochemotherapy for severe photoresponsive dermatoses: Petrolatum and NBUVB requiring at least 4 to 8 hours of care under direct physician supervision. Protocol For Photochemotherapy For Severe Photoresponsive Dermatoses: Puva: The patient received Photochemotherapy for severe photoresponsive dermatoses: PUVA requiring at least 4 to 8 hours of care under direct physician supervision. Protocol For Broad Band Uvb: The patient received Broad Band UVB. Total Body Time: 2:48 Protocol For Photochemotherapy: Mineral Oil And Nbuvb: The patient received Photochemotherapy: Mineral Oil and NBUVB (mineral oil applied to all lesions prior to phototherapy). Render Post-Care In The Note: yes Protocol For Photochemotherapy For Severe Photoresponsive Dermatoses: Tar And Broad Band Uvb (Goeckerman Treatment): The patient received Photochemotherapy for severe photoresponsive dermatoses: Tar and Broad Band UVB (Goeckerman treatment) requiring at least 4 to 8 hours of care under direct physician supervision. Protocol For Photochemotherapy: Tar And Nbuvb (Goeckerman Treatment): The patient received Photochemotherapy: Tar and NBUVB (Goeckerman treatment). Protocol For Nb Uva: The patient received NB UVA. Irradiance (Optional- Include Units): 5.06mw/cm2 Comments On Previous Treatment: Patient tolerated previous treatment, increased dose by 25mj. Protocol For Bath Puva: The patient received Bath PUVA. Protocol For Photochemotherapy: Tar And Broad Band Uvb (Goeckerman Treatment): The patient received Photochemotherapy: Tar and Broad Band UVB (Goeckerman treatment). Protocol For Photochemotherapy: Triamcinolone Ointment And Nbuvb: The patient received Photochemotherapy: Triamcinolone and NBUVB (triamcinolone ointment applied to all lesions prior to phototherapy). Skin Type: II Protocol For Photochemotherapy For Severe Photoresponsive Dermatoses: Petrolatum And Broad Band Uvb: The patient received Photochemotherapyfor severe photoresponsive dermatoses: Petrolatum and Broad Band UVB requiring at least 4 to 8 hours of care under direct physician supervision. Treatment Number: 69 Protocol For Uva: The patient received UVA. Protocol For Photochemotherapy: Petrolatum And Nbuvb: The patient received Photochemotherapy: Petrolatum and NBUVB (petrolatum applied to all lesions prior to phototherapy). Protocol For Protocol For Photochemotherapy For Severe Photoresponsive Dermatoses: Bath Puva: The patient received Photochemotherapy for severe photoresponsive dermatoses: Bath PUVA requiring at least 4 to 8 hours of care under direct physician supervision. Detail Level: Zone Post-Care Instructions: I reviewed with the patient in detail post-care instructions. Patient is to wear sun protection. Patients may expect sunburn like redness, discomfort and scabbing. Protocol For Photochemotherapy: Petrolatum And Broad Band Uvb: The patient received Photochemotherapy: Petrolatum and Broad Band UVB. Name Of Supervising Technician: Caitie Protocol For Puva: The patient received PUVA. Protocol For Photochemotherapy For Severe Photoresponsive Dermatoses: Tar And Nbuvb (Goeckerman Treatment): The patient received Photochemotherapy for severe photoresponsive dermatoses: Tar and NBUVB (Goeckerman treatment) requiring at least 4 to 8 hours of care under direct physician supervision. IV discontinued, cath removed intact

## 2022-10-03 NOTE — DIETITIAN INITIAL EVALUATION ADULT. - NS FNS WEIGHT USED FOR CALC
"Patient reports last week she may have slept wrong and experienced insignificant neck soreness and stiffness, primarily on the left side. She reports feeling \"shocks that kind of zapped my neck\" occasionally. Today, 10/03/22, patient reports stiffness has improved and she is able to move head without restriction.    Patient denied difficulty breathing, SOB, chest pain, numbness/weakness of extremities, loss of bowel/bladder control, difficulty swallowing, fevers, rash, or headache.     Patient states pain is 2/10 and is only present when she touches the left side of her neck.     Patient states she has experienced something like this before last year and the issue resolved quickly. Patient expressed concerns over how long this incident has lasted.    Per protocol patient was advised to be seen by a provider within 3 days. Patient states she is unavailable until next week and asked to set up an appointment then. Writer assisted patient in scheduling an appointment for 10/12/2022.     Writer advised patient to be seen in the ED if she experiences shortness of breath, chest pain, neck stiffness with a headache and unable to move head side to side, fever, rash, or difficulty swelling. Patient verbalized understanding and agreed to plan of care.     Modesta Slater RN    [facility]      Reason for Disposition    MODERATE neck pain (e.g., interferes with normal activities like work or school)    Additional Information    Negative: Shock suspected (e.g., cold/pale/clammy skin, too weak to stand, low BP, rapid pulse)    Negative: Similar pain previously and it was from 'heart attack'    Negative: Similar pain previously from 'angina' and not relieved by nitroglycerin    Negative: Difficult to awaken or acting confused (e.g., disoriented, slurred speech)    Negative: Sounds like a life-threatening emergency to the triager    Negative: Difficulty breathing or unusual sweating (e.g., sweating " "without exertion)    Negative: Chest pain lasting longer than 5 minutes    Negative: Stiff neck (can't touch chin to chest) and has headache    Negative: Stiff neck (can't touch chin to chest) and fever    Negative: Weakness of an arm or hand    Negative: Problems with bowel or bladder control    Negative: Patient sounds very sick or weak to the triager    Negative: SEVERE pain (e.g., excruciating, unable to do any normal activities)    Negative: Head is twisting to one side (or ask 'is it turning against your will?')    Negative: Fever > 103 F (39.4 C)    Negative: Fever > 100.0 F (37.8 C) and Intravenous Drug Use (IVDU)    Negative: Fever > 100.0 F (37.8 C) and has diabetes mellitus or a weak immune system (e.g., HIV positive, cancer chemotherapy, organ transplant, splenectomy, chronic steroids)    Negative: Numbness in an arm or hand (i.e., loss of sensation)    Negative: Painful rash with multiple small blisters grouped together (i.e., dermatomal distribution or 'band' or 'stripe')    Negative: High-risk adult (e.g., history of cancer, HIV, or IV Drug Use)    Negative: Patient wants to be seen    Negative: Tenderness in front of neck over windpipe    Answer Assessment - Initial Assessment Questions  1. ONSET: \"When did the pain begin?\"       Two weeks ago  2. LOCATION: \"Where does it hurt?\"       Left side of neck. \"shocks happen sporadically\"   3. PATTERN \"Does the pain come and go, or has it been constant since it started?\"       Pain comes and goes. Shocks have happened before. Last time it happened was 9/7/2021 after not sleeping for the entire night.   4. SEVERITY: \"How bad is the pain?\"  (Scale 1-10; or mild, moderate, severe)    - NO PAIN (0): no pain or only slight stiffness     - MILD (1-3): doesn't interfere with normal activities     - MODERATE (4-7): interferes with normal activities or awakens from sleep     - SEVERE (8-10):  excruciating pain, unable to do any normal activities       2/10, last " "week 6/10. Minimal pain on neck if I touch it or if I breath deeply.   5. RADIATION: \"Does the pain go anywhere else, shoot into your arms?\"      Into chest if I have to take a deep breath, does go into my stomach sometimes.   6. CORD SYMPTOMS: \"Any weakness or numbness of the arms or legs?\"      Denies  7. CAUSE: \"What do you think is causing the neck pain?\"      Unsure  8. NECK OVERUSE: \"Any recent activities that involved turning or twisting the neck?\"      Possibly slept wrong.   9. OTHER SYMPTOMS: \"Do you have any other symptoms?\" (e.g., headache, fever, chest pain, difficulty breathing, neck swelling)      Painful to take a deep breath at times.   10. PREGNANCY: \"Is there any chance you are pregnant?\" \"When was your last menstrual period?\"        No, a month ago, should be happening soon.    Protocols used: NECK PAIN OR [id number]      " ideal/Ht. 5' 4"  Lbs, current wt. = 139.9 Lbs

## 2022-10-07 NOTE — H&P ADULT - RS GEN PE MLT RESP DETAILS PC
respirations non-labored/no chest wall tenderness/no rhonchi/breath sounds equal/no wheezes/clear to auscultation bilaterally/good air movement/no rales
denies pain/discomfort

## 2022-10-19 NOTE — ED ADULT NURSE NOTE - PAIN RATING/NUMBER SCALE (0-10): REST
3 Xenograft Text: The defect edges were debeveled with a #15 scalpel blade.  Given the location of the defect, shape of the defect and the proximity to free margins a xenograft was deemed most appropriate.  The graft was then trimmed to fit the size of the defect.  The graft was then placed in the primary defect and oriented appropriately.

## 2022-11-26 ENCOUNTER — EMERGENCY (EMERGENCY)
Facility: HOSPITAL | Age: 87
LOS: 1 days | Discharge: HOME | End: 2022-11-26
Attending: EMERGENCY MEDICINE
Payer: MEDICARE

## 2022-11-26 VITALS
SYSTOLIC BLOOD PRESSURE: 151 MMHG | DIASTOLIC BLOOD PRESSURE: 74 MMHG | HEART RATE: 71 BPM | RESPIRATION RATE: 16 BRPM | HEIGHT: 63 IN | WEIGHT: 119.93 LBS | TEMPERATURE: 98 F | OXYGEN SATURATION: 96 %

## 2022-11-26 DIAGNOSIS — Z90.49 ACQUIRED ABSENCE OF OTHER SPECIFIED PARTS OF DIGESTIVE TRACT: Chronic | ICD-10-CM

## 2022-11-26 DIAGNOSIS — Z98.890 OTHER SPECIFIED POSTPROCEDURAL STATES: Chronic | ICD-10-CM

## 2022-11-26 PROCEDURE — 93010 ELECTROCARDIOGRAM REPORT: CPT

## 2022-11-26 PROCEDURE — 99285 EMERGENCY DEPT VISIT HI MDM: CPT

## 2022-11-27 VITALS
TEMPERATURE: 98 F | RESPIRATION RATE: 18 BRPM | DIASTOLIC BLOOD PRESSURE: 77 MMHG | HEART RATE: 75 BPM | OXYGEN SATURATION: 94 % | SYSTOLIC BLOOD PRESSURE: 116 MMHG

## 2022-11-27 LAB
ALBUMIN SERPL ELPH-MCNC: 4 G/DL — SIGNIFICANT CHANGE UP (ref 3.3–5)
ALP SERPL-CCNC: 94 U/L — SIGNIFICANT CHANGE UP (ref 40–120)
ALT FLD-CCNC: 14 U/L — SIGNIFICANT CHANGE UP (ref 10–45)
ANION GAP SERPL CALC-SCNC: 11 MMOL/L — SIGNIFICANT CHANGE UP (ref 5–17)
AST SERPL-CCNC: 14 U/L — SIGNIFICANT CHANGE UP (ref 10–40)
BASOPHILS # BLD AUTO: 0.08 K/UL — SIGNIFICANT CHANGE UP (ref 0–0.2)
BASOPHILS NFR BLD AUTO: 1.7 % — SIGNIFICANT CHANGE UP (ref 0–2)
BILIRUB SERPL-MCNC: 0.6 MG/DL — SIGNIFICANT CHANGE UP (ref 0.2–1.2)
BUN SERPL-MCNC: 18 MG/DL — SIGNIFICANT CHANGE UP (ref 7–23)
CALCIUM SERPL-MCNC: 9.3 MG/DL — SIGNIFICANT CHANGE UP (ref 8.4–10.5)
CHLORIDE SERPL-SCNC: 102 MMOL/L — SIGNIFICANT CHANGE UP (ref 96–108)
CO2 SERPL-SCNC: 26 MMOL/L — SIGNIFICANT CHANGE UP (ref 22–31)
CREAT SERPL-MCNC: 1 MG/DL — SIGNIFICANT CHANGE UP (ref 0.5–1.3)
EGFR: 55 ML/MIN/1.73M2 — LOW
EOSINOPHIL # BLD AUTO: 0.16 K/UL — SIGNIFICANT CHANGE UP (ref 0–0.5)
EOSINOPHIL NFR BLD AUTO: 3.4 % — SIGNIFICANT CHANGE UP (ref 0–6)
GLUCOSE SERPL-MCNC: 150 MG/DL — HIGH (ref 70–99)
HCT VFR BLD CALC: 40 % — SIGNIFICANT CHANGE UP (ref 34.5–45)
HGB BLD-MCNC: 12.8 G/DL — SIGNIFICANT CHANGE UP (ref 11.5–15.5)
IMM GRANULOCYTES NFR BLD AUTO: 0.6 % — SIGNIFICANT CHANGE UP (ref 0–0.9)
LYMPHOCYTES # BLD AUTO: 1.33 K/UL — SIGNIFICANT CHANGE UP (ref 1–3.3)
LYMPHOCYTES # BLD AUTO: 28.7 % — SIGNIFICANT CHANGE UP (ref 13–44)
MCHC RBC-ENTMCNC: 28.2 PG — SIGNIFICANT CHANGE UP (ref 27–34)
MCHC RBC-ENTMCNC: 32 GM/DL — SIGNIFICANT CHANGE UP (ref 32–36)
MCV RBC AUTO: 88.1 FL — SIGNIFICANT CHANGE UP (ref 80–100)
MONOCYTES # BLD AUTO: 0.63 K/UL — SIGNIFICANT CHANGE UP (ref 0–0.9)
MONOCYTES NFR BLD AUTO: 13.6 % — SIGNIFICANT CHANGE UP (ref 2–14)
NEUTROPHILS # BLD AUTO: 2.41 K/UL — SIGNIFICANT CHANGE UP (ref 1.8–7.4)
NEUTROPHILS NFR BLD AUTO: 52 % — SIGNIFICANT CHANGE UP (ref 43–77)
NRBC # BLD: 0 /100 WBCS — SIGNIFICANT CHANGE UP (ref 0–0)
PLATELET # BLD AUTO: 201 K/UL — SIGNIFICANT CHANGE UP (ref 150–400)
POTASSIUM SERPL-MCNC: 3.7 MMOL/L — SIGNIFICANT CHANGE UP (ref 3.5–5.3)
POTASSIUM SERPL-SCNC: 3.7 MMOL/L — SIGNIFICANT CHANGE UP (ref 3.5–5.3)
PROT SERPL-MCNC: 7.2 G/DL — SIGNIFICANT CHANGE UP (ref 6–8.3)
RBC # BLD: 4.54 M/UL — SIGNIFICANT CHANGE UP (ref 3.8–5.2)
RBC # FLD: 13.4 % — SIGNIFICANT CHANGE UP (ref 10.3–14.5)
SODIUM SERPL-SCNC: 139 MMOL/L — SIGNIFICANT CHANGE UP (ref 135–145)
WBC # BLD: 4.64 K/UL — SIGNIFICANT CHANGE UP (ref 3.8–10.5)
WBC # FLD AUTO: 4.64 K/UL — SIGNIFICANT CHANGE UP (ref 3.8–10.5)

## 2022-11-27 PROCEDURE — 71045 X-RAY EXAM CHEST 1 VIEW: CPT

## 2022-11-27 PROCEDURE — 85025 COMPLETE CBC W/AUTO DIFF WBC: CPT

## 2022-11-27 PROCEDURE — 70450 CT HEAD/BRAIN W/O DYE: CPT | Mod: MA

## 2022-11-27 PROCEDURE — 71045 X-RAY EXAM CHEST 1 VIEW: CPT | Mod: 26

## 2022-11-27 PROCEDURE — 99285 EMERGENCY DEPT VISIT HI MDM: CPT | Mod: 25

## 2022-11-27 PROCEDURE — 70450 CT HEAD/BRAIN W/O DYE: CPT | Mod: 26,MA

## 2022-11-27 PROCEDURE — 80053 COMPREHEN METABOLIC PANEL: CPT

## 2022-11-27 PROCEDURE — 36415 COLL VENOUS BLD VENIPUNCTURE: CPT

## 2022-11-27 PROCEDURE — 93005 ELECTROCARDIOGRAM TRACING: CPT

## 2022-11-27 NOTE — ED PROVIDER NOTE - PATIENT PORTAL LINK FT
You can access the FollowMyHealth Patient Portal offered by [facility] by registering at the following website: [email or web address]. By joining [employer]’s FollowMyHealth portal, you will also be able to view your health information using other applications (apps) compatible with our system.

## 2022-11-27 NOTE — ED PROVIDER NOTE - NS ED ROS FT
General: denies fever, chills  HENT: denies nasal congestion, rhinorrhea  Eyes: denies visual changes, blurred vision  CV: denies chest pain, palpitations  Resp: denies difficulty breathing, cough  Abdominal: denies nausea, vomiting, diarrhea, abdominal pain  : denies urinary pain or discharge  MSK: left sided arm/leg pain  Neuro: denies headaches, numbness, tingling  Skin: denies rashes, bruises

## 2022-11-27 NOTE — ED PROVIDER NOTE - CLINICAL SUMMARY MEDICAL DECISION MAKING FREE TEXT BOX
: 88yo F w/ history of GERD, HTN, Chronic back pain, found to be bulging discs on MRI, w/ gallstone pancreatitis with CBD stone in 2019, s/p ERCP and removal of 1.2cm stone March 2019 complicated by pneumobilia coming in w/ acute onset left sided arm and leg pain that awoke patient up from sleep; symptoms consistent with radicular pain; low concern for ICH, carotid dissection or aortic dissection; will evaluate w/ labs for any electrolyte abnormalities, do screening CT, chest xray and EKG

## 2022-11-27 NOTE — ED ADULT NURSE NOTE - OBJECTIVE STATEMENT
86 y/o female coming to the ER with c/o left sided pain. A&Ox4. Ambulatory. Pashto speaking, patient's daughter requesting to translate. PMH HTN, and HLD, PE no longer on anticoagulation. Patient states around 2100 on 11/26/2022 she was woken up by "severe left sided arm and leg pain" Patient states it lasted for about 15 minutes and felt like a "cramping" feeling. Patient states she has had cramping in her legs previously, but never to this extent. Patient's daughter also endorses while in the waiting room the patient began to have SOB as well as chest pain that self resolved, patient's daughter states, "it may have been a panic attack." Patient moving all extremities with equal strength and sensation. Speech is clear. Patient denies chest pain, fever, chills, n/v/d, urinary symptoms, abdominal pain. Safety measures maintained. Bed in the lowest position.  Provider at the bedside. No acute distress noted or further complaints at this time.

## 2022-11-27 NOTE — ED PROVIDER NOTE - PHYSICAL EXAMINATION
GENERAL: well appearing in no acute distress, non-toxic appearing  HEAD: normocephalic, atraumatic  HENT: airway intact  EYES: normal conjunctiva, EOMI, PERRL  CARDIAC: regular rate and rhythm, normal S1S2, no appreciable murmurs, 2+ pulses in UE/LE b/l  PULM: normal breath sounds, clear to ascultation bilaterally, no rales, rhonchi, wheezing  GI: abdomen nondistended, soft, nontender, no guarding, rebound tenderness  NEURO: no focal motor or sensory deficits, CN2-12 intact, normal speech, normal gait, AAOx3  MSK: mild TTP over left trapezius; 5/5 strength b/l UE/LE; neurovascularly intact  SKIN: well-perfused, extremities warm, no visible rashes

## 2022-11-27 NOTE — ED PROVIDER NOTE - PROGRESS NOTE DETAILS
[place]averick, PGY3: patient reassessed and noting persistent improvement in symptoms. Workup largely unactionable. Will d/c with outpatient PMD follow up. Strict return precautions provided and patient understands and agrees w/ plan.

## 2022-11-27 NOTE — ED PROVIDER NOTE - NSFOLLOWUPINSTRUCTIONS_ED_ALL_ED_FT
Discharge instructions:    - Please follow up with your Primary Care Doctor within the next 24-72 hours.     - Tylenol up to 650 mg every 4-6 hours as needed for pain. Take any prescribed medications as instructed:       SEEK IMMEDIATE MEDICAL CARE IF YOU HAVE ANY OF THE FOLLOWING SYMPTOMS: bowel or bladder control problems, unusual weakness or numbness in your arms or legs, nausea or vomiting, abdominal pain, fever, dizziness/lightheadedness.

## 2022-11-27 NOTE — ED ADULT NURSE REASSESSMENT NOTE - NS ED NURSE REASSESS COMMENT FT1
Patient d/c. Reviewed d/c paperwork with patient and patient's family, all questions answered at this time. Patient verbalizes understanding. IV removed. Patient instructed to return to the ER for any worsening s/s including chest pain, SOB, fever, n/v/d. Patient alert and stable at time of d/c.

## 2022-11-27 NOTE — ED PROVIDER NOTE - OBJECTIVE STATEMENT
88yo F w/ history of GERD, HTN, Chronic back pain, found to be bulging discs on MRI, w/ gallstone pancreatitis with CBD stone in 2019, s/p ERCP and removal of 1.2cm stone March 2019 complicated by pneumobilia coming in w/ acute onset left sided arm and leg pain that awoke patient up from sleep. [place] pain radiating down from shoulder to arm + left lower back radiating down leg. Began breathing rapidly when she called out for her daughters and had trouble speaking. Patient notes improvement in all symptoms and is able to ambulate w/o difficulty.

## 2022-11-27 NOTE — ED PROVIDER NOTE - ATTENDING CONTRIBUTION TO CARE
Attending note (Panchito): 88yo F w/ h/o GERD, HTN, Chronic back pain, found to be bulging discs on MRI (cervical and lumbar?), and h/o gallstone pancreatitis with CBD stone in 2019, s/p ERCP who presents in w/ acute onset left sided arm and leg pain that awoke patient up from sleep; now resolved; no weakness/numbness at time or now. No falls/trauma. No redness/rashes.  On exam, is well appearing, ECG NSR.  no focal neurologic deficits, normal cardiac and lung sounds to auscultation. transient episode of left sided extremity pain but no CP/SOB/Abdominal pain. Not consistent with acs, pe, acute aortic pathology, acute surgical intraabdominal pathology; screening labs and CT head reviewed with resident; given no acute actionable findings and resolution of symptoms; patient is stable for dc.

## 2023-01-03 NOTE — ED PROVIDER NOTE - NSICDXPASTSURGICALHX_GEN_ALL_CORE_FT
PAST SURGICAL HISTORY:  S/P appendectomy     
89M coming form home, ambulates with RW, and limited HHA w/ PMHx BPH, HLD, GERD, CAD s/p CABG, CVA, chronic Afib (on ELIquis), and HTN brought in by HHA for AMS and SOB with fall yesterday. Per the HHA, patient was trying to help his wife up after she fell and he fell on top of her, injuring his left arm, since then he has been not himself. HHA notes that he walks and talks, responsible for taking his own medications, however unclear if has been taking all of his medications in the evening as patient does not have HHA at night. Upon EMS arrival found to be hypoxic and placed on NRBM with elevated BP. Patient is AOx2-3 with significant hearing impairment with hearing aid in the left ear, but able to make his own decisions. Per the son, Williams Prado (HCP) the visiting NP has noted increasing BP but with no intervention. In the ED, patient decided to continue care with DNR/DNI, family is in agreement. ROS limited d/t hearing impairment but denies HA, chest pain, and abdominal pain.

## 2023-03-06 NOTE — DISCHARGE NOTE ADULT - PATIENT PORTAL LINK FT
Yes
You can access the [facility] Patient Portal, offered by [facility], by registering with the following website: [email or web address]

## 2023-04-02 NOTE — ED PROVIDER NOTE - CPE EDP ENMT NORM
[Clear Rhinorrhea] : clear rhinorrhea [Mucoid Discharge] : mucoid discharge [Erythematous Oropharynx] : erythematous oropharynx [Enlarged Tonsils] : enlarged tonsils [NL] : warm, clear normal...

## 2023-04-25 NOTE — PATIENT PROFILE ADULT - HAS THE PATIENT EXPERIENCED ANY OF THE FOLLOWING WITHIN THE WEEK PRIOR TO ADMISSION?
Upon review of the In Basket request and the patient's chart, initial outreach has been made via fax to facility  Please see Contacts section for details       Thank you  Armin Maharaj MA no

## 2023-07-18 NOTE — ED ADULT TRIAGE NOTE - BANDS:
Fall Risk; Rinvoq Counseling: I discussed with the patient the risks of Rinvoq therapy including but not limited to upper respiratory tract infections, shingles, cold sores, bronchitis, nausea, cough, fever, acne, and headache. Live vaccines should be avoided.  This medication has been linked to serious infections; higher rate of mortality; malignancy and lymphoproliferative disorders; major adverse cardiovascular events; thrombosis; thrombocytopenia, anemia, and neutropenia; lipid elevations; liver enzyme elevations; and gastrointestinal perforations.

## 2024-01-05 NOTE — PATIENT PROFILE ADULT - NSASFUNCLEVELADLTOILET_GEN_A_NUR
----- Message from MUKESH Sanchez sent at 1/4/2024  1:57 PM EST -----  Pt is due for f/u. Please schedule asap with labs done prior. Not seen since 12/2022     2 = assistive person

## 2024-02-09 NOTE — PROGRESS NOTE ADULT - PROBLEM/PLAN-4
"Assessment/Plan:       Diagnoses and all orders for this visit:    Acute non-recurrent maxillary sinusitis  -     azithromycin (ZITHROMAX) 250 mg tablet; Take 2 tablets today then 1 tablet daily x 4 days            Subjective:        Patient ID: Mariia Parker is a 45 y.o. female.      Patient here with cough and sore throat over the past 8 days.  Patient took COVID test x 2 which were negative.  Patient with some nasal congestion.  Symptoms worse at night.  No GERD symptoms.  No vomiting or diarrhea associate with this.  No body aches.  Mild fatigue.  No fever noted.  Patient to use DayQuil.  Some NyQuil being used    Sore Throat   Associated symptoms include congestion and coughing.   Cough  Associated symptoms include postnasal drip and a sore throat. Pertinent negatives include no fever.         The following portions of the patient's history were reviewed and updated as appropriate: allergies, current medications, past family history, past medical history, past social history, past surgical history and problem list.      Review of Systems   Constitutional: Negative.  Negative for fever.   HENT:  Positive for congestion, postnasal drip and sore throat.    Eyes: Negative.    Respiratory:  Positive for cough.    Cardiovascular: Negative.    Gastrointestinal: Negative.    Endocrine: Negative.    Genitourinary: Negative.    Musculoskeletal: Negative.    Skin: Negative.    Allergic/Immunologic: Negative.    Neurological: Negative.    Hematological: Negative.    Psychiatric/Behavioral: Negative.             Objective:        Depression Screening and Follow-up Plan: Clincally patient does not have depression. No treatment is required.             /72 (BP Location: Left arm, Patient Position: Sitting, Cuff Size: Standard)   Pulse 86   Temp 99.3 °F (37.4 °C) (Temporal)   Ht 5' 7\" (1.702 m)   Wt 67.1 kg (148 lb)   SpO2 98%   BMI 23.18 kg/m²          Physical Exam  Vitals and nursing note reviewed. "
  Constitutional:       General: She is not in acute distress.     Appearance: Normal appearance. She is not ill-appearing, toxic-appearing or diaphoretic.   HENT:      Head: Normocephalic and atraumatic.      Right Ear: Tympanic membrane, ear canal and external ear normal. There is no impacted cerumen.      Left Ear: Tympanic membrane, ear canal and external ear normal. There is no impacted cerumen.      Nose: Nose normal. No congestion or rhinorrhea.      Mouth/Throat:      Mouth: Mucous membranes are moist.      Pharynx: Oropharyngeal exudate present. No posterior oropharyngeal erythema.   Eyes:      General: No scleral icterus.        Right eye: No discharge.         Left eye: No discharge.   Neck:      Vascular: No carotid bruit.   Cardiovascular:      Rate and Rhythm: Normal rate and regular rhythm.      Pulses: Normal pulses.      Heart sounds: Normal heart sounds. No murmur heard.     No friction rub. No gallop.   Pulmonary:      Effort: Pulmonary effort is normal. No respiratory distress.      Breath sounds: Normal breath sounds. No stridor. No wheezing, rhonchi or rales.   Chest:      Chest wall: No tenderness.   Musculoskeletal:         General: No swelling, tenderness, deformity or signs of injury. Normal range of motion.      Cervical back: Normal range of motion and neck supple. No rigidity. No muscular tenderness.      Right lower leg: No edema.      Left lower leg: No edema.   Lymphadenopathy:      Cervical: No cervical adenopathy.   Skin:     General: Skin is warm and dry.      Capillary Refill: Capillary refill takes less than 2 seconds.      Coloration: Skin is not jaundiced.      Findings: No bruising, erythema, lesion or rash.   Neurological:      Mental Status: She is alert and oriented to person, place, and time. Mental status is at baseline.      Cranial Nerves: No cranial nerve deficit.      Sensory: No sensory deficit.      Motor: No weakness.      Coordination: Coordination normal.      
DISPLAY PLAN FREE TEXT
Gait: Gait normal.   Psychiatric:         Mood and Affect: Mood normal.         Behavior: Behavior normal.         Thought Content: Thought content normal.         Judgment: Judgment normal.         
DISPLAY PLAN FREE TEXT

## 2024-03-27 ENCOUNTER — INPATIENT (INPATIENT)
Facility: HOSPITAL | Age: 89
LOS: 1 days | Discharge: HOME | DRG: 690 | End: 2024-03-29
Attending: STUDENT IN AN ORGANIZED HEALTH CARE EDUCATION/TRAINING PROGRAM | Admitting: STUDENT IN AN ORGANIZED HEALTH CARE EDUCATION/TRAINING PROGRAM
Payer: MEDICARE

## 2024-03-27 VITALS
TEMPERATURE: 98 F | SYSTOLIC BLOOD PRESSURE: 122 MMHG | WEIGHT: 139.99 LBS | HEART RATE: 68 BPM | DIASTOLIC BLOOD PRESSURE: 60 MMHG | RESPIRATION RATE: 20 BRPM | OXYGEN SATURATION: 95 % | HEIGHT: 62 IN

## 2024-03-27 DIAGNOSIS — Z90.49 ACQUIRED ABSENCE OF OTHER SPECIFIED PARTS OF DIGESTIVE TRACT: Chronic | ICD-10-CM

## 2024-03-27 DIAGNOSIS — Z98.890 OTHER SPECIFIED POSTPROCEDURAL STATES: Chronic | ICD-10-CM

## 2024-03-27 DIAGNOSIS — R55 SYNCOPE AND COLLAPSE: ICD-10-CM

## 2024-03-27 LAB
ALBUMIN SERPL ELPH-MCNC: 3.1 G/DL — LOW (ref 3.5–5)
ALP SERPL-CCNC: 69 U/L — SIGNIFICANT CHANGE UP (ref 40–120)
ALT FLD-CCNC: 16 U/L DA — SIGNIFICANT CHANGE UP (ref 10–60)
ANION GAP SERPL CALC-SCNC: 5 MMOL/L — SIGNIFICANT CHANGE UP (ref 5–17)
APPEARANCE UR: CLEAR — SIGNIFICANT CHANGE UP
AST SERPL-CCNC: 17 U/L — SIGNIFICANT CHANGE UP (ref 10–40)
BACTERIA # UR AUTO: ABNORMAL /HPF
BASOPHILS # BLD AUTO: 0.03 K/UL — SIGNIFICANT CHANGE UP (ref 0–0.2)
BASOPHILS NFR BLD AUTO: 0.5 % — SIGNIFICANT CHANGE UP (ref 0–2)
BILIRUB SERPL-MCNC: 0.5 MG/DL — SIGNIFICANT CHANGE UP (ref 0.2–1.2)
BILIRUB UR-MCNC: NEGATIVE — SIGNIFICANT CHANGE UP
BUN SERPL-MCNC: 22 MG/DL — HIGH (ref 7–18)
CALCIUM SERPL-MCNC: 8.3 MG/DL — LOW (ref 8.4–10.5)
CHLORIDE SERPL-SCNC: 109 MMOL/L — HIGH (ref 96–108)
CO2 SERPL-SCNC: 28 MMOL/L — SIGNIFICANT CHANGE UP (ref 22–31)
COLOR SPEC: YELLOW — SIGNIFICANT CHANGE UP
CREAT SERPL-MCNC: 0.73 MG/DL — SIGNIFICANT CHANGE UP (ref 0.5–1.3)
DIFF PNL FLD: NEGATIVE — SIGNIFICANT CHANGE UP
EGFR: 80 ML/MIN/1.73M2 — SIGNIFICANT CHANGE UP
EOSINOPHIL # BLD AUTO: 0.11 K/UL — SIGNIFICANT CHANGE UP (ref 0–0.5)
EOSINOPHIL NFR BLD AUTO: 1.9 % — SIGNIFICANT CHANGE UP (ref 0–6)
EPI CELLS # UR: PRESENT
GLUCOSE SERPL-MCNC: 128 MG/DL — HIGH (ref 70–99)
GLUCOSE UR QL: NEGATIVE MG/DL — SIGNIFICANT CHANGE UP
HCT VFR BLD CALC: 32.7 % — LOW (ref 34.5–45)
HGB BLD-MCNC: 10.8 G/DL — LOW (ref 11.5–15.5)
HIV 1 & 2 AB SERPL IA.RAPID: SIGNIFICANT CHANGE UP
HYALINE CASTS # UR AUTO: PRESENT
IMM GRANULOCYTES NFR BLD AUTO: 0.3 % — SIGNIFICANT CHANGE UP (ref 0–0.9)
INR BLD: 1.12 RATIO — SIGNIFICANT CHANGE UP (ref 0.85–1.18)
KETONES UR-MCNC: NEGATIVE MG/DL — SIGNIFICANT CHANGE UP
LEUKOCYTE ESTERASE UR-ACNC: ABNORMAL
LIDOCAIN IGE QN: 45 U/L — SIGNIFICANT CHANGE UP (ref 13–75)
LYMPHOCYTES # BLD AUTO: 0.96 K/UL — LOW (ref 1–3.3)
LYMPHOCYTES # BLD AUTO: 16.8 % — SIGNIFICANT CHANGE UP (ref 13–44)
MAGNESIUM SERPL-MCNC: 1.9 MG/DL — SIGNIFICANT CHANGE UP (ref 1.6–2.6)
MCHC RBC-ENTMCNC: 28.7 PG — SIGNIFICANT CHANGE UP (ref 27–34)
MCHC RBC-ENTMCNC: 33 GM/DL — SIGNIFICANT CHANGE UP (ref 32–36)
MCV RBC AUTO: 87 FL — SIGNIFICANT CHANGE UP (ref 80–100)
MONOCYTES # BLD AUTO: 0.62 K/UL — SIGNIFICANT CHANGE UP (ref 0–0.9)
MONOCYTES NFR BLD AUTO: 10.8 % — SIGNIFICANT CHANGE UP (ref 2–14)
NEUTROPHILS # BLD AUTO: 3.98 K/UL — SIGNIFICANT CHANGE UP (ref 1.8–7.4)
NEUTROPHILS NFR BLD AUTO: 69.7 % — SIGNIFICANT CHANGE UP (ref 43–77)
NITRITE UR-MCNC: NEGATIVE — SIGNIFICANT CHANGE UP
NRBC # BLD: 0 /100 WBCS — SIGNIFICANT CHANGE UP (ref 0–0)
NT-PROBNP SERPL-SCNC: 103 PG/ML — SIGNIFICANT CHANGE UP (ref 0–450)
PH UR: 5 — SIGNIFICANT CHANGE UP (ref 5–8)
PLATELET # BLD AUTO: 151 K/UL — SIGNIFICANT CHANGE UP (ref 150–400)
POTASSIUM SERPL-MCNC: 3.5 MMOL/L — SIGNIFICANT CHANGE UP (ref 3.5–5.3)
POTASSIUM SERPL-SCNC: 3.5 MMOL/L — SIGNIFICANT CHANGE UP (ref 3.5–5.3)
PROT SERPL-MCNC: 6 G/DL — SIGNIFICANT CHANGE UP (ref 6–8.3)
PROT UR-MCNC: NEGATIVE MG/DL — SIGNIFICANT CHANGE UP
PROTHROM AB SERPL-ACNC: 12.7 SEC — SIGNIFICANT CHANGE UP (ref 9.5–13)
RBC # BLD: 3.76 M/UL — LOW (ref 3.8–5.2)
RBC # FLD: 13.8 % — SIGNIFICANT CHANGE UP (ref 10.3–14.5)
RBC CASTS # UR COMP ASSIST: 3 /HPF — SIGNIFICANT CHANGE UP (ref 0–4)
SODIUM SERPL-SCNC: 142 MMOL/L — SIGNIFICANT CHANGE UP (ref 135–145)
SP GR SPEC: 1.01 — SIGNIFICANT CHANGE UP (ref 1–1.03)
TROPONIN I, HIGH SENSITIVITY RESULT: 6.5 NG/L — SIGNIFICANT CHANGE UP
UROBILINOGEN FLD QL: 0.2 MG/DL — SIGNIFICANT CHANGE UP (ref 0.2–1)
WBC # BLD: 5.72 K/UL — SIGNIFICANT CHANGE UP (ref 3.8–10.5)
WBC # FLD AUTO: 5.72 K/UL — SIGNIFICANT CHANGE UP (ref 3.8–10.5)
WBC UR QL: 28 /HPF — HIGH (ref 0–5)

## 2024-03-27 PROCEDURE — 99222 1ST HOSP IP/OBS MODERATE 55: CPT

## 2024-03-27 PROCEDURE — 74174 CTA ABD&PLVS W/CONTRAST: CPT | Mod: 26,MC

## 2024-03-27 PROCEDURE — 71045 X-RAY EXAM CHEST 1 VIEW: CPT | Mod: 26

## 2024-03-27 PROCEDURE — 71275 CT ANGIOGRAPHY CHEST: CPT | Mod: 26,MC

## 2024-03-27 PROCEDURE — 99285 EMERGENCY DEPT VISIT HI MDM: CPT

## 2024-03-27 RX ORDER — OLMESARTAN MEDOXOMIL 5 MG/1
1 TABLET, FILM COATED ORAL
Refills: 0 | DISCHARGE

## 2024-03-27 RX ORDER — CEFTRIAXONE 500 MG/1
1000 INJECTION, POWDER, FOR SOLUTION INTRAMUSCULAR; INTRAVENOUS ONCE
Refills: 0 | Status: COMPLETED | OUTPATIENT
Start: 2024-03-27 | End: 2024-03-27

## 2024-03-27 RX ORDER — SODIUM CHLORIDE 9 MG/ML
1000 INJECTION INTRAMUSCULAR; INTRAVENOUS; SUBCUTANEOUS ONCE
Refills: 0 | Status: COMPLETED | OUTPATIENT
Start: 2024-03-27 | End: 2024-03-27

## 2024-03-27 RX ORDER — ACETAMINOPHEN 500 MG
650 TABLET ORAL EVERY 6 HOURS
Refills: 0 | Status: DISCONTINUED | OUTPATIENT
Start: 2024-03-27 | End: 2024-03-29

## 2024-03-27 RX ORDER — LOSARTAN POTASSIUM 100 MG/1
50 TABLET, FILM COATED ORAL DAILY
Refills: 0 | Status: DISCONTINUED | OUTPATIENT
Start: 2024-03-27 | End: 2024-03-29

## 2024-03-27 RX ORDER — ENOXAPARIN SODIUM 100 MG/ML
40 INJECTION SUBCUTANEOUS EVERY 24 HOURS
Refills: 0 | Status: DISCONTINUED | OUTPATIENT
Start: 2024-03-27 | End: 2024-03-29

## 2024-03-27 RX ORDER — ATORVASTATIN CALCIUM 80 MG/1
40 TABLET, FILM COATED ORAL AT BEDTIME
Refills: 0 | Status: DISCONTINUED | OUTPATIENT
Start: 2024-03-27 | End: 2024-03-29

## 2024-03-27 RX ORDER — MONTELUKAST 4 MG/1
1 TABLET, CHEWABLE ORAL
Qty: 0 | Refills: 0 | DISCHARGE

## 2024-03-27 RX ORDER — ACETAMINOPHEN 500 MG
1000 TABLET ORAL ONCE
Refills: 0 | Status: COMPLETED | OUTPATIENT
Start: 2024-03-27 | End: 2024-03-27

## 2024-03-27 RX ADMIN — SODIUM CHLORIDE 1000 MILLILITER(S): 9 INJECTION INTRAMUSCULAR; INTRAVENOUS; SUBCUTANEOUS at 18:13

## 2024-03-27 RX ADMIN — Medication 400 MILLIGRAM(S): at 18:13

## 2024-03-27 RX ADMIN — CEFTRIAXONE 100 MILLIGRAM(S): 500 INJECTION, POWDER, FOR SOLUTION INTRAMUSCULAR; INTRAVENOUS at 20:01

## 2024-03-27 RX ADMIN — Medication 1000 MILLIGRAM(S): at 18:38

## 2024-03-27 RX ADMIN — SODIUM CHLORIDE 1000 MILLILITER(S): 9 INJECTION INTRAMUSCULAR; INTRAVENOUS; SUBCUTANEOUS at 16:36

## 2024-03-27 NOTE — H&P ADULT - HISTORY OF PRESENT ILLNESS
Patient is an 88 year old female from home, ambulates XXX, with PMH of HTN, GERD, Chronic back pain and prior PE (No longer on AC) who presented to the ED after passing out    Patient denies nausea, vomiting, headache, blurry vision, dizziness, chest pain, abdominal pain, constipation, diarrhea, leg swelling.  Patient is an 88 year old female from home, with PMH of HTN, HLD, GERD, Chronic back pain and prior PE (No longer on AC) who presented to the ED after passing out.  Patients family at bedside assisting with history.  Patient states at home she passed out for 2-3 minutes in front of another family member.  Patient states she did not have shaking or tongue bitting to her knowledge.  Patient was apparently not confused however did come out of it screaming of abdominal pain.  Patient had earlier in the day gone to her doctor for routine follow up and was started on Tizanidine 2mg and took her first dose prior to this episode.  Patient also noted low BP after this episode.  Patient states she has not had episodes of this in the past.  Patient complains of urinary frequency and abdominal pain.  Patient denies nausea, vomiting, headache, blurry vision, dizziness, chest pain, constipation, diarrhea, leg swelling.

## 2024-03-27 NOTE — H&P ADULT - ASSESSMENT
Patient is an 88 year old female from home, with PMH of HTN, HLD, GERD, Chronic back pain and prior PE (No longer on AC) who presented to the ED after passing out.  In ED patient hemodynamically stable and afebrile.  Patients labs reveal UTI with Pyuria and Trace LE.  Patients CTA Chest / Abdomen / Pelvis negative for acute pathology.  Patient is being admitted to tele for Syncope and UTI.

## 2024-03-27 NOTE — H&P ADULT - ATTENDING COMMENTS
Vital Signs Last 24 Hrs  T(C): 36.6 (28 Mar 2024 02:19), Max: 37 (27 Mar 2024 19:33)  T(F): 97.8 (28 Mar 2024 02:19), Max: 98.6 (27 Mar 2024 19:33)  HR: 61 (28 Mar 2024 02:19) (61 - 73)  BP: 130/55 (28 Mar 2024 02:19) (114/59 - 134/62)  BP(mean): 78 (28 Mar 2024 02:19) (78 - 78)  RR: 18 (28 Mar 2024 02:19) (18 - 20)  SpO2: 94% (28 Mar 2024 02:19) (94% - 97%)  Parameters below as of 28 Mar 2024 02:19  Patient On (Oxygen Delivery Method): room air    Labs reviewed  BUN 22 Cr 0.73    UA - wbc 28    CXR /CTA chest /abdomen reviewed  - unremarkable     Impression   88 year old woman with hx of HTN, HLD admitted for syncope at home that appears orthostatic; BP noted low by EMS and received IVF on the field and in the ED before orthostatic vital signs were done. BP are back to baseline now.  She is admitted for syncope work up to r/o any cardiac etiology which is less likely.    Problems   # Syncope   Likely orthostatic   # UTI   # LUCILLE     Plan   Admit to telemetry   Serial trop and EKG unremarkable   ECHO in AM   Cardiology consult   Empiric antibiotics - IV ceftriaxone 1g daily for UTI   Gentle hydration and recheck chemistry in AM   MEd reconciliation and resume home meds  D/C planning

## 2024-03-27 NOTE — ED ADULT NURSE NOTE - TEMPLATE LIST FOR HEAD TO TOE ASSESSMENT
Nerve Block    Date/Time: 5/5/2023 9:39 AM    Performed by: Messi Wu CRNA  Authorized by: Messi Wu CRNA    Block Type:interscalene  Laterality:  Left  Indication: post-op pain management at surgeon's request and at surgeon's request    Surgeon:  Devon  Preanesthetic Checklist Patient Identified (2 criteria), Block Plan Confirmed, Resuscitation Equipment Available, Supplemental O2 (if needed), Allergies Confirmed, Block Site Marked (if applicable), Monitors Applied, Aseptic Technique, Coagulation Status, Necessary Block Equipment Present, Timeout Performed, IV Access Functioning, Consent Verified, Drugs/Solutions Labeled and Sedation Given (if needed)    Patient Position:  Supine  Prep:  Chlorhexidine gluconate (CHG)  Max Sterile Barrier Technique:  Hand washing, cap/mask and sterile gloves  Monitoring:  Continuous pulse oximetry, blood pressure and EKG  Injection Technique:  Single-shot  Needle Type:  Echogenic  Needle Gauge:  25 G  Needle Length:  8 cm  Needle Localization:  Anatomical landmarks, nerve stimulator and ultrasound guidance   in-plane  Physical status during block:  Awake  Medications Administered  fentaNYL (SUBLIMAZE) 50 mcg/mL - Intravenous   100 mcg - 5/5/2023 9:39:00 AM  MIDazolam (VERSED) 1 mg/mL - Intravenous   2 mg - 5/5/2023 9:39:00 AM  ropivacaine (NAROPIN) 0.5 % - Infiltration   20 mL - 5/5/2023 9:39:00 AM  bupivacaine liposome (EXPAREL) 1.3 % - Infiltration   10 mL - 5/5/2023 9:39:00 AM  Injection Assessment:  Negative aspiration for heme, no resistance to injection and no paresthesia on injection  Patient Condition:  Tolerated well, no immediate complications  Paresthesia Pain:  None  Heart Rate Change: No    Slowly Injected: Yes    Start Time:5/5/2023 9:39 AM  Stop Time: 5/5/2023 9:40 AM       Respiratory

## 2024-03-27 NOTE — ED PROVIDER NOTE - OBJECTIVE STATEMENT
88 female with hx of HTN, HLD, GERD, chronic back pain, & prior PE.   Pt presenting to the ED reporting diffuse abdominal pain followed by episode of syncope lasting ~2 minutes.  No trauma.  Patient had been reporting back pain earlier in the day and took tizanidine 2 mg <15 minutes prior.  No similar prior episodes.

## 2024-03-27 NOTE — H&P ADULT - PROBLEM SELECTOR PLAN 4
- Patient with history of HLD  - Patient on Rosuvastatin at home  - Continue home antihyperlipidemic

## 2024-03-27 NOTE — ED PROVIDER NOTE - PHYSICAL EXAMINATION
Gen:  Awake, alert, NAD, WDWN, NCAT, non-toxic appearing. No skull/facial tender, no step-offs, no raccoon/madrid.  Eyes:  PERRL, EOMI, no icterus, normal lids/lashes, normal conjunctivae.  ENT:  External inspection normal, pink/moist membranes.   CV:  S1S2, regular rate and rhythm, no murmur/gallops/rubs, no JVD, 2+ pulses b/l, no edema/cords/homans, warm/well-perfused.  Resp:  Normal respiratory rate/effort, no respiratory distress, normal voice, speaking full sentences, lungs clear to auscultation b/l, no wheezing/rales/rhonchi, no retractions, no stridor.  Abd:  Soft abdomen, no tender/distended/guarding/rebound, no pulsatile mass, no CVA tender.   Musculoskeletal:  N/V intact, FROM all 4 extremities, normal motor tone.  Neck:  FROM neck, supple, trachea midline, no meningismus.   Skin:  Color normal for race, warm and dry, no rash.  Neuro:  Oriented x person/place, CN 2-12 intact (grossly), normal motor (grossly), normal sensory (grossly), GCS 15  Psych:  Attention normal. Affect normal. Behavior normal.

## 2024-03-27 NOTE — H&P ADULT - PROBLEM SELECTOR PLAN 2
- Presented with urinary frequency and abdominal pain  - UA Positive with WBC 25 and LE  - IV Fluids  - F/U Urine cultures   - IV Ceftriaxone qd for 3 days for uncomplicated UTI

## 2024-03-27 NOTE — ED PROVIDER NOTE - CLINICAL SUMMARY MEDICAL DECISION MAKING FREE TEXT BOX
88 female with hx of HTN, HLD, GERD, chronic back pain, & prior PE.   Pt presenting to the ED reporting diffuse abdominal pain followed by episode of syncope lasting ~2 minutes.  No trauma.  Patient had been reporting back pain earlier in the day and took tizanidine 2 mg <15 minutes prior.  No similar prior episodes.    Vitals stable.  Nontoxic appearing, n/v intact.  Airway intact, no respiratory distress, no hypoxia.  No abdominal or CVA tenderness.  Non-focal neuro.    Plan to obtain:    -Labs, EKG, CXR, CT R/O aortic dissection or ruptured aortic aneurysm, analgesia/antiemetics as needed, IV fluids, observe/reassess.  No evidence of sedative hypnotic toxicity during evaluation.    Lab values demonstrate no acute/emergent pathology.  Troponin negative.  BNP normal.  My independent interpretation of the EKG: Sinus @63, normal axis, normal intervals, normal ST/T  My independent interpretation of XR: Increased interstitial markings B/L, no effusion or pneumothorax.    CT pending at time of sign-out    : [id number]

## 2024-03-27 NOTE — ED ADULT NURSE NOTE - CHIEF COMPLAINT QUOTE
S/p syncopal episode at home around 1 hour ago. Patient reports she took her chronic back pain muscle relaxer around 12 noon, and shortly after felt shortness of breath, dizziness and passed out. En route, 20G Right AC IV placed, 2L NC O2, and 600ml fluid bolus given by EMS.

## 2024-03-27 NOTE — H&P ADULT - NSHPPHYSICALEXAM_GEN_ALL_CORE
T(C): 36.3 (03-27-24 @ 23:05), Max: 37 (03-27-24 @ 19:33)  T(F): 97.3 (03-27-24 @ 23:05), Max: 98.6 (03-27-24 @ 19:33)  HR: 63 (03-27-24 @ 23:05) (63 - 73)  BP: 114/59 (03-27-24 @ 23:05) (114/59 - 134/62)  RR: 18 (03-27-24 @ 23:05) (18 - 20)  SpO2: 94% (03-27-24 @ 23:05) (94% - 97%)    GENERAL: NAD; lying in bed  HEAD:  Atraumatic, Normocephalic  EYES: EOMI, PERRLA, conjunctiva and sclera clear  ENMT: No tonsillar erythema, exudates, or enlargement  NECK: Supple, normal appearance  NERVOUS SYSTEM:  Alert & Oriented X3,  Motor Strength 5/5 B/L upper and lower extremities, sensation intact  CHEST/LUNG: Lungs clear to auscultation bilaterally, No rales, rhonchi, wheezing   HEART: Regular rate and rhythm; No murmurs, rubs, or gallops  ABDOMEN: Soft, + mild supra pubic tenderness, Nondistended; Bowel sounds present  EXTREMITIES:  2+ Peripheral Pulses, No clubbing, cyanosis, or edema  SKIN: No rashes or lesions;

## 2024-03-27 NOTE — H&P ADULT - NSHPREVIEWOFSYSTEMS_GEN_ALL_CORE
CONSTITUTIONAL: No fever, weight loss, or fatigue  EYES: No eye pain, visual disturbances, or discharge  ENT:  No difficulty hearing, tinnitus, vertigo; No sinus or throat pain  NECK: No pain or stiffness  RESPIRATORY: No cough, wheezing, chills or hemoptysis; No Shortness of Breath  CARDIOVASCULAR: No chest pain, palpitations, passing out, dizziness, or leg swelling  GASTROINTESTINAL: + abdominal pain. No nausea, vomiting, or hematemesis; No diarrhea or constipation. No melena or hematochezia.  GENITOURINARY: No dysuria, + frequency, No hematuria, or incontinence  NEUROLOGICAL: No headaches, memory loss, loss of strength, numbness, or tremors  SKIN: No itching, burning, rashes, or lesions

## 2024-03-27 NOTE — ED PROVIDER NOTE - NS ED ROS FT
Constitutional: (-) fever (-) chills  HENT: (-) congestion (-) rhinorrhea (-) sore throat  Eyes: (-) pain (-) redness  Respiratory: (-) cough (-) shortness of breath (-) wheezing (-) stridor    Cardiovascular: (-) chest pain (-) palpitations (-) leg swelling (+) syncope  Gastrointestinal: (+) abdominal pain (-) blood in stool (no melena/hematochezia) (-) diarrhea (-) vomiting  Genitourinary: (-) dysuria (-) hematuria  Musculoskeletal: (-) gait problem (-) joint swelling (-) myalgias (+) back pain  Skin: (-) color change (-) rash  Neurological: (-) weakness (-) numbness (-) headaches  Psychiatric/Behavioral: (-) confusion

## 2024-03-27 NOTE — H&P ADULT - PROBLEM SELECTOR PLAN 3
- Patient with history of HTN  - Patient on Olmesartan at home  - Continue home antihypertensives with holding parameters

## 2024-03-27 NOTE — H&P ADULT - PROBLEM SELECTOR PLAN 1
- presented after episode of passing out  - hemodynamically stable and afebrile  - Admit to telemetry  - EKG showing NSR  - Trop negative  6.5  - F/u orthostatic vitals  - F/u echo  - Cardiology consult in AM

## 2024-03-28 ENCOUNTER — TRANSCRIPTION ENCOUNTER (OUTPATIENT)
Age: 89
End: 2024-03-28

## 2024-03-28 DIAGNOSIS — I10 ESSENTIAL (PRIMARY) HYPERTENSION: ICD-10-CM

## 2024-03-28 DIAGNOSIS — N39.0 URINARY TRACT INFECTION, SITE NOT SPECIFIED: ICD-10-CM

## 2024-03-28 DIAGNOSIS — E78.5 HYPERLIPIDEMIA, UNSPECIFIED: ICD-10-CM

## 2024-03-28 DIAGNOSIS — R55 SYNCOPE AND COLLAPSE: ICD-10-CM

## 2024-03-28 DIAGNOSIS — Z29.9 ENCOUNTER FOR PROPHYLACTIC MEASURES, UNSPECIFIED: ICD-10-CM

## 2024-03-28 LAB
ANION GAP SERPL CALC-SCNC: 2 MMOL/L — LOW (ref 5–17)
BUN SERPL-MCNC: 11 MG/DL — SIGNIFICANT CHANGE UP (ref 7–18)
CALCIUM SERPL-MCNC: 8.4 MG/DL — SIGNIFICANT CHANGE UP (ref 8.4–10.5)
CHLORIDE SERPL-SCNC: 113 MMOL/L — HIGH (ref 96–108)
CO2 SERPL-SCNC: 27 MMOL/L — SIGNIFICANT CHANGE UP (ref 22–31)
CREAT SERPL-MCNC: 0.63 MG/DL — SIGNIFICANT CHANGE UP (ref 0.5–1.3)
EGFR: 86 ML/MIN/1.73M2 — SIGNIFICANT CHANGE UP
GLUCOSE SERPL-MCNC: 108 MG/DL — HIGH (ref 70–99)
HCT VFR BLD CALC: 32.1 % — LOW (ref 34.5–45)
HGB BLD-MCNC: 10.7 G/DL — LOW (ref 11.5–15.5)
MAGNESIUM SERPL-MCNC: 1.9 MG/DL — SIGNIFICANT CHANGE UP (ref 1.6–2.6)
MCHC RBC-ENTMCNC: 28.3 PG — SIGNIFICANT CHANGE UP (ref 27–34)
MCHC RBC-ENTMCNC: 33.3 GM/DL — SIGNIFICANT CHANGE UP (ref 32–36)
MCV RBC AUTO: 84.9 FL — SIGNIFICANT CHANGE UP (ref 80–100)
NRBC # BLD: 0 /100 WBCS — SIGNIFICANT CHANGE UP (ref 0–0)
PHOSPHATE SERPL-MCNC: 2.9 MG/DL — SIGNIFICANT CHANGE UP (ref 2.5–4.5)
PLATELET # BLD AUTO: 150 K/UL — SIGNIFICANT CHANGE UP (ref 150–400)
POTASSIUM SERPL-MCNC: 3.5 MMOL/L — SIGNIFICANT CHANGE UP (ref 3.5–5.3)
POTASSIUM SERPL-SCNC: 3.5 MMOL/L — SIGNIFICANT CHANGE UP (ref 3.5–5.3)
RBC # BLD: 3.78 M/UL — LOW (ref 3.8–5.2)
RBC # FLD: 13.8 % — SIGNIFICANT CHANGE UP (ref 10.3–14.5)
SODIUM SERPL-SCNC: 142 MMOL/L — SIGNIFICANT CHANGE UP (ref 135–145)
WBC # BLD: 4.59 K/UL — SIGNIFICANT CHANGE UP (ref 3.8–10.5)
WBC # FLD AUTO: 4.59 K/UL — SIGNIFICANT CHANGE UP (ref 3.8–10.5)

## 2024-03-28 RX ORDER — CEFTRIAXONE 500 MG/1
1000 INJECTION, POWDER, FOR SOLUTION INTRAMUSCULAR; INTRAVENOUS EVERY 24 HOURS
Refills: 0 | Status: DISCONTINUED | OUTPATIENT
Start: 2024-03-28 | End: 2024-03-28

## 2024-03-28 RX ORDER — CEFTRIAXONE 500 MG/1
1000 INJECTION, POWDER, FOR SOLUTION INTRAMUSCULAR; INTRAVENOUS EVERY 24 HOURS
Refills: 0 | Status: COMPLETED | OUTPATIENT
Start: 2024-03-28 | End: 2024-03-29

## 2024-03-28 RX ADMIN — LOSARTAN POTASSIUM 50 MILLIGRAM(S): 100 TABLET, FILM COATED ORAL at 06:20

## 2024-03-28 RX ADMIN — CEFTRIAXONE 100 MILLIGRAM(S): 500 INJECTION, POWDER, FOR SOLUTION INTRAMUSCULAR; INTRAVENOUS at 13:27

## 2024-03-28 RX ADMIN — ENOXAPARIN SODIUM 40 MILLIGRAM(S): 100 INJECTION SUBCUTANEOUS at 06:21

## 2024-03-28 RX ADMIN — ATORVASTATIN CALCIUM 40 MILLIGRAM(S): 80 TABLET, FILM COATED ORAL at 21:53

## 2024-03-28 NOTE — DISCHARGE NOTE PROVIDER - NSDCCAREPROVSEEN_GEN_ALL_CORE_FT
Claudia, Gary Arevalo, Raffaele Trimble, Juan Pappas, Alana Bill, Madeleine Phillips, Vish Wilkins, Reji

## 2024-03-28 NOTE — PROGRESS NOTE ADULT - PROBLEM SELECTOR PLAN 2
- Presented with urinary frequency and abdominal pain  - UA Positive with WBC 25 and LE  - IV Fluids  - F/U Urine cultures   - IV Ceftriaxone qd for 3 days for uncomplicated UTI - Presented with urinary frequency and abdominal pain  - UA Positive with WBC 5.72   - IV Fluids  - F/U Urine cultures   - IV Ceftriaxone qd for 3 days for uncomplicated UTI

## 2024-03-28 NOTE — CHART NOTE - NSCHARTNOTEFT_GEN_A_CORE
Spoke with daughter Anastasia Hankins [phone] over the phone. Updated her regarding patient's current status, lab findings and treatment plan.     Collateral received from daughter. Patient was prescribed medicine Tizanidine 2mg by her ortho doctor due to her chronic back pain. Patient took the med for the first time after dinner. Within an hour of taking the medicine, patient started having severe abdominal cramping pain. Patient got up from bed to go to the bathroom due to the pain. Witnessed syncope by the patient's sister at this time.     Advised the daughter that patient should refrain from using the med and inform her Ortho doctor.     Patient's PCP is Loida Woodward NP [phone].

## 2024-03-28 NOTE — DISCHARGE NOTE PROVIDER - NSDCMRMEDTOKEN_GEN_ALL_CORE_FT
olmesartan 20 mg oral tablet: 1 tab(s) orally once a day  rosuvastatin 10 mg oral tablet: 1 tab(s) orally once a day (at bedtime)

## 2024-03-28 NOTE — PATIENT PROFILE ADULT - FALL HARM RISK - HARM RISK INTERVENTIONS

## 2024-03-28 NOTE — PROGRESS NOTE ADULT - ATTENDING COMMENTS
Patient was seen and examined with daughter and sister at bedside. Denies any pain now. Sister reports patient had dinner, then went to bed , then screamed in pain, stood up and passed out for 2-3 mins. After she woke up, she complained of pain everywhere    ICU Vital Signs Last 24 Hrs  T(C): 37.1 (28 Mar 2024 11:30), Max: 37.1 (28 Mar 2024 11:30)  T(F): 98.8 (28 Mar 2024 11:30), Max: 98.8 (28 Mar 2024 11:30)  HR: 61 (28 Mar 2024 11:30) (59 - 73)  BP: 112/60 (28 Mar 2024 11:30) (112/60 - 134/62)  BP(mean): 78 (28 Mar 2024 02:19) (78 - 78)  ABP: --  ABP(mean): --  RR: 19 (28 Mar 2024 11:30) (17 - 20)  SpO2: 95% (28 Mar 2024 11:30) (94% - 97%)    O2 Parameters below as of 28 Mar 2024 11:30  Patient On (Oxygen Delivery Method): room air      P/E:  NAD  AAOx3, no focal deficit   CTABL  S1S2 WNL  Abd soft, nontender, BS present   BLLE no edema or calf tenderness     Labs noted     A/P:  Syncope   UTI  HTN  HLD    Plan:   Syncope vasovagal from pain?, orthostatic neg but after IVF  cont tele   TTE  Cardio consult  Cont Ceftriaxone   Follow urine culture  Cont statin  Lovenox for DVT ppx

## 2024-03-28 NOTE — DISCHARGE NOTE PROVIDER - NSDCCPCAREPLAN_GEN_ALL_CORE_FT
PRINCIPAL DISCHARGE DIAGNOSIS  Diagnosis: Syncope  Assessment and Plan of Treatment: You came to the hospital after an episode of syncope and collapse. You were admitted to exclude any neurological or cardiological causes. EKG showed normal rhyhm with no signs of ischemia. Your heart rhtym was monitored on telemetry, which did not show any irregular rhythms. You were seen by a cardiologist. Echocardiogram, an ultrasound of your heart, showed XXX. As your had a syncope event after having severe abdominal cramping, the syncope event was likely a vasovagal reaction to your pain. PLEASE STOP TAKING TIZANIDINE AND INFORM YOUR [facility] DOCTOR ABOUT THIS REACTION TO THE DRUG.   You are recommended to follow up with your primary care provider upon discharge for futher recommendations.      SECONDARY DISCHARGE DIAGNOSES  Diagnosis: Acute UTI  Assessment and Plan of Treatment: You were found to have a urinary tract infection on your admission. You were treated with 3 days of IV antibotics. Urinary tract infections can cause weakness, confusion, or spread to other organ systems in the body.    Diagnosis: Abdominal cramps  Assessment and Plan of Treatment: You reported having severe abdominal cramps before coming to the hospital. CT of your abdomen did not show any acute abnormalities that could have caused the pain. The abdominal pain was likely due to the new medication Tizanidine. PLEASE STOP TAKING TIZANIDINE AND INFORM YOUR [facility] DOCTOR ABOUT THIS REACTION TO THE DRUG.     PRINCIPAL DISCHARGE DIAGNOSIS  Diagnosis: Syncope  Assessment and Plan of Treatment: You came to the hospital after an episode of syncope and collapse. You were admitted to exclude any neurological or cardiological causes. EKG showed normal rhyhm with no signs of ischemia. Your heart rhtym was monitored on telemetry, which did not show any irregular rhythms. You were seen by a cardiologist. Echocardiogram, an ultrasound of your heart, was unremarkable. As your had a syncope event after having severe abdominal cramping, the syncope event was likely a vasovagal reaction to your pain. PLEASE STOP TAKING TIZANIDINE AND INFORM YOUR [facility] DOCTOR ABOUT THIS REACTION TO THE DRUG.   You are recommended to follow up with your primary care provider upon discharge for futher recommendations.      SECONDARY DISCHARGE DIAGNOSES  Diagnosis: Acute UTI  Assessment and Plan of Treatment: You were found to have a urinary tract infection on your admission. You were treated with 3 days of IV antibotics. Urinary tract infections can cause weakness, confusion, or spread to other organ systems in the body.    Diagnosis: Abdominal cramps  Assessment and Plan of Treatment: You reported having severe abdominal cramps before coming to the hospital. CT of your abdomen did not show any acute abnormalities that could have caused the pain. The abdominal pain was likely due to the new medication Tizanidine. PLEASE STOP TAKING TIZANIDINE AND INFORM YOUR [facility] DOCTOR ABOUT THIS REACTION TO THE DRUG.    Diagnosis: HTN (hypertension)  Assessment and Plan of Treatment: You have previously been diagnosed with hypertension (high blood pressure). Please take your medications on time and as prescribed. Monitor your blood pressure at home, keep a log of your home readings and follow up with your Primary Care Physician. As per AHA/ACC guidelines, it is important to adhere to a DASH Diet of fresh fruits, vegetables, lean meats such as poultry and fish, and whole wheat carbs. 30 minutes of aerobic exercise per day 3-4 times a week, reducing salt intake <1.5g/day, and cutting down on highly processed foods are also shown to reduce high blood pressure. Uncontrolled BP may result in organ damage to your eyes, brain, heart, and kidneys by causing strokes, heart attacks, kidney failure, and bleeds in your eye. Please follow up with your primary care physician within 1-2 weeks after discharge and routinely after.    Diagnosis: HLD (hyperlipidemia)  Assessment and Plan of Treatment: You have previously been diagnosed with high cholesterol. We continued your home statin during your hospitalization. Please continue to take your medications as prescribed and follow up with your primary doctor within 1 week of discharge and routinely after for further management.

## 2024-03-28 NOTE — PATIENT PROFILE ADULT - NSPROMEDSBROUGHTTOHOSP_GEN_A_NUR
[facility]   MD ROSITA APODACA MD ANGELA WONG, PA    TEL:  OFFICE: [phone]  DR SCOTT CELL: [phone]  DR. JOHNSON CELL: [phone]  KESHA HOUSTON CELL: [phone]        Patient is a 77y old  Female who presents with a chief complaint of shortness of breath (29 Apr 2019 10:21)      Patient seen and examined at bedside. No chest pain/sob    VITALS:  T(F): 97.8 (04-29-19 @ 11:40), Max: 98 (04-28-19 @ 14:26)  HR: 67 (04-29-19 @ 11:40)  BP: 111/74 (04-29-19 @ 11:40)  RR: 16 (04-29-19 @ 11:40)  SpO2: 100% (04-29-19 @ 05:36)  Wt(kg): --    04-28 @ 07:01  -  04-29 @ 07:00  --------------------------------------------------------  IN: 0 mL / OUT: 1 mL / NET: -1 mL    04-29 @ 07:01  -  04-29 @ 13:10  --------------------------------------------------------  IN: 2000 mL / OUT: 0 mL / NET: 2000 mL      Height (cm): 152.4 (04-28 @ 21:10)  Weight (kg): 60.5 (04-29 @ 00:21)  BMI (kg/m2): 26 (04-29 @ 00:21)  BSA (m2): 1.57 (04-29 @ 00:21)    PHYSICAL EXAM:  Constitutional: NAD  Neck: No JVD  Respiratory: CTAB, no wheezes, rales or rhonchi  Cardiovascular: S1, S2, RRR  Gastrointestinal: BS+, soft, NT/ND  Extremities: No peripheral edema    Hospital Medications:   MEDICATIONS  (STANDING):  allopurinol 300 milliGRAM(s) Oral daily  aspirin enteric coated 81 milliGRAM(s) Oral daily  atorvastatin 40 milliGRAM(s) Oral at bedtime  chlorhexidine 4% Liquid 1 Application(s) Topical two times a day  clopidogrel Tablet 75 milliGRAM(s) Oral daily  dextrose 5%. 1000 milliLiter(s) (50 mL/Hr) IV Continuous <Continuous>  dextrose 50% Injectable 12.5 Gram(s) IV Push once  dextrose 50% Injectable 25 Gram(s) IV Push once  dextrose 50% Injectable 25 Gram(s) IV Push once  docusate sodium 100 milliGRAM(s) Oral three times a day  hydrALAZINE 10 milliGRAM(s) Oral every 12 hours  insulin lispro (HumaLOG) corrective regimen sliding scale   SubCutaneous three times a day before meals  labetalol 100 milliGRAM(s) Oral daily  levothyroxine 50 MICROGram(s) Oral daily  multivitamin 1 Tablet(s) Oral daily  sevelamer carbonate 1600 milliGRAM(s) Oral three times a day with meals      LABS:  04-29    125<L>  |  82<L>  |  76<H>  ----------------------------<  176<H>  5.5<H>   |  18<L>  |  9.48<H>    Ca    10.5      29 Apr 2019 06:25  Phos  7.4     04-28  Mg     1.7     04-28    TPro  6.5  /  Alb  3.5  /  TBili  0.3  /  DBili      /  AST  27  /  ALT  25  /  AlkPhos  71  04-29    Creatinine Trend: 9.48 <--, 9.04 <--, 8.65 <--, 8.72 <--, 9.06 <--, 10.13 <--, 10.69 <--, 11.48 <--, 11.39 <--    Albumin, Serum: 3.5 g/dL (04-29 @ 06:25)  Phosphorus Level, Serum: 7.4 mg/dL (04-28 @ 18:15)                              9.9    8.12  )-----------( 188      ( 29 Apr 2019 06:25 )             31.5     Urine Studies:      .8 (Ca --)      [04-24-19 @ 05:17]   --  .4 (Ca --)      [01-13-19 @ 05:32]   --  .8 (Ca --)      [08-12-18 @ 06:00]   --  Vitamin D (25OH) 24.6      [04-24-19 @ 05:17]  HbA1c 9.8      [04-24-19 @ 05:17]  TSH 8.74      [04-24-19 @ 05:17]  Lipid: chol 167, , HDL 45, LDL 99      [04-24-19 @ 05:17]    HBsAb 23.8      [04-25-19 @ 14:53]  HBsAg NEGATIVE      [04-25-19 @ 14:53]  HBcAb Nonreactive      [04-25-19 @ 14:53]  HCV 0.04, Nonreactive Hepatitis C AB  S/CO Ratio                        Interpretation  < 1.00                                   Non-Reactive  1.00 - 4.99                         Weakly-Reactive  > 5.00                                Reactive  Non-Reactive: A person with a non-reactive HCV antibody  result is considered uninfected.  No further action is  needed unless recent infection is suspected.  In these  cases, consider repeat testing later to detect  seroconversion..  Weakly-Reactive: HCV antibody test is abnormal, HCV RNA  Qualitative test will follow.  Reactive: HCV antibody test is abnormal, HCV RNA  Qualitative test will follow.  Note: HCV antibody testing is performed on the [employer] system.      [04-25-19 @ 14:53]      RADIOLOGY & ADDITIONAL STUDIES: no

## 2024-03-28 NOTE — DISCHARGE NOTE PROVIDER - ATTENDING DISCHARGE PHYSICAL EXAMINATION:
Patient was seen and examined at bedside  Denies any new complains     ICU Vital Signs Last 24 Hrs  T(C): 37 (29 Mar 2024 11:07), Max: 37.2 (28 Mar 2024 20:06)  T(F): 98.6 (29 Mar 2024 11:07), Max: 99 (28 Mar 2024 20:06)  HR: 63 (29 Mar 2024 11:07) (63 - 73)  BP: 131/66 (29 Mar 2024 11:07) (107/58 - 134/65)  BP(mean): --  ABP: --  ABP(mean): --  RR: 18 (29 Mar 2024 11:07) (18 - 18)  SpO2: 95% (29 Mar 2024 11:07) (94% - 96%)    O2 Parameters below as of 29 Mar 2024 11:07  Patient On (Oxygen Delivery Method): room air    P/E:  NAD  AAOx3, no focal deficit   CTABL  S1S2 WNL  Abd soft, non tender, BS present   BLLE no edema     Labs noted     A/P:  Completed Ceftriaxone. TTE noted. D-dimer possibly elevated from acute phase reactant. No clinical s/s of PE. No right heart strain. Stable to be discharged home.

## 2024-03-28 NOTE — DISCHARGE NOTE PROVIDER - HOSPITAL COURSE
Patient is an 88 year old female from home, with PMH of HTN, HLD, GERD, Chronic back pain and prior PE (No longer on AC - 2018) who presented to the ED after passing out. Per family, patient took a new medication Tizanidine, following which the patient had severe abdominal cramps and had a syncopal event.  In ED patient hemodynamically stable and afebrile.  Patients labs reveal UTI with Pyuria and Trace LE.  Patients CTA Chest / Abdomen / Pelvis negative for acute pathology.  Patient is being admitted to tele for Syncope and UTI. Patient treated with IV ceftriaxone. Cardio was consulted. Echo showed XXXX.     Patient is able to ambulate and tolerate diet prior to discharge. Patient is stable for discharge per attending and is advised to follow up with PCP as outpatient.   Please refer to patient's complete medical chart with documents for a full hospital course, for this is only a brief summary.   Patient is an 88 year old female from home, with PMH of HTN, HLD, GERD, Chronic back pain and prior PE (No longer on AC - 2018) who presented to the ED after passing out. Per family, patient took a new medication Tizanidine, following which the patient had severe abdominal cramps and had a syncopal event.  In ED patient hemodynamically stable and afebrile.  Patients labs reveal UTI with Pyuria and Trace LE.  Patients CTA Chest / Abdomen / Pelvis negative for acute pathology.  Patient is being admitted to tele for Syncope and UTI. Patient treated with IV ceftriaxone. Cardio was consulted. Echo was wnl.    Patient is able to ambulate and tolerate diet prior to discharge. Patient is stable for discharge per attending and is advised to follow up with PCP as outpatient.   Please refer to patient's complete medical chart with documents for a full hospital course, for this is only a brief summary.

## 2024-03-29 ENCOUNTER — TRANSCRIPTION ENCOUNTER (OUTPATIENT)
Age: 89
End: 2024-03-29

## 2024-03-29 ENCOUNTER — RESULT REVIEW (OUTPATIENT)
Age: 89
End: 2024-03-29

## 2024-03-29 VITALS
TEMPERATURE: 99 F | DIASTOLIC BLOOD PRESSURE: 66 MMHG | RESPIRATION RATE: 18 BRPM | SYSTOLIC BLOOD PRESSURE: 131 MMHG | OXYGEN SATURATION: 95 % | HEART RATE: 63 BPM

## 2024-03-29 LAB
ANION GAP SERPL CALC-SCNC: 2 MMOL/L — LOW (ref 5–17)
BUN SERPL-MCNC: 13 MG/DL — SIGNIFICANT CHANGE UP (ref 7–18)
CALCIUM SERPL-MCNC: 8.6 MG/DL — SIGNIFICANT CHANGE UP (ref 8.4–10.5)
CHLORIDE SERPL-SCNC: 112 MMOL/L — HIGH (ref 96–108)
CO2 SERPL-SCNC: 27 MMOL/L — SIGNIFICANT CHANGE UP (ref 22–31)
CREAT SERPL-MCNC: 0.71 MG/DL — SIGNIFICANT CHANGE UP (ref 0.5–1.3)
CULTURE RESULTS: NO GROWTH — SIGNIFICANT CHANGE UP
D DIMER BLD IA.RAPID-MCNC: 749 NG/ML DDU — HIGH
EGFR: 82 ML/MIN/1.73M2 — SIGNIFICANT CHANGE UP
GLUCOSE SERPL-MCNC: 108 MG/DL — HIGH (ref 70–99)
HCT VFR BLD CALC: 32.2 % — LOW (ref 34.5–45)
HGB BLD-MCNC: 10.8 G/DL — LOW (ref 11.5–15.5)
MCHC RBC-ENTMCNC: 29.2 PG — SIGNIFICANT CHANGE UP (ref 27–34)
MCHC RBC-ENTMCNC: 33.5 GM/DL — SIGNIFICANT CHANGE UP (ref 32–36)
MCV RBC AUTO: 87 FL — SIGNIFICANT CHANGE UP (ref 80–100)
NRBC # BLD: 0 /100 WBCS — SIGNIFICANT CHANGE UP (ref 0–0)
PLATELET # BLD AUTO: 159 K/UL — SIGNIFICANT CHANGE UP (ref 150–400)
POTASSIUM SERPL-MCNC: 3.7 MMOL/L — SIGNIFICANT CHANGE UP (ref 3.5–5.3)
POTASSIUM SERPL-SCNC: 3.7 MMOL/L — SIGNIFICANT CHANGE UP (ref 3.5–5.3)
RBC # BLD: 3.7 M/UL — LOW (ref 3.8–5.2)
RBC # FLD: 13.7 % — SIGNIFICANT CHANGE UP (ref 10.3–14.5)
SODIUM SERPL-SCNC: 141 MMOL/L — SIGNIFICANT CHANGE UP (ref 135–145)
SPECIMEN SOURCE: SIGNIFICANT CHANGE UP
WBC # BLD: 4.97 K/UL — SIGNIFICANT CHANGE UP (ref 3.8–10.5)
WBC # FLD AUTO: 4.97 K/UL — SIGNIFICANT CHANGE UP (ref 3.8–10.5)

## 2024-03-29 PROCEDURE — 93306 TTE W/DOPPLER COMPLETE: CPT

## 2024-03-29 PROCEDURE — 80053 COMPREHEN METABOLIC PANEL: CPT

## 2024-03-29 PROCEDURE — 71275 CT ANGIOGRAPHY CHEST: CPT | Mod: MC

## 2024-03-29 PROCEDURE — 85610 PROTHROMBIN TIME: CPT

## 2024-03-29 PROCEDURE — 83880 ASSAY OF NATRIURETIC PEPTIDE: CPT

## 2024-03-29 PROCEDURE — 82962 GLUCOSE BLOOD TEST: CPT

## 2024-03-29 PROCEDURE — 86703 HIV-1/HIV-2 1 RESULT ANTBDY: CPT

## 2024-03-29 PROCEDURE — 99285 EMERGENCY DEPT VISIT HI MDM: CPT

## 2024-03-29 PROCEDURE — 96374 THER/PROPH/DIAG INJ IV PUSH: CPT

## 2024-03-29 PROCEDURE — 81001 URINALYSIS AUTO W/SCOPE: CPT

## 2024-03-29 PROCEDURE — 85027 COMPLETE CBC AUTOMATED: CPT

## 2024-03-29 PROCEDURE — 85379 FIBRIN DEGRADATION QUANT: CPT

## 2024-03-29 PROCEDURE — 36415 COLL VENOUS BLD VENIPUNCTURE: CPT

## 2024-03-29 PROCEDURE — 71045 X-RAY EXAM CHEST 1 VIEW: CPT

## 2024-03-29 PROCEDURE — 80048 BASIC METABOLIC PNL TOTAL CA: CPT

## 2024-03-29 PROCEDURE — 83690 ASSAY OF LIPASE: CPT

## 2024-03-29 PROCEDURE — 84484 ASSAY OF TROPONIN QUANT: CPT

## 2024-03-29 PROCEDURE — 84100 ASSAY OF PHOSPHORUS: CPT

## 2024-03-29 PROCEDURE — 93005 ELECTROCARDIOGRAM TRACING: CPT

## 2024-03-29 PROCEDURE — 83735 ASSAY OF MAGNESIUM: CPT

## 2024-03-29 PROCEDURE — 87086 URINE CULTURE/COLONY COUNT: CPT

## 2024-03-29 PROCEDURE — 87040 BLOOD CULTURE FOR BACTERIA: CPT

## 2024-03-29 PROCEDURE — T1013: CPT

## 2024-03-29 PROCEDURE — 99239 HOSP IP/OBS DSCHRG MGMT >30: CPT | Mod: GC

## 2024-03-29 PROCEDURE — 85025 COMPLETE CBC W/AUTO DIFF WBC: CPT

## 2024-03-29 PROCEDURE — 74174 CTA ABD&PLVS W/CONTRAST: CPT | Mod: MC

## 2024-03-29 RX ADMIN — CEFTRIAXONE 100 MILLIGRAM(S): 500 INJECTION, POWDER, FOR SOLUTION INTRAMUSCULAR; INTRAVENOUS at 13:21

## 2024-03-29 RX ADMIN — LOSARTAN POTASSIUM 50 MILLIGRAM(S): 100 TABLET, FILM COATED ORAL at 05:27

## 2024-03-29 RX ADMIN — ENOXAPARIN SODIUM 40 MILLIGRAM(S): 100 INJECTION SUBCUTANEOUS at 05:27

## 2024-03-29 NOTE — DISCHARGE NOTE NURSING/CASE MANAGEMENT/SOCIAL WORK - PATIENT PORTAL LINK FT
You can access the FollowMyHealth Patient Portal offered by [facility] by registering at the following website: [email or web address]. By joining [facility]’s FollowMyHealth portal, you will also be able to view your health information using other applications (apps) compatible with our system.

## 2024-03-29 NOTE — PROGRESS NOTE ADULT - PROBLEM SELECTOR PLAN 4
- Patient with history of HLD  - Patient on Rosuvastatin at home  c/w statin 40mg qd
- Patient with history of HLD  - Patient on Rosuvastatin at home  c/w statin 40mg qd

## 2024-03-29 NOTE — PROGRESS NOTE ADULT - PROBLEM SELECTOR PLAN 5
- Lovenox for DVT PPX (Completed course of Full dose AC for Prior PE and now off Full AC)
- Lovenox for DVT PPX (Completed course of Full dose AC for Prior PE and now off Full AC)

## 2024-03-29 NOTE — CONSULT NOTE ADULT - SUBJECTIVE AND OBJECTIVE BOX
Cardiology Consult Note   [Please check [email or web address] password: [email or web address] for cardiology service schedule and contact information]    History of Present Illness:       PAST MEDICAL & SURGICAL HISTORY:  HTN (hypertension)      GERD (gastroesophageal reflux disease)      Pulmonary emboli      Gallstones      S/P appendectomy      History of ERCP        FAMILY HISTORY:  Family history of diabetes mellitus  Mother       SOCIAL HISTORY:  unchanged    MEDICATIONS:  enoxaparin Injectable 40 milliGRAM(s) SubCutaneous every 24 hours  losartan 50 milliGRAM(s) Oral daily    cefTRIAXone   IVPB 1000 milliGRAM(s) IV Intermittent every 24 hours      acetaminophen     Tablet .. 650 milliGRAM(s) Oral every 6 hours PRN      atorvastatin 40 milliGRAM(s) Oral at bedtime        REVIEW OF SYSTEMS:  CV: chest pain (-), palpitation (-), orthopnea (-), PND (-), edema (-)  PULM: SOB (-), cough (-), wheezing (-), hemoptysis (-).   CONST: fever (-), chills (-) or fatigue (-)  GI: abdominal distension (-), abdominal pain (-) , nausea/vomiting (-), hematemesis, (-), melena (-), hematochezia (-)  : dysuria (-), frequency (-), hematuria (-).   NEURO: numbness (-), weakness (-), dizziness (-)  SKIN: itching (-), rash (-)  HEENT:  visual changes (-); vertigo or throat pain (-);  neck stiffness (-)     All other review of systems is negative unless indicated above.   -------------------------------------------------------------------------------------------  PHYSICAL EXAM:  T(C): 37 (24 @ 11:07), Max: 37.2 (24 @ 15:46)  HR: 63 (24 @ 11:07) (63 - 73)  BP: 131/66 (24 @ 11:07) (107/58 - 134/65)  RR: 18 (24 @ 11:07) (18 - 18)  SpO2: 95% (24 @ 11:07) (92% - 96%)  Wt(kg): --  I&O's Summary      General: No acute distress. Awake and conversant.   Eyes: Normal conjunctiva, anicteric. Round symmetric pupils.   ENT: Hearing grossly intact. No nasal discharge.   Neck: Neck is supple. No masses or thyromegaly.   Respiratory: Respirations are non-labored. No wheezing.   Skin: Warm. No rashes or ulcers.   Psych: Alert and oriented. Cooperative, Appropriate mood and affect, Normal judgment.   CV: No lower extremity edema.   MSK: Normal ambulation. No clubbing or cyanosis.   Neuro: Sensation and CN II-XII grossly normal.      -------------------------------------------------------------------------------------------  LABS:                          10.8   4.97  )-----------( 159      ( 29 Mar 2024 05:41 )             32.2         141  |  112<H>  |  13  ----------------------------<  108<H>  3.7   |  27  |  0.71    Ca    8.6      29 Mar 2024 05:41  Phos  2.9     -  Mg     1.9     -    TPro  6.0  /  Alb  3.1<L>  /  TBili  0.5  /  DBili  x   /  AST  17  /  ALT  16  /  AlkPhos  69      PT/INR - ( 27 Mar 2024 16:10 )   PT: 12.7 sec;   INR: 1.12 ratio               cefTRIAXone   IVPB 1000 milliGRAM(s) IV Intermittent every 24 hours      acetaminophen     Tablet .. 650 milliGRAM(s) Oral every 6 hours PRN    -------------------------------------------------------------------------------------------  Meds:    -------------------------------------------------------------------------------------------  Cardiovascular Diagnostic Testing:    ECG:     Echo:     Stress Testing:    Cath:    Imaging:    CXR:  reviewed  -------------------------------------------------------------------------------------------  Assessment and Plan:   Syncope- unclear if true LOC. Likely dizziness related to orthostasis now hemodynamically stable.   -f/u echocardiogram  -check D-dimer   HTN: stable  continue losartan  HLD:   continue statin       Eliecer Tadeo MD   of Cardiology  [facility]  [address], [address]  [place] [address]  Phone: [phone]  Fax: [phone]    Please check [email or web address] password: [email or web address]" for cardiology service schedule and contact information.

## 2024-03-29 NOTE — PROGRESS NOTE ADULT - ASSESSMENT
Patient is an 88 year old female from home, with PMH of HTN, HLD, GERD, Chronic back pain and prior PE (No longer on AC) who presented to the ED after passing out.  In ED patient hemodynamically stable and afebrile.  Patients labs reveal UTI with Pyuria and Trace LE.  Patients CTA Chest / Abdomen / Pelvis negative for acute pathology.  Patient is being admitted to tele for Syncope and UTI.
Patient is an 88 year old female from home, with PMH of HTN, HLD, GERD, Chronic back pain and prior PE (No longer on AC) who presented to the ED after passing out.  In ED patient hemodynamically stable and afebrile.  Patients labs reveal UTI with Pyuria and Trace LE.  Patients CTA Chest / Abdomen / Pelvis negative for acute pathology.  Patient is being admitted to tele for Syncope and UTI.

## 2024-03-29 NOTE — PROGRESS NOTE ADULT - SUBJECTIVE AND OBJECTIVE BOX
Chief complaint: Patient is a 88y old  Female who presents with a chief complaint of Syncope and Acute UTI (27 Mar 2024 21:26)      DENICE MELCHOR is a 88y year old Female     INTERVAL HPI/OVERNIGHT EVENTS: No acute events overnight.  Patient examined at bedside this AM.  Patient denies acute complaints. Assessed with  Anson ([id number]).       REVIEW OF SYSTEMS:  CONSTITUTIONAL: No fever, weight loss, or fatigue  EYES: No eye pain, visual disturbances, or discharge  ENMT:  No difficulty hearing, tinnitus, vertigo; No sinus or throat pain  NECK: No pain or stiffness  RESPIRATORY: No cough, wheezing, chills or hemoptysis; No shortness of breath  CARDIOVASCULAR: No chest pain, palpitations, dizziness, or leg swelling  GASTROINTESTINAL: No abdominal or epigastric pain. No nausea, vomiting, or hematemesis; No diarrhea or constipation.   GENITOURINARY: No dysuria, frequency, hematuria, or incontinence  NEUROLOGICAL: + headache; No memory loss, loss of strength, numbness, or tremors  MUSCULOSKELETAL: No joint pain or swelling; No muscle, back, or extremity pain  HEME/LYMPH: No easy bruising, or bleeding gums      FAMILY HISTORY:  Family history of diabetes mellitus  Mother         T(C): 37 (24 @ 08:06), Max: 37 (24 @ 19:33)  HR: 59 (24 @ 08:06) (59 - 73)  BP: 119/47 (24 @ 08:06) (114/59 - 134/62)  RR: 17 (24 @ 08:06) (17 - 20)  SpO2: 94% (24 @ 08:06) (94% - 97%)  Wt(kg): --Vital Signs Last 24 Hrs  T(C): 37 (28 Mar 2024 08:06), Max: 37 (27 Mar 2024 19:33)  T(F): 98.6 (28 Mar 2024 08:06), Max: 98.6 (27 Mar 2024 19:33)  HR: 59 (28 Mar 2024 08:06) (59 - 73)  BP: 119/47 (28 Mar 2024 08:06) (114/59 - 134/62)  BP(mean): 78 (28 Mar 2024 02:19) (78 - 78)  RR: 17 (28 Mar 2024 08:06) (17 - 20)  SpO2: 94% (28 Mar 2024 08:06) (94% - 97%)    Parameters below as of 28 Mar 2024 08:06  Patient On (Oxygen Delivery Method): room air        PHYSICAL EXAM:  GENERAL: NAD  HEAD:  Atraumatic, Normocephalic  EYES: EOMI, PERRLA, conjunctiva and sclera clear. Hard of hearing   ENMT: No tonsillar erythema, exudates, or enlargement; Moist mucous membranes.   NECK: Supple, No JVD  NERVOUS SYSTEM:  Alert & Oriented X2 (place and self), Good concentration; Motor Strength 5/5 B/L upper and lower extremities  CHEST/LUNG: Clear to percussion bilaterally; No resp distress; No rales, rhonchi, wheezing, or rubs  HEART: Regular rate and rhythm; Mild systolic murmur; No rubs, or gallops  ABDOMEN: Soft, Nontender, Nondistended; Bowel sounds present  : no dysuria, no gross hematuria,  EXTREMITIES: No clubbing, cyanosis, or edema  SKIN: No rashes or lesions          LABS:                        10.8   5.72  )-----------( 151      ( 27 Mar 2024 16:10 )             32.7           142  |  109<H>  |  22<H>  ----------------------------<  128<H>  3.5   |  28  |  0.73    Ca    8.3<L>      27 Mar 2024 16:10  Mg     1.9         TPro  6.0  /  Alb  3.1<L>  /  TBili  0.5  /  DBili  x   /  AST  17  /  ALT  16  /  AlkPhos  69          RADIOLOGY & ADDITIONAL TESTS:    Imaging Personally Reviewed:  [ ] YES  [ ] NO  acetaminophen     Tablet .. 650 milliGRAM(s) Oral every 6 hours PRN  atorvastatin 40 milliGRAM(s) Oral at bedtime  cefTRIAXone   IVPB 1000 milliGRAM(s) IV Intermittent every 24 hours  enoxaparin Injectable 40 milliGRAM(s) SubCutaneous every 24 hours  losartan 50 milliGRAM(s) Oral daily      HEALTH ISSUES - PROBLEM Dx:  Syncope    Acute UTI    HTN (hypertension)    HLD (hyperlipidemia)    Prophylactic measure          
Chief complaint: Patient is a 88y old  Female who presents with a chief complaint of Syncope and Acute UTI (29 Mar 2024 12:51)      DENICE MELCHOR is a 88y year old Female     INTERVAL HPI/OVERNIGHT EVENTS: No acute events overnight.  Patient examined at bedside this AM.  Patient denies acute complaints. Patient assessed with  [id number] (Alan)       REVIEW OF SYSTEMS:  CONSTITUTIONAL: No fever, weight loss, or fatigue  EYES: No eye pain, visual disturbances, or discharge  ENMT:  No difficulty hearing, tinnitus, vertigo; No sinus or throat pain  NECK: No pain or stiffness  RESPIRATORY: No cough, wheezing, chills or hemoptysis; No shortness of breath  CARDIOVASCULAR: No chest pain, palpitations, dizziness, or leg swelling  GASTROINTESTINAL: No abdominal or epigastric pain. No nausea, vomiting, or hematemesis; No diarrhea or constipation.   GENITOURINARY: No dysuria, frequency, hematuria, or incontinence  NEUROLOGICAL: No headaches, memory loss, loss of strength, numbness, or tremors  MUSCULOSKELETAL: No joint pain or swelling; No muscle, back, or extremity pain  HEME/LYMPH: No easy bruising, or bleeding gums      FAMILY HISTORY:  Family history of diabetes mellitus  Mother         T(C): 37 (24 @ 11:07), Max: 37.2 (24 @ 15:46)  HR: 63 (24 @ 11:07) (63 - 73)  BP: 131/66 (24 @ 11:07) (107/58 - 134/65)  RR: 18 (24 @ 11:07) (18 - 18)  SpO2: 95% (24 @ 11:07) (92% - 96%)  Wt(kg): --Vital Signs Last 24 Hrs  T(C): 37 (29 Mar 2024 11:07), Max: 37.2 (28 Mar 2024 15:46)  T(F): 98.6 (29 Mar 2024 11:07), Max: 99 (28 Mar 2024 15:46)  HR: 63 (29 Mar 2024 11:07) (63 - 73)  BP: 131/66 (29 Mar 2024 11:07) (107/58 - 134/65)  BP(mean): --  RR: 18 (29 Mar 2024 11:07) (18 - 18)  SpO2: 95% (29 Mar 2024 11:07) (92% - 96%)    Parameters below as of 29 Mar 2024 11:07  Patient On (Oxygen Delivery Method): room air        PHYSICAL EXAM:  GENERAL: NAD, well-groomed, well-developed  HEAD:  Atraumatic, Normocephalic  EYES: EOMI, PERRLA, conjunctiva and sclera clear  ENMT: No tonsillar erythema, exudates, or enlargement; Moist mucous membranes.   NECK: Supple, No JVD  NERVOUS SYSTEM:  Alert & Oriented X3, Good concentration; Motor Strength 5/5 B/L upper and lower extremities  CHEST/LUNG: Clear to percussion bilaterally; No resp distress; No rales, rhonchi, wheezing, or rubs  HEART: Regular rate and rhythm; No murmurs, rubs, or gallops  ABDOMEN: Soft, Nontender, Nondistended; Bowel sounds present  : no dysuria, no gross hematuria,  EXTREMITIES: No clubbing, cyanosis, or edema  SKIN: No rashes or lesions          LABS:                        10.8   4.97  )-----------( 159      ( 29 Mar 2024 05:41 )             32.2       03-29    141  |  112<H>  |  13  ----------------------------<  108<H>  3.7   |  27  |  0.71    Ca    8.6      29 Mar 2024 05:41  Phos  2.9     03-28  Mg     1.9     03-28    TPro  6.0  /  Alb  3.1<L>  /  TBili  0.5  /  DBili  x   /  AST  17  /  ALT  16  /  AlkPhos  69  03-27        RADIOLOGY & ADDITIONAL TESTS:    Imaging Personally Reviewed:  [ ] YES  [ ] NO  acetaminophen     Tablet .. 650 milliGRAM(s) Oral every 6 hours PRN  atorvastatin 40 milliGRAM(s) Oral at bedtime  cefTRIAXone   IVPB 1000 milliGRAM(s) IV Intermittent every 24 hours  enoxaparin Injectable 40 milliGRAM(s) SubCutaneous every 24 hours  losartan 50 milliGRAM(s) Oral daily      HEALTH ISSUES - PROBLEM Dx:  Syncope    HTN (hypertension)    HLD (hyperlipidemia)    Prophylactic measure    Acute UTI

## 2024-03-29 NOTE — PROGRESS NOTE ADULT - PROBLEM SELECTOR PLAN 3
- Patient with history of HTN  - Patient on Olmesartan at home  - c/w losartan
- Patient with history of HTN  - Patient on Olmesartan at home  - c/w losartan

## 2024-03-29 NOTE — PROGRESS NOTE ADULT - PROBLEM SELECTOR PLAN 2
- Presented with urinary frequency and abdominal pain  - UA Positive with WBC 5.72   - IV Fluids  - F/U Urine cultures   - IV Ceftriaxone qd for 3 days for uncomplicated UTI

## 2024-03-29 NOTE — PROGRESS NOTE ADULT - PROBLEM SELECTOR PLAN 1
- presented after episode of passing out  - hemodynamically stable and afebrile  - Admit to telemetry: no events on tele   - EKG showing NSR  - Trop negative  6.5  - orthostatic neg   - F/u echo  - Cardiology Dr. Tadeo
- presented after episode of passing out  - hemodynamically stable and afebrile  - Admit to telemetry: no events on tele   - EKG showing NSR  - Trop negative  6.5  - orthostatic neg   - F/u echo  - Cardiology Dr. Tadeo

## 2024-03-30 ENCOUNTER — TRANSCRIPTION ENCOUNTER (OUTPATIENT)
Age: 89
End: 2024-03-30

## 2024-04-02 ENCOUNTER — TRANSCRIPTION ENCOUNTER (OUTPATIENT)
Age: 89
End: 2024-04-02

## 2024-10-11 NOTE — ED ADULT NURSE NOTE - EXTENSIONS OF SELF_ADULT
Name: Avtar Chapman    Relationship: Self    Best Callback Number: [phone]    HUB PROVIDED THE RELAY MESSAGE FROM THE OFFICE   PATIENT VOICED UNDERSTANDING AND HAS NO FURTHER QUESTIONS AT THIS TIME       None/Hearing Aid

## 2025-06-16 NOTE — ED ADULT NURSE NOTE - CAS TRG GEN SKIN CONDITION
Medicare Preventive Visit Patient Instructions  Thank you for completing your Welcome to Medicare Visit or Medicare Annual Wellness Visit today. Your next wellness visit will be due in one year (6/17/2026).  The screening/preventive services that you may require over the next 5-10 years are detailed below. Some tests may not apply to you based off risk factors and/or age. Screening tests ordered at today's visit but not completed yet may show as past due. Also, please note that scanned in results may not display below.  Preventive Screenings:  Service Recommendations Previous Testing/Comments   Colorectal Cancer Screening  Colonoscopy    Fecal Occult Blood Test (FOBT)/Fecal Immunochemical Test (FIT)  Fecal DNA/Cologuard Test  Flexible Sigmoidoscopy Age: 45-75 years old   Colonoscopy: every 10 years (May be performed more frequently if at higher risk)  OR  FOBT/FIT: every 1 year  OR  Cologuard: every 3 years  OR  Sigmoidoscopy: every 5 years  Screening may be recommended earlier than age 45 if at higher risk for colorectal cancer. Also, an individualized decision between you and your healthcare provider will decide whether screening between the ages of 76-85 would be appropriate. Colonoscopy: 03/04/2024  FOBT/FIT: Not on file  Cologuard: Not on file  Sigmoidoscopy: Not on file    Screening Current     Prostate Cancer Screening Individualized decision between patient and health care provider in men between ages of 55-69   Medicare will cover every 12 months beginning on the day after your 50th birthday PSA: 2.68 ng/mL     Screening Not Indicated     Hepatitis C Screening Once for adults born between 1945 and 1965  More frequently in patients at high risk for Hepatitis C Hep C Antibody: 06/29/2020    Screening Current   Diabetes Screening 1-2 times per year if you're at risk for diabetes or have pre-diabetes Fasting glucose: 101 mg/dL (12/16/2024)  A1C: 5.9 % (12/16/2024)  Screening Current   Cholesterol Screening Once  every 5 years if you don't have a lipid disorder. May order more often based on risk factors. Lipid panel: 12/16/2024  Screening Not Indicated  History Lipid Disorder      Other Preventive Screenings Covered by Medicare:  Abdominal Aortic Aneurysm (AAA) Screening: covered once if your at risk. You're considered to be at risk if you have a family history of AAA or a male between the age of 65-75 who smoking at least 100 cigarettes in your lifetime.  Lung Cancer Screening: covers low dose CT scan once per year if you meet all of the following conditions: (1) Age 55-77; (2) No signs or symptoms of lung cancer; (3) Current smoker or have quit smoking within the last 15 years; (4) You have a tobacco smoking history of at least 20 pack years (packs per day x number of years you smoked); (5) You get a written order from a healthcare provider.  Glaucoma Screening: covered annually if you're considered high risk: (1) You have diabetes OR (2) Family history of glaucoma OR (3)  aged 50 and older OR (4)  American aged 65 and older  Osteoporosis Screening: covered every 2 years if you meet one of the following conditions: (1) Have a vertebral abnormality; (2) On glucocorticoid therapy for more than 3 months; (3) Have primary hyperparathyroidism; (4) On osteoporosis medications and need to assess response to drug therapy.  HIV Screening: covered annually if you're between the age of 15-65. Also covered annually if you are younger than 15 and older than 65 with risk factors for HIV infection. For pregnant patients, it is covered up to 3 times per pregnancy.    Immunizations:  Immunization Recommendations   Influenza Vaccine Annual influenza vaccination during flu season is recommended for all persons aged >= 6 months who do not have contraindications   Pneumococcal Vaccine   * Pneumococcal conjugate vaccine = PCV13 (Prevnar 13), PCV15 (Vaxneuvance), PCV20 (Prevnar 20)  * Pneumococcal polysaccharide vaccine  = PPSV23 (Pneumovax) Adults 19-65 yo with certain risk factors or if 65+ yo  If never received any pneumonia vaccine: recommend Prevnar 20 (PCV20)  Give PCV20 if previously received 1 dose of PCV13 or PPSV23   Hepatitis B Vaccine 3 dose series if at intermediate or high risk (ex: diabetes, end stage renal disease, liver disease)   Respiratory syncytial virus (RSV) Vaccine - COVERED BY MEDICARE PART D  * RSVPreF3 (Arexvy) CDC recommends that adults 60 years of age and older may receive a single dose of RSV vaccine using shared clinical decision-making (SCDM)   Tetanus (Td) Vaccine - COST NOT COVERED BY MEDICARE PART B Following completion of primary series, a booster dose should be given every 10 years to maintain immunity against tetanus. Td may also be given as tetanus wound prophylaxis.   Tdap Vaccine - COST NOT COVERED BY MEDICARE PART B Recommended at least once for all adults. For pregnant patients, recommended with each pregnancy.   Shingles Vaccine (Shingrix) - COST NOT COVERED BY MEDICARE PART B  2 shot series recommended in those 19 years and older who have or will have weakened immune systems or those 50 years and older     Health Maintenance Due:      Topic Date Due   • Hepatitis C Screening  Completed   • Colorectal Cancer Screening  Discontinued     Immunizations Due:      Topic Date Due   • COVID-19 Vaccine (3 - 2024-25 season) 09/01/2024     Advance Directives   What are advance directives?  Advance directives are legal documents that state your wishes and plans for medical care. These plans are made ahead of time in case you lose your ability to make decisions for yourself. Advance directives can apply to any medical decision, such as the treatments you want, and if you want to donate organs.   What are the types of advance directives?  There are many types of advance directives, and each state has rules about how to use them. You may choose a combination of any of the following:  Living will:  This  is a written record of the treatment you want. You can also choose which treatments you do not want, which to limit, and which to stop at a certain time. This includes surgery, medicine, IV fluid, and tube feedings.   Durable power of  for healthcare (DPAHC):  This is a written record that states who you want to make healthcare choices for you when you are unable to make them for yourself. This person, called a proxy, is usually a family member or a friend. You may choose more than 1 proxy.  Do not resuscitate (DNR) order:  A DNR order is used in case your heart stops beating or you stop breathing. It is a request not to have certain forms of treatment, such as CPR. A DNR order may be included in other types of advance directives.  Medical directive:  This covers the care that you want if you are in a coma, near death, or unable to make decisions for yourself. You can list the treatments you want for each condition. Treatment may include pain medicine, surgery, blood transfusions, dialysis, IV or tube feedings, and a ventilator (breathing machine).  Values history:  This document has questions about your views, beliefs, and how you feel and think about life. This information can help others choose the care that you would choose.  Why are advance directives important?  An advance directive helps you control your care. Although spoken wishes may be used, it is better to have your wishes written down. Spoken wishes can be misunderstood, or not followed. Treatments may be given even if you do not want them. An advance directive may make it easier for your family to make difficult choices about your care.   Weight Management   Why it is important to manage your weight:  Being overweight increases your risk of health conditions such as heart disease, high blood pressure, type 2 diabetes, and certain types of cancer. It can also increase your risk for osteoarthritis, sleep apnea, and other respiratory problems. Aim  for a slow, steady weight loss. Even a small amount of weight loss can lower your risk of health problems.  How to lose weight safely:  A safe and healthy way to lose weight is to eat fewer calories and get regular exercise. You can lose up about 1 pound a week by decreasing the number of calories you eat by 500 calories each day.   Healthy meal plan for weight management:  A healthy meal plan includes a variety of foods, contains fewer calories, and helps you stay healthy. A healthy meal plan includes the following:  Eat whole-grain foods more often.  A healthy meal plan should contain fiber. Fiber is the part of grains, fruits, and vegetables that is not broken down by your body. Whole-grain foods are healthy and provide extra fiber in your diet. Some examples of whole-grain foods are whole-wheat breads and pastas, oatmeal, brown rice, and bulgur.  Eat a variety of vegetables every day.  Include dark, leafy greens such as spinach, kale, chandler greens, and mustard greens. Eat yellow and orange vegetables such as carrots, sweet potatoes, and winter squash.   Eat a variety of fruits every day.  Choose fresh or canned fruit (canned in its own juice or light syrup) instead of juice. Fruit juice has very little or no fiber.  Eat low-fat dairy foods.  Drink fat-free (skim) milk or 1% milk. Eat fat-free yogurt and low-fat cottage cheese. Try low-fat cheeses such as mozzarella and other reduced-fat cheeses.  Choose meat and other protein foods that are low in fat.  Choose beans or other legumes such as split peas or lentils. Choose fish, skinless poultry (chicken or turkey), or lean cuts of red meat (beef or pork). Before you cook meat or poultry, cut off any visible fat.   Use less fat and oil.  Try baking foods instead of frying them. Add less fat, such as margarine, sour cream, regular salad dressing and mayonnaise to foods. Eat fewer high-fat foods. Some examples of high-fat foods include french fries, doughnuts, ice  cream, and cakes.  Eat fewer sweets.  Limit foods and drinks that are high in sugar. This includes candy, cookies, regular soda, and sweetened drinks.  Exercise:  Exercise at least 30 minutes per day on most days of the week. Some examples of exercise include walking, biking, dancing, and swimming. You can also fit in more physical activity by taking the stairs instead of the elevator or parking farther away from stores. Ask your healthcare provider about the best exercise plan for you.    © Copyright [employer] 2018 Information is for End User's use only and may not be sold, redistributed or otherwise used for commercial purposes. All illustrations and images included in CareNotes® are the copyrighted property of [employer] or [employer]     Warm

## 2025-07-15 NOTE — ED ADULT TRIAGE NOTE - SPO2 (%)
Requested Medication: Ozempic     Pharmacy: [facility] in [place]     Last office visit: 7/1/25  Next follow up: 11/12/25    Was sent to the  Pharmacy for coverage purposes      98

## 2025-07-29 NOTE — ED ADULT NURSE NOTE - NS ED NURSE DC INFO COMPLEXITY
- PFA of PVI and PWI   - RFA of anterior mitral and CTI   - doing well   - no arrhythmias since on device interrogation   - continue Eliquis for stroke    - blood pressure stable   - needs to get active      
 - leads intact   - working appropriately   - 2 yrs on battery   
Simple: Patient demonstrates quick and easy understanding

## (undated) DEVICE — INJ SYS RAP REFIL

## (undated) DEVICE — TROCAR COVIDIEN VERSAONE BLADED SMOOTH 11MM STANDARD

## (undated) DEVICE — SUT MAXON 1 60" T-60

## (undated) DEVICE — SOL INJ NS 0.9% 1000ML

## (undated) DEVICE — DRSG BANDAID 0.75X3"

## (undated) DEVICE — DRAPE C ARM UNIVERSAL

## (undated) DEVICE — STAPLER SKIN VISI-STAT 35 WIDE

## (undated) DEVICE — SENSOR O2 FINGER ADULT 24/CA

## (undated) DEVICE — SYR LUER LOK 20CC

## (undated) DEVICE — BLADE SURGICAL #10 CARBON

## (undated) DEVICE — DRSG GAUZE 4X4"

## (undated) DEVICE — LUBE JELLY FOILPACK 36GM STERILE

## (undated) DEVICE — TUBING STRYKER PNEUMOSURE HEATED RTP

## (undated) DEVICE — SYR ASEPTO

## (undated) DEVICE — SYR LUER LOK 10CC

## (undated) DEVICE — TUBING MEDI-VAC W MAXIGRIP CONNECTORS 1/4"X6'

## (undated) DEVICE — ELCTR BOVIE BLADE 3/4" EXTENDED LENGTH 6"

## (undated) DEVICE — SOL IRR POUR H2O 500ML

## (undated) DEVICE — ELCTR GROUNDING PAD ADULT COVIDIEN

## (undated) DEVICE — ENDOCATCH 10MM SPECIMEN POUCH

## (undated) DEVICE — DRSG MASTISOL

## (undated) DEVICE — SUT POLYSORB 0 30" GU-46

## (undated) DEVICE — INSUFFLATION NDL COVIDIEN SURGINEEDLE VERESS 120MM

## (undated) DEVICE — TUBING STRYKEFLOW II SUCTION / IRRIGATOR

## (undated) DEVICE — DRSG MEDIPORE DRESS IT 5-7/8 X 11"

## (undated) DEVICE — PACK GENERAL LAPAROSCOPY

## (undated) DEVICE — TRAP SPECIMEN SPUTUM 40CC

## (undated) DEVICE — DRAIN RESERVOIR FOR JACKSON PRATT 100CC CARDINAL

## (undated) DEVICE — TROCAR COVIDIEN VERSAONE BLADED SMOOTH 5MM STANDARD

## (undated) DEVICE — SYR LUER LOK 50CC

## (undated) DEVICE — WRAP COMPRESSION CALF MED

## (undated) DEVICE — BASIN SET SINGLE

## (undated) DEVICE — BITE BLOCK SCOPE SAVER 20X27MM ADULT GREEN

## (undated) DEVICE — TUBING CANNULA SALTER LABS NASAL ADULT 7FT

## (undated) DEVICE — DRAPE LIGHT HANDLE COVER (BLUE)

## (undated) DEVICE — GLV 7 PROTEXIS (WHITE)

## (undated) DEVICE — SPHIN TL TRI-TOME 7FR MONO

## (undated) DEVICE — SUT POLYSORB 4-0 27" P-12 UNDYED

## (undated) DEVICE — BRUSH CYTO DL ERCP 8FR 200CM

## (undated) DEVICE — PRECISION CUT SCISSOR MICROLINE MINI ENDOCUT DISP

## (undated) DEVICE — SOL IRR POUR NS 0.9% 1500ML

## (undated) DEVICE — VENODYNE/SCD SLEEVE CALF MEDIUM

## (undated) DEVICE — ELCTR FOOT CONTROL L WIRE LAPAROSCOPIC

## (undated) DEVICE — LOK DVC RX AND BIOPSY

## (undated) DEVICE — D HELP - CLEARVIEW CLEARIFY SYSTEM

## (undated) DEVICE — TUBING TRUWAVE PRESSURE MALE/FEMALE 72"

## (undated) DEVICE — BLADE SURGICAL #15 CARBON

## (undated) DEVICE — DRSG STERISTRIPS 0.5 X 4"

## (undated) DEVICE — DRAIN JACKSON PRATT 10MM FLAT 3/4 NO TROCAR

## (undated) DEVICE — Device

## (undated) DEVICE — WARMING BLANKET UPPER ADULT

## (undated) DEVICE — GLV 7.5 PROTEXIS (WHITE)

## (undated) DEVICE — SUCTION YANKAUER NO CONTROL VENT

## (undated) DEVICE — FOR-ESU VALLEYLAB T7E14999DX: Type: DURABLE MEDICAL EQUIPMENT

## (undated) DEVICE — DRSG CURITY GAUZE SPONGE 4 X 4" 12-PLY

## (undated) DEVICE — NDL HYPO REGULAR BEVEL 25G X 1.5" (BLUE)